# Patient Record
Sex: FEMALE | Race: OTHER | HISPANIC OR LATINO | URBAN - METROPOLITAN AREA
[De-identification: names, ages, dates, MRNs, and addresses within clinical notes are randomized per-mention and may not be internally consistent; named-entity substitution may affect disease eponyms.]

---

## 2018-09-02 ENCOUNTER — INPATIENT (INPATIENT)
Facility: HOSPITAL | Age: 63
LOS: 2 days | Discharge: HOME | DRG: 247 | End: 2018-09-05
Attending: INTERNAL MEDICINE | Admitting: INTERNAL MEDICINE
Payer: MEDICARE

## 2018-09-02 VITALS
TEMPERATURE: 98 F | RESPIRATION RATE: 18 BRPM | DIASTOLIC BLOOD PRESSURE: 93 MMHG | OXYGEN SATURATION: 97 % | SYSTOLIC BLOOD PRESSURE: 125 MMHG | HEART RATE: 55 BPM

## 2018-09-02 DIAGNOSIS — R07.9 CHEST PAIN, UNSPECIFIED: ICD-10-CM

## 2018-09-02 DIAGNOSIS — Z98.891 HISTORY OF UTERINE SCAR FROM PREVIOUS SURGERY: Chronic | ICD-10-CM

## 2018-09-02 DIAGNOSIS — E11.9 TYPE 2 DIABETES MELLITUS WITHOUT COMPLICATIONS: ICD-10-CM

## 2018-09-02 DIAGNOSIS — E66.01 MORBID (SEVERE) OBESITY DUE TO EXCESS CALORIES: ICD-10-CM

## 2018-09-02 DIAGNOSIS — I10 ESSENTIAL (PRIMARY) HYPERTENSION: ICD-10-CM

## 2018-09-02 DIAGNOSIS — G47.33 OBSTRUCTIVE SLEEP APNEA (ADULT) (PEDIATRIC): ICD-10-CM

## 2018-09-02 DIAGNOSIS — K21.9 GASTRO-ESOPHAGEAL REFLUX DISEASE WITHOUT ESOPHAGITIS: ICD-10-CM

## 2018-09-02 LAB
ALBUMIN SERPL ELPH-MCNC: 3.7 G/DL — SIGNIFICANT CHANGE UP (ref 3.3–5)
ALP SERPL-CCNC: 104 U/L — SIGNIFICANT CHANGE UP (ref 40–120)
ALT FLD-CCNC: 28 U/L — SIGNIFICANT CHANGE UP (ref 10–45)
ANION GAP SERPL CALC-SCNC: 13 MMOL/L — SIGNIFICANT CHANGE UP (ref 5–17)
ANION GAP SERPL CALC-SCNC: 17 MMOL/L — SIGNIFICANT CHANGE UP (ref 5–17)
APTT BLD: 30.5 SEC — SIGNIFICANT CHANGE UP (ref 27.5–37.4)
AST SERPL-CCNC: 29 U/L — SIGNIFICANT CHANGE UP (ref 10–40)
BASOPHILS NFR BLD AUTO: 0.3 % — SIGNIFICANT CHANGE UP (ref 0–2)
BILIRUB SERPL-MCNC: 0.3 MG/DL — SIGNIFICANT CHANGE UP (ref 0.2–1.2)
BUN SERPL-MCNC: 17 MG/DL — SIGNIFICANT CHANGE UP (ref 7–23)
BUN SERPL-MCNC: 18 MG/DL — SIGNIFICANT CHANGE UP (ref 7–23)
CALCIUM SERPL-MCNC: 9.4 MG/DL — SIGNIFICANT CHANGE UP (ref 8.4–10.5)
CALCIUM SERPL-MCNC: 9.4 MG/DL — SIGNIFICANT CHANGE UP (ref 8.4–10.5)
CHLORIDE SERPL-SCNC: 101 MMOL/L — SIGNIFICANT CHANGE UP (ref 96–108)
CHLORIDE SERPL-SCNC: 99 MMOL/L — SIGNIFICANT CHANGE UP (ref 96–108)
CHOLEST SERPL-MCNC: 203 MG/DL — HIGH (ref 10–199)
CK MB CFR SERPL CALC: 2.9 NG/ML — SIGNIFICANT CHANGE UP (ref 0–6.7)
CK SERPL-CCNC: 86 U/L — SIGNIFICANT CHANGE UP (ref 25–170)
CO2 SERPL-SCNC: 24 MMOL/L — SIGNIFICANT CHANGE UP (ref 22–31)
CO2 SERPL-SCNC: 25 MMOL/L — SIGNIFICANT CHANGE UP (ref 22–31)
CREAT SERPL-MCNC: 0.84 MG/DL — SIGNIFICANT CHANGE UP (ref 0.5–1.3)
CREAT SERPL-MCNC: 0.94 MG/DL — SIGNIFICANT CHANGE UP (ref 0.5–1.3)
CRP SERPL-MCNC: 1.87 MG/DL — HIGH (ref 0–0.4)
EOSINOPHIL NFR BLD AUTO: 1.6 % — SIGNIFICANT CHANGE UP (ref 0–6)
ERYTHROCYTE [SEDIMENTATION RATE] IN BLOOD: 13 MM/HR — SIGNIFICANT CHANGE UP
GLUCOSE BLDC GLUCOMTR-MCNC: 146 MG/DL — HIGH (ref 70–99)
GLUCOSE BLDC GLUCOMTR-MCNC: 205 MG/DL — HIGH (ref 70–99)
GLUCOSE BLDC GLUCOMTR-MCNC: 238 MG/DL — HIGH (ref 70–99)
GLUCOSE BLDC GLUCOMTR-MCNC: 243 MG/DL — HIGH (ref 70–99)
GLUCOSE BLDC GLUCOMTR-MCNC: 296 MG/DL — HIGH (ref 70–99)
GLUCOSE SERPL-MCNC: 330 MG/DL — HIGH (ref 70–99)
GLUCOSE SERPL-MCNC: 347 MG/DL — HIGH (ref 70–99)
HBA1C BLD-MCNC: 11.1 % — HIGH (ref 4–5.6)
HCT VFR BLD CALC: 40.1 % — SIGNIFICANT CHANGE UP (ref 34.5–45)
HCT VFR BLD CALC: 40.2 % — SIGNIFICANT CHANGE UP (ref 34.5–45)
HDLC SERPL-MCNC: 36 MG/DL — LOW
HGB BLD-MCNC: 13.4 G/DL — SIGNIFICANT CHANGE UP (ref 11.5–15.5)
HGB BLD-MCNC: 13.6 G/DL — SIGNIFICANT CHANGE UP (ref 11.5–15.5)
INR BLD: 1.31 — HIGH (ref 0.88–1.16)
LIDOCAIN IGE QN: 28 U/L — SIGNIFICANT CHANGE UP (ref 7–60)
LIPID PNL WITH DIRECT LDL SERPL: 121 MG/DL — SIGNIFICANT CHANGE UP
LYMPHOCYTES # BLD AUTO: 16.1 % — SIGNIFICANT CHANGE UP (ref 13–44)
MAGNESIUM SERPL-MCNC: 1.6 MG/DL — SIGNIFICANT CHANGE UP (ref 1.6–2.6)
MCHC RBC-ENTMCNC: 30.1 PG — SIGNIFICANT CHANGE UP (ref 27–34)
MCHC RBC-ENTMCNC: 30.5 PG — SIGNIFICANT CHANGE UP (ref 27–34)
MCHC RBC-ENTMCNC: 33.4 G/DL — SIGNIFICANT CHANGE UP (ref 32–36)
MCHC RBC-ENTMCNC: 33.8 G/DL — SIGNIFICANT CHANGE UP (ref 32–36)
MCV RBC AUTO: 88.9 FL — SIGNIFICANT CHANGE UP (ref 80–100)
MCV RBC AUTO: 91.3 FL — SIGNIFICANT CHANGE UP (ref 80–100)
MONOCYTES NFR BLD AUTO: 5.3 % — SIGNIFICANT CHANGE UP (ref 2–14)
NEUTROPHILS NFR BLD AUTO: 76.7 % — SIGNIFICANT CHANGE UP (ref 43–77)
NT-PROBNP SERPL-SCNC: 100 PG/ML — SIGNIFICANT CHANGE UP (ref 0–300)
NT-PROBNP SERPL-SCNC: 135 PG/ML — SIGNIFICANT CHANGE UP (ref 0–300)
PLATELET # BLD AUTO: 195 K/UL — SIGNIFICANT CHANGE UP (ref 150–400)
PLATELET # BLD AUTO: 203 K/UL — SIGNIFICANT CHANGE UP (ref 150–400)
POTASSIUM SERPL-MCNC: 3.4 MMOL/L — LOW (ref 3.5–5.3)
POTASSIUM SERPL-MCNC: SIGNIFICANT CHANGE UP MMOL/L (ref 3.5–5.3)
POTASSIUM SERPL-SCNC: 3.4 MMOL/L — LOW (ref 3.5–5.3)
POTASSIUM SERPL-SCNC: SIGNIFICANT CHANGE UP MMOL/L (ref 3.5–5.3)
PROT SERPL-MCNC: 6.6 G/DL — SIGNIFICANT CHANGE UP (ref 6–8.3)
PROTHROM AB SERPL-ACNC: 14.6 SEC — HIGH (ref 9.8–12.7)
RBC # BLD: 4.39 M/UL — SIGNIFICANT CHANGE UP (ref 3.8–5.2)
RBC # BLD: 4.52 M/UL — SIGNIFICANT CHANGE UP (ref 3.8–5.2)
RBC # FLD: 13.3 % — SIGNIFICANT CHANGE UP (ref 10.3–16.9)
RBC # FLD: 13.3 % — SIGNIFICANT CHANGE UP (ref 10.3–16.9)
SODIUM SERPL-SCNC: 139 MMOL/L — SIGNIFICANT CHANGE UP (ref 135–145)
SODIUM SERPL-SCNC: 140 MMOL/L — SIGNIFICANT CHANGE UP (ref 135–145)
TOTAL CHOLESTEROL/HDL RATIO MEASUREMENT: 5.6 RATIO — SIGNIFICANT CHANGE UP (ref 3.3–7.1)
TRIGL SERPL-MCNC: 229 MG/DL — HIGH (ref 10–149)
TROPONIN T SERPL-MCNC: 0.1 NG/ML — CRITICAL HIGH (ref 0–0.01)
TROPONIN T SERPL-MCNC: 0.18 NG/ML — CRITICAL HIGH (ref 0–0.01)
TROPONIN T SERPL-MCNC: <0.01 NG/ML — SIGNIFICANT CHANGE UP (ref 0–0.01)
TSH SERPL-MCNC: 1.16 UIU/ML — SIGNIFICANT CHANGE UP (ref 0.35–4.94)
WBC # BLD: 6.6 K/UL — SIGNIFICANT CHANGE UP (ref 3.8–10.5)
WBC # BLD: 7.6 K/UL — SIGNIFICANT CHANGE UP (ref 3.8–10.5)
WBC # FLD AUTO: 6.6 K/UL — SIGNIFICANT CHANGE UP (ref 3.8–10.5)
WBC # FLD AUTO: 7.6 K/UL — SIGNIFICANT CHANGE UP (ref 3.8–10.5)

## 2018-09-02 PROCEDURE — 93010 ELECTROCARDIOGRAM REPORT: CPT

## 2018-09-02 PROCEDURE — 71045 X-RAY EXAM CHEST 1 VIEW: CPT | Mod: 26

## 2018-09-02 PROCEDURE — 99223 1ST HOSP IP/OBS HIGH 75: CPT

## 2018-09-02 PROCEDURE — 93010 ELECTROCARDIOGRAM REPORT: CPT | Mod: 76

## 2018-09-02 PROCEDURE — 99285 EMERGENCY DEPT VISIT HI MDM: CPT | Mod: 25

## 2018-09-02 RX ORDER — HEPARIN SODIUM 5000 [USP'U]/ML
INJECTION INTRAVENOUS; SUBCUTANEOUS
Qty: 25000 | Refills: 0 | Status: DISCONTINUED | OUTPATIENT
Start: 2018-09-02 | End: 2018-09-04

## 2018-09-02 RX ORDER — CLOPIDOGREL BISULFATE 75 MG/1
600 TABLET, FILM COATED ORAL ONCE
Qty: 0 | Refills: 0 | Status: COMPLETED | OUTPATIENT
Start: 2018-09-02 | End: 2018-09-02

## 2018-09-02 RX ORDER — MORPHINE SULFATE 50 MG/1
2 CAPSULE, EXTENDED RELEASE ORAL ONCE
Qty: 0 | Refills: 0 | Status: DISCONTINUED | OUTPATIENT
Start: 2018-09-02 | End: 2018-09-02

## 2018-09-02 RX ORDER — HEPARIN SODIUM 5000 [USP'U]/ML
6000 INJECTION INTRAVENOUS; SUBCUTANEOUS EVERY 6 HOURS
Qty: 0 | Refills: 0 | Status: DISCONTINUED | OUTPATIENT
Start: 2018-09-02 | End: 2018-09-04

## 2018-09-02 RX ORDER — LOSARTAN POTASSIUM 100 MG/1
25 TABLET, FILM COATED ORAL DAILY
Qty: 0 | Refills: 0 | Status: DISCONTINUED | OUTPATIENT
Start: 2018-09-02 | End: 2018-09-05

## 2018-09-02 RX ORDER — GLUCAGON INJECTION, SOLUTION 0.5 MG/.1ML
1 INJECTION, SOLUTION SUBCUTANEOUS ONCE
Qty: 0 | Refills: 0 | Status: DISCONTINUED | OUTPATIENT
Start: 2018-09-02 | End: 2018-09-05

## 2018-09-02 RX ORDER — DEXTROSE 50 % IN WATER 50 %
15 SYRINGE (ML) INTRAVENOUS ONCE
Qty: 0 | Refills: 0 | Status: DISCONTINUED | OUTPATIENT
Start: 2018-09-02 | End: 2018-09-05

## 2018-09-02 RX ORDER — PANTOPRAZOLE SODIUM 20 MG/1
40 TABLET, DELAYED RELEASE ORAL
Qty: 0 | Refills: 0 | Status: DISCONTINUED | OUTPATIENT
Start: 2018-09-02 | End: 2018-09-05

## 2018-09-02 RX ORDER — SODIUM CHLORIDE 9 MG/ML
1000 INJECTION, SOLUTION INTRAVENOUS
Qty: 0 | Refills: 0 | Status: DISCONTINUED | OUTPATIENT
Start: 2018-09-02 | End: 2018-09-05

## 2018-09-02 RX ORDER — HEPARIN SODIUM 5000 [USP'U]/ML
5000 INJECTION INTRAVENOUS; SUBCUTANEOUS ONCE
Qty: 0 | Refills: 0 | Status: COMPLETED | OUTPATIENT
Start: 2018-09-02 | End: 2018-09-02

## 2018-09-02 RX ORDER — ACETAMINOPHEN 500 MG
650 TABLET ORAL EVERY 6 HOURS
Qty: 0 | Refills: 0 | Status: DISCONTINUED | OUTPATIENT
Start: 2018-09-02 | End: 2018-09-05

## 2018-09-02 RX ORDER — INSULIN LISPRO 100/ML
VIAL (ML) SUBCUTANEOUS
Qty: 0 | Refills: 0 | Status: DISCONTINUED | OUTPATIENT
Start: 2018-09-02 | End: 2018-09-05

## 2018-09-02 RX ORDER — METOPROLOL TARTRATE 50 MG
12.5 TABLET ORAL
Qty: 0 | Refills: 0 | Status: DISCONTINUED | OUTPATIENT
Start: 2018-09-02 | End: 2018-09-02

## 2018-09-02 RX ORDER — POTASSIUM CHLORIDE 20 MEQ
40 PACKET (EA) ORAL ONCE
Qty: 0 | Refills: 0 | Status: COMPLETED | OUTPATIENT
Start: 2018-09-02 | End: 2018-09-02

## 2018-09-02 RX ORDER — ASPIRIN/CALCIUM CARB/MAGNESIUM 324 MG
162 TABLET ORAL ONCE
Qty: 0 | Refills: 0 | Status: COMPLETED | OUTPATIENT
Start: 2018-09-02 | End: 2018-09-02

## 2018-09-02 RX ORDER — MAGNESIUM SULFATE 500 MG/ML
2 VIAL (ML) INJECTION ONCE
Qty: 0 | Refills: 0 | Status: COMPLETED | OUTPATIENT
Start: 2018-09-02 | End: 2018-09-02

## 2018-09-02 RX ORDER — DEXTROSE 50 % IN WATER 50 %
25 SYRINGE (ML) INTRAVENOUS ONCE
Qty: 0 | Refills: 0 | Status: DISCONTINUED | OUTPATIENT
Start: 2018-09-02 | End: 2018-09-05

## 2018-09-02 RX ORDER — ASPIRIN/CALCIUM CARB/MAGNESIUM 324 MG
81 TABLET ORAL DAILY
Qty: 0 | Refills: 0 | Status: DISCONTINUED | OUTPATIENT
Start: 2018-09-03 | End: 2018-09-05

## 2018-09-02 RX ORDER — INFLUENZA VIRUS VACCINE 15; 15; 15; 15 UG/.5ML; UG/.5ML; UG/.5ML; UG/.5ML
0.5 SUSPENSION INTRAMUSCULAR ONCE
Qty: 0 | Refills: 0 | Status: COMPLETED | OUTPATIENT
Start: 2018-09-02 | End: 2018-09-02

## 2018-09-02 RX ORDER — ASPIRIN/CALCIUM CARB/MAGNESIUM 324 MG
81 TABLET ORAL DAILY
Qty: 0 | Refills: 0 | Status: DISCONTINUED | OUTPATIENT
Start: 2018-09-02 | End: 2018-09-02

## 2018-09-02 RX ORDER — ATORVASTATIN CALCIUM 80 MG/1
40 TABLET, FILM COATED ORAL AT BEDTIME
Qty: 0 | Refills: 0 | Status: DISCONTINUED | OUTPATIENT
Start: 2018-09-02 | End: 2018-09-04

## 2018-09-02 RX ORDER — DEXTROSE 50 % IN WATER 50 %
12.5 SYRINGE (ML) INTRAVENOUS ONCE
Qty: 0 | Refills: 0 | Status: DISCONTINUED | OUTPATIENT
Start: 2018-09-02 | End: 2018-09-05

## 2018-09-02 RX ORDER — ASPIRIN/CALCIUM CARB/MAGNESIUM 324 MG
325 TABLET ORAL ONCE
Qty: 0 | Refills: 0 | Status: DISCONTINUED | OUTPATIENT
Start: 2018-09-02 | End: 2018-09-02

## 2018-09-02 RX ORDER — HEPARIN SODIUM 5000 [USP'U]/ML
7500 INJECTION INTRAVENOUS; SUBCUTANEOUS EVERY 8 HOURS
Qty: 0 | Refills: 0 | Status: DISCONTINUED | OUTPATIENT
Start: 2018-09-02 | End: 2018-09-02

## 2018-09-02 RX ORDER — CLOPIDOGREL BISULFATE 75 MG/1
75 TABLET, FILM COATED ORAL DAILY
Qty: 0 | Refills: 0 | Status: DISCONTINUED | OUTPATIENT
Start: 2018-09-03 | End: 2018-09-05

## 2018-09-02 RX ADMIN — Medication 650 MILLIGRAM(S): at 06:10

## 2018-09-02 RX ADMIN — Medication 4: at 16:37

## 2018-09-02 RX ADMIN — Medication 81 MILLIGRAM(S): at 14:06

## 2018-09-02 RX ADMIN — Medication 650 MILLIGRAM(S): at 05:30

## 2018-09-02 RX ADMIN — Medication 162 MILLIGRAM(S): at 19:27

## 2018-09-02 RX ADMIN — MORPHINE SULFATE 2 MILLIGRAM(S): 50 CAPSULE, EXTENDED RELEASE ORAL at 03:00

## 2018-09-02 RX ADMIN — MORPHINE SULFATE 2 MILLIGRAM(S): 50 CAPSULE, EXTENDED RELEASE ORAL at 03:06

## 2018-09-02 RX ADMIN — HEPARIN SODIUM 5000 UNIT(S): 5000 INJECTION INTRAVENOUS; SUBCUTANEOUS at 18:09

## 2018-09-02 RX ADMIN — Medication 40 MILLIEQUIVALENT(S): at 07:46

## 2018-09-02 RX ADMIN — Medication 4: at 08:05

## 2018-09-02 RX ADMIN — Medication 6: at 11:21

## 2018-09-02 RX ADMIN — MORPHINE SULFATE 2 MILLIGRAM(S): 50 CAPSULE, EXTENDED RELEASE ORAL at 21:51

## 2018-09-02 RX ADMIN — PANTOPRAZOLE SODIUM 40 MILLIGRAM(S): 20 TABLET, DELAYED RELEASE ORAL at 07:47

## 2018-09-02 RX ADMIN — ATORVASTATIN CALCIUM 40 MILLIGRAM(S): 80 TABLET, FILM COATED ORAL at 21:37

## 2018-09-02 RX ADMIN — HEPARIN SODIUM 7500 UNIT(S): 5000 INJECTION INTRAVENOUS; SUBCUTANEOUS at 14:06

## 2018-09-02 RX ADMIN — MORPHINE SULFATE 2 MILLIGRAM(S): 50 CAPSULE, EXTENDED RELEASE ORAL at 03:43

## 2018-09-02 RX ADMIN — HEPARIN SODIUM 7500 UNIT(S): 5000 INJECTION INTRAVENOUS; SUBCUTANEOUS at 07:59

## 2018-09-02 RX ADMIN — MORPHINE SULFATE 2 MILLIGRAM(S): 50 CAPSULE, EXTENDED RELEASE ORAL at 21:36

## 2018-09-02 RX ADMIN — CLOPIDOGREL BISULFATE 600 MILLIGRAM(S): 75 TABLET, FILM COATED ORAL at 19:27

## 2018-09-02 RX ADMIN — MORPHINE SULFATE 2 MILLIGRAM(S): 50 CAPSULE, EXTENDED RELEASE ORAL at 01:01

## 2018-09-02 RX ADMIN — LOSARTAN POTASSIUM 25 MILLIGRAM(S): 100 TABLET, FILM COATED ORAL at 07:54

## 2018-09-02 RX ADMIN — Medication 50 GRAM(S): at 03:08

## 2018-09-02 RX ADMIN — HEPARIN SODIUM 1000 UNIT(S)/HR: 5000 INJECTION INTRAVENOUS; SUBCUTANEOUS at 18:08

## 2018-09-02 NOTE — PROVIDER CONTACT NOTE (CRITICAL VALUE NOTIFICATION) - ACTION/TREATMENT ORDERED:
SESAR Dickens made aware, no treatment at this time. 3rd trop will be drawn. pt denies any cardiac c/o and free of chest pain.

## 2018-09-02 NOTE — PROGRESS NOTE ADULT - PROBLEM SELECTOR PROBLEM 4
Type 2 diabetes mellitus without complication, without long-term current use of insulin NATHAN (obstructive sleep apnea)

## 2018-09-02 NOTE — ED ADULT TRIAGE NOTE - ARRIVAL INFO ADDITIONAL COMMENTS
Pt BIBA c/o chest pain onset 2 hours ago. Pt given 1 nitro SL, ASA 162mg, and Zofran 4 mg IV by EMS. Pt verbalized relief.

## 2018-09-02 NOTE — H&P ADULT - NSHPLABSRESULTS_GEN_ALL_CORE
Troponin T, Serum (09.02.18 @ 00:59)    Troponin T, Serum: <0.01: Reference interval for troponin T is </= 0.01 ng/mL which includes the  99th percentile of a healthy population. Troponin T results are not  interchangeable with troponin I results. ng/mL

## 2018-09-02 NOTE — PROGRESS NOTE ADULT - PROBLEM SELECTOR PLAN 4
Check Hgb A1C CPAP -on CPAP      DVT ppx: SQH  Dispo: pending clinical progression    Case d/w Dr. Preciado

## 2018-09-02 NOTE — ED PROVIDER NOTE - OBJECTIVE STATEMENT
63F hx htn, dm, gerd, c/o midsternal chest pain. pt states she was arguing with her  when developed sharp pain to chest, states radiated to jaw.  felt like she couldn't catch her breath.  +nausea. no vomiting, no dizziness, no LE swelling. no abd pain.  no fevers. no recent travel. no sick contacts. no prior chest pain.  was given aspirin, nitro and zofran by EMS.

## 2018-09-02 NOTE — CONSULT NOTE ADULT - ASSESSMENT
Agree with SOAP,and she has morbid obesity,HTN,Obst.sleep apnea & uncontrolled DM2 with nephropathy.

## 2018-09-02 NOTE — ED ADULT NURSE NOTE - NSIMPLEMENTINTERV_GEN_ALL_ED
Implemented All Universal Safety Interventions:  [place] to call system. Call bell, personal items and telephone within reach. Instruct patient to call for assistance. Room bathroom lighting operational. Non-slip footwear when patient is off stretcher. Physically safe environment: no spills, clutter or unnecessary equipment. Stretcher in lowest position, wheels locked, appropriate side rails in place.

## 2018-09-02 NOTE — ED PROVIDER NOTE - PROGRESS NOTE DETAILS
recurrent pressure, EKG similar. will admit to tele for continuous tele monitoring and possible provacative testing

## 2018-09-02 NOTE — PROGRESS NOTE ADULT - SUBJECTIVE AND OBJECTIVE BOX
Interventional Cardiology PA Adult Progress Note    Subjective Assessment:  	  MEDICATIONS:  losartan 25 milliGRAM(s) Oral daily        acetaminophen   Tablet. 650 milliGRAM(s) Oral every 6 hours PRN    pantoprazole   Suspension 40 milliGRAM(s) Oral before breakfast    dextrose 40% Gel 15 Gram(s) Oral once PRN  dextrose 50% Injectable 12.5 Gram(s) IV Push once  dextrose 50% Injectable 25 Gram(s) IV Push once  dextrose 50% Injectable 25 Gram(s) IV Push once  glucagon  Injectable 1 milliGRAM(s) IntraMuscular once PRN  insulin lispro (HumaLOG) corrective regimen sliding scale   SubCutaneous Before meals and at bedtime    dextrose 5%. 1000 milliLiter(s) IV Continuous <Continuous>  heparin  Injectable 7500 Unit(s) SubCutaneous every 8 hours  influenza   Vaccine 0.5 milliLiter(s) IntraMuscular once      	    [PHYSICAL EXAM:  TELEMETRY:  T(C): 36.9 (09-02-18 @ 05:40), Max: 36.9 (09-02-18 @ 00:23)  HR: 55 (09-02-18 @ 05:40) (55 - 64)  BP: 159/78 (09-02-18 @ 05:40) (125/93 - 160/78)  RR: 16 (09-02-18 @ 06:20) (14 - 18)  SpO2: 98% (09-02-18 @ 06:20) (96% - 98%)  Wt(kg): --  I&O's Summary    Height (cm): 170.18 (09-02 @ 04:30)  Weight (kg): 118.8 (09-02 @ 04:30)  BMI (kg/m2): 41 (09-02 @ 04:30)  BSA (m2): 2.27 (09-02 @ 04:30)  Bower:  Central/PICC/Mid Line:                                         Appearance: Normal	  HEENT:   Normal oral mucosa, PERRL, EOMI	  Neck: Supple, + JVD/ - JVD; Carotid Bruit   Cardiovascular: Normal S1 S2, No JVD, No murmurs,   Respiratory: Lungs clear to auscultation/Decreased Breath Sounds/No Rales, Rhonchi, Wheezing	  Gastrointestinal:  Soft, Non-tender, + BS	  Skin: No rashes, No ecchymoses, No cyanosis  Extremities: Normal range of motion, No clubbing, cyanosis or edema  Vascular: Peripheral pulses palpable 2+ bilaterally  Neurologic: Non-focal  Psychiatry: A & O x 3, Mood & affect appropriate      	    ECG:  	  RADIOLOGY:   DIAGNOSTIC TESTING:  [ ] Echocardiogram:  [ ]  Catheterization:  [ ] Stress Test:    [ ] NASIR  OTHER: 	    LABS:	 	  CARDIAC MARKERS:                                  13.6   7.6   )-----------( 203      ( 02 Sep 2018 01:00 )             40.2     09-02    140  |  99  |  18  ----------------------------<  347<H>  3.4<L>   |  24  |  0.94    Ca    9.4      02 Sep 2018 00:59  Mg     1.6     09-02    TPro  6.6  /  Alb  3.7  /  TBili  0.3  /  DBili  x   /  AST  29  /  ALT  28  /  AlkPhos  104  09-02    proBNP: Serum Pro-Brain Natriuretic Peptide: 100 pg/mL (09-02 @ 00:59)    Lipid Profile:   HgA1c:   TSH:   PT/INR - ( 02 Sep 2018 01:00 )   PT: 14.6 sec;   INR: 1.31          PTT - ( 02 Sep 2018 01:00 )  PTT:30.5 sec    ASSESSMENT/PLAN: 	        DVT ppx:  Dispo: Interventional Cardiology PA Adult Progress Note    CC: "I want to go home"  Subjective Assessment: Pt was examined at bedside this AM, repeatedly asking when she will go home. Denies CP or SOB.  12 point ROS negative except as per subjective  	  MEDICATIONS:  losartan 25 milliGRAM(s) Oral daily  acetaminophen   Tablet. 650 milliGRAM(s) Oral every 6 hours PRN  pantoprazole   Suspension 40 milliGRAM(s) Oral before breakfast  dextrose 40% Gel 15 Gram(s) Oral once PRN  dextrose 50% Injectable 12.5 Gram(s) IV Push once  dextrose 50% Injectable 25 Gram(s) IV Push once  dextrose 50% Injectable 25 Gram(s) IV Push once  glucagon  Injectable 1 milliGRAM(s) IntraMuscular once PRN  insulin lispro (HumaLOG) corrective regimen sliding scale   SubCutaneous Before meals and at bedtime  dextrose 5%. 1000 milliLiter(s) IV Continuous <Continuous>  heparin  Injectable 7500 Unit(s) SubCutaneous every 8 hours  influenza   Vaccine 0.5 milliLiter(s) IntraMuscular once      	    [PHYSICAL EXAM:  TELEMETRY:  T(C): 36.9 (09-02-18 @ 05:40), Max: 36.9 (09-02-18 @ 00:23)  HR: 55 (09-02-18 @ 05:40) (55 - 64)  BP: 159/78 (09-02-18 @ 05:40) (125/93 - 160/78)  RR: 16 (09-02-18 @ 06:20) (14 - 18)  SpO2: 98% (09-02-18 @ 06:20) (96% - 98%)    I&O's Summary    Height (cm): 170.18 (09-02 @ 04:30)  Weight (kg): 118.8 (09-02 @ 04:30)  BMI (kg/m2): 41 (09-02 @ 04:30)  BSA (m2): 2.27 (09-02 @ 04:30)  Bower:  Central/PICC/Mid Line:                                         Appearance: obese female	  HEENT:   Normal oral mucosa, PERRL, EOMI	  Neck: Supple, - JVD; no Carotid Bruit   Cardiovascular: Normal S1 S2, No JVD, No murmurs,   Respiratory: Lungs clear to auscultation, No Rales, Rhonchi, Wheezing	  Gastrointestinal:  Soft, Non-tender, + BS	  Skin: No rashes, No ecchymoses, No cyanosis  Extremities: Normal range of motion, No clubbing, cyanosis or edema  Vascular: Peripheral pulses palpable 2+ bilaterally  Neurologic: Non-focal  Psychiatry: A & O x 3, Mood & affect appropriate      	    	    LABS:	 	  CARDIAC MARKERS ( 02 Sep 2018 10:48 )  x     / 0.10 ng/mL / x     / x     / x      CARDIAC MARKERS ( 02 Sep 2018 00:59 )  x     / <0.01 ng/mL / 86 U/L / x     / 2.9 ng/mL                              13.6   7.6   )-----------( 203      ( 02 Sep 2018 01:00 )             40.2     09-02    140  |  99  |  18  ----------------------------<  347<H>  3.4<L>   |  24  |  0.94    Ca    9.4      02 Sep 2018 00:59  Mg     1.6     09-02    TPro  6.6  /  Alb  3.7  /  TBili  0.3  /  DBili  x   /  AST  29  /  ALT  28  /  AlkPhos  104  09-02    proBNP: Serum Pro-Brain Natriuretic Peptide: 100 pg/mL (09-02 @ 00:59)    PT/INR - ( 02 Sep 2018 01:00 )   PT: 14.6 sec;   INR: 1.31          PTT - ( 02 Sep 2018 01:00 )  PTT:30.5 sec

## 2018-09-02 NOTE — PROGRESS NOTE ADULT - ASSESSMENT
Patient is a 63 year old female with PMHx of sherry on cpap and HTN(not on medications) who presents to [facility] ED with complaints of chest pain. Patient reports having an argument and being very stressed when she started to develop chest pain. Pain is described as a sharp stabbing in the center of her chest with radiation to her left arm.  During episode she also developed SOB. She denies orthopnea, pnd, palps, dizziness, le edema or syncope. Since arrival to [facility] she is pain free. 12 Lead EKG reveals SR with late transition and NSST changes. Troponin is negative x1. She is to be admitted to telemetry to R/O ACS. She currently ambulate with a rolling walker 2/2 left hip pain/OA. She is currently between primary care providers. Patient is a 63 year old female with PMHx of sherry on cpap and HTN(not on medications) who presents to [facility] ED with complaints of chest pain. Patient reports having an argument and being very stressed when she started to develop chest pain. Pain is described as a sharp stabbing in the center of her chest with radiation to her left arm.  During episode she also developed SOB. She denies orthopnea, pnd, palps, dizziness, le edema or syncope. Since arrival to [facility] she is pain free. 12 Lead EKG reveals SR with late transition and NSST changes. Troponin is negative x1. She is to be admitted to telemetry to R/O ACS.

## 2018-09-02 NOTE — PROGRESS NOTE ADULT - PROBLEM SELECTOR PLAN 1
Atpypical pain  Serial trops  Check 2D echo  If no ACS consider outpatient ischemic eval -Currently chest pain free, Trop x1 negative x2 0.10 f/u trop @ 4pm. ECG unremarkable  -ECHO ordered   -started on ASA 81mg QD  -can f/u with Dr. Preciado as an outpatient

## 2018-09-02 NOTE — H&P ADULT - GASTROINTESTINAL DETAILS
nontender/no rebound tenderness/no rigidity/bowel sounds normal/no organomegaly/no guarding/soft/no distention/no masses palpable/no bruit

## 2018-09-02 NOTE — PATIENT PROFILE ADULT. - TEACHING/LEARNING LEARNING PREFERENCES
individual instruction/video/verbal instruction/skill demonstration/written material/group instruction

## 2018-09-02 NOTE — H&P ADULT - HISTORY OF PRESENT ILLNESS
Patient is a 63 year old female with PMHx of sherry on cpap and HTN(not on medications) who presents to [facility] ED with complaints of chest pain. Patient reports having an argument and being very stressed when she started to develop chest pain. Pain is described as a sharp stabbing in the center of her chest with radiation to her left arm.  During episode she also developed SOB. She denies orthopnea, pnd, palps, dizziness, le edema or syncope. Since arrival to [facility] she is pain free. 12 Lead EKG reveals SR with late transition and NSST changes. Troponin is negative x1. She is to be admitted to telemetry to R/O ACS. She currently ambulate with a rolling walker 2/2 left hip pain/OA. She is currently between primary care providers.

## 2018-09-02 NOTE — PROGRESS NOTE ADULT - PROBLEM SELECTOR PROBLEM 3
Gastroesophageal reflux disease without esophagitis Type 2 diabetes mellitus without complication, without long-term current use of insulin

## 2018-09-02 NOTE — ED ADULT NURSE REASSESSMENT NOTE - NS ED NURSE REASSESS COMMENT FT1
nad or change, conversing with family, who remain at bedside, still awaiting t/p arrival, assessment on-going

## 2018-09-02 NOTE — PROGRESS NOTE ADULT - PROBLEM SELECTOR PLAN 3
add protonix Check Hgb A1C -Not on any medications at home  -A1C: 11.1, Endo consulted f/u recs  -FS and MISS

## 2018-09-02 NOTE — H&P ADULT - ASSESSMENT
Patient is a 63 year old female with PMHx of sherry on cpap and HTN(not on medications) who presents to [facility] ED with complaints of chest pain. Patient reports having an argument and being very stressed when she started to develop chest pain. Pain is described as a sharp stabbing in the center of her chest with radiation to her left arm.  During episode she also developed SOB. She denies orthopnea, pnd, palps, dizziness, le edema or syncope. Since arrival to [facility] she is pain free. 12 Lead EKG reveals SR with late transition and NSST changes. Troponin is negative x1. She is to be admitted to telemetry to R/O ACS. She currently ambulate with a rolling walker 2/2 left hip pain/OA. She is currently between primary care providers.

## 2018-09-02 NOTE — CONSULT NOTE ADULT - SUBJECTIVE AND OBJECTIVE BOX
HPI:63 year old  female ,a known case DM2 & HTN & morbid-obesity,presented to [facility] for chest pain after emotional argument. She also has obst.sleep apnea& she has CPAP  Machine at home.Also has H/o renal stone & kidney disease.    Age at Dx:  How dx:  Hx and duration of insulin:  Current Therapy:  Hx of hypoglycemia  Hx of DKA/HHS?    Home FSG:  Fasting  Lunch  Dinner  Bed    Hx of other regimens  Complications:  Outpatient Endo:    PMH & Surgical Hx:CHEST PAIN  No pertinent family history in first degree relatives  Handoff  MEWS Score  GERD (gastroesophageal reflux disease)  HTN (hypertension)  Diabetes  Chest pain, unspecified type  Obesities, morbid  NATHAN (obstructive sleep apnea)  Type 2 diabetes mellitus without complication, without long-term current use of insulin  Gastroesophageal reflux disease without esophagitis  Essential hypertension  Chest pain, unspecified type  H/O  section  CHEST PAIN       FH:  DM:  Thyroid:  Autoimmune:  Other:    SH:  Smoking:    Etoh:   Recreational Drugs:  Social Life:    Current Meds:  acetaminophen   Tablet. 650 milliGRAM(s) Oral every 6 hours PRN  aspirin enteric coated 81 milliGRAM(s) Oral daily  dextrose 40% Gel 15 Gram(s) Oral once PRN  dextrose 5%. 1000 milliLiter(s) IV Continuous <Continuous>  dextrose 50% Injectable 12.5 Gram(s) IV Push once  dextrose 50% Injectable 25 Gram(s) IV Push once  dextrose 50% Injectable 25 Gram(s) IV Push once  glucagon  Injectable 1 milliGRAM(s) IntraMuscular once PRN  heparin  Injectable 7500 Unit(s) SubCutaneous every 8 hours  influenza   Vaccine 0.5 milliLiter(s) IntraMuscular once  insulin lispro (HumaLOG) corrective regimen sliding scale   SubCutaneous Before meals and at bedtime  losartan 25 milliGRAM(s) Oral daily  pantoprazole   Suspension 40 milliGRAM(s) Oral before breakfast      Allergies:  No Known Allergies      ROS:  Denies the following except as indicated.    General: weight loss/weight gain, decreased appetite, fatigue  Eyes: Blurry vision, double vision, visual changes  ENT: Throat pain, changes in voice,   CV: palpitations, SOB, CP, cough  GI: NVD, difficulty swallowing, abdominal pain  : polyuria, dysuria  Endo: abnormal menses, temperature intolerance, decreased libido  MSK: weakness, joint pain  Skin: rash, dryness, diaphoresis  Heme: Easy bruising,bleeding  Neuro: HA, dizziness, lightheadedness, numbness tingling  Psych: Anxiety, Depression          Height (cm): 170.18 ( @ 04:30)  Weight (kg): 118.8 ( @ 04:30)  BMI (kg/m2): 41 ( @ 04:30)    Vital Signs Last 24 Hrs  T(C): 36.2 (02 Sep 2018 14:22), Max: 36.9 (02 Sep 2018 00:23)  T(F): 97.2 (02 Sep 2018 14:22), Max: 98.4 (02 Sep 2018 00:23)  HR: 59 (02 Sep 2018 14:06) (55 - 64)  BP: 153/80 (02 Sep 2018 14:06) (125/93 - 160/78)  BP(mean): 105 (02 Sep 2018 05:40) (105 - 105)  RR: 18 (02 Sep 2018 14:06) (14 - 18)  SpO2: 96% (02 Sep 2018 08:21) (96% - 98%)  Constitutional: wn/wd in NAD.   HEENT: NCAT, MMM, OP clear, EOMI, , no proptosis or lid retraction  Neck: no thyromegaly or palpable thyroid nodules   Respiratory: lungs CTAB.  Cardiovascular: regular rhythm, normal S1 and S2, no audible murmurs, no peripheral edema  GI: soft, NT/ND, no masses/HSM appreciated.  Neurology: no tremors, DTR 2+  Skin: no visible rashes/lesions  Psychiatric: AAO x 3, normal affect/mood.  Ext: radial pulses intact, DP pulses intact, extremities warm, no cyanosis, clubbing or edema.       LABS:                        13.4   6.6   )-----------( 195      ( 02 Sep 2018 10:48 )             40.1         139  |  101  |  17  ----------------------------<  330<H>  See Note   |  25  |  0.84    Ca    9.4      02 Sep 2018 10:48  Mg     1.6         TPro  6.6  /  Alb  3.7  /  TBili  0.3  /  DBili  x   /  AST  29  /  ALT  28  /  AlkPhos  104      PT/INR - ( 02 Sep 2018 01:00 )   PT: 14.6 sec;   INR: 1.31          PTT - ( 02 Sep 2018 01:00 )  PTT:30.5 sec    Hemoglobin A1C, Whole Blood: 11.1 ( @ 10:48)    Thyroid Stimulating Hormone, Serum: 1.162 ( @ 10:48)      RADIOLOGY & ADDITIONAL STUDIES:    A/P:63y Female    1.  DM  Please continue lantus  10     units at night.  Please continue lispro      units before each meal.  Please continue lispro moderate / low dose sliding scale four times daily with meals and at bedtime    Pt's fasting glucose and pre-meal goal is 100-180mg/dl    Will continue to monitor     For discharge, pt can continue HPI:63 year old  female ,a known case DM2 & HTN & morbid-obesity,presented to [facility] for chest pain after emotional argument. She also has obst.sleep apnea& she has CPAP  Machine at home.Also has H/o renal stone & kidney disease and also has hyperlipidemia.    Age at Dx:  How dx:  Hx and duration of insulin:  Current Therapy:  Hx of hypoglycemia  Hx of DKA/HHS?    Home FSG:  Fasting  Lunch  Dinner  Bed    Hx of other regimens  Complications:  Outpatient Endo:    PMH & Surgical Hx:CHEST PAIN  No pertinent family history in first degree relatives  Handoff  MEWS Score  GERD (gastroesophageal reflux disease)  HTN (hypertension)  Diabetes  Chest pain, unspecified type  Obesities, morbid  NATHAN (obstructive sleep apnea)  Type 2 diabetes mellitus without complication, without long-term current use of insulin  Gastroesophageal reflux disease without esophagitis  Essential hypertension  Chest pain, unspecified type  H/O  section  CHEST PAIN       FH:  DM:  Thyroid:  Autoimmune:  Other:    SH:  Smoking:    Etoh:   Recreational Drugs:  Social Life:    Current Meds:  acetaminophen   Tablet. 650 milliGRAM(s) Oral every 6 hours PRN  aspirin enteric coated 81 milliGRAM(s) Oral daily  dextrose 40% Gel 15 Gram(s) Oral once PRN  dextrose 5%. 1000 milliLiter(s) IV Continuous <Continuous>  dextrose 50% Injectable 12.5 Gram(s) IV Push once  dextrose 50% Injectable 25 Gram(s) IV Push once  dextrose 50% Injectable 25 Gram(s) IV Push once  glucagon  Injectable 1 milliGRAM(s) IntraMuscular once PRN  heparin  Injectable 7500 Unit(s) SubCutaneous every 8 hours  influenza   Vaccine 0.5 milliLiter(s) IntraMuscular once  insulin lispro (HumaLOG) corrective regimen sliding scale   SubCutaneous Before meals and at bedtime  losartan 25 milliGRAM(s) Oral daily  pantoprazole   Suspension 40 milliGRAM(s) Oral before breakfast      Allergies:  No Known Allergies      ROS:  Denies the following except as indicated.    General: weight loss/weight gain, decreased appetite, fatigue  Eyes: Blurry vision, double vision, visual changes  ENT: Throat pain, changes in voice,   CV: palpitations, SOB, CP, cough  GI: NVD, difficulty swallowing, abdominal pain  : polyuria, dysuria  Endo: abnormal menses, temperature intolerance, decreased libido  MSK: weakness, joint pain  Skin: rash, dryness, diaphoresis  Heme: Easy bruising,bleeding  Neuro: HA, dizziness, lightheadedness, numbness tingling  Psych: Anxiety, Depression          Height (cm): 170.18 ( @ 04:30)  Weight (kg): 118.8 ( @ 04:30)  BMI (kg/m2): 41 ( @ 04:30)    Vital Signs Last 24 Hrs  T(C): 36.2 (02 Sep 2018 14:22), Max: 36.9 (02 Sep 2018 00:23)  T(F): 97.2 (02 Sep 2018 14:22), Max: 98.4 (02 Sep 2018 00:23)  HR: 59 (02 Sep 2018 14:06) (55 - 64)  BP: 153/80 (02 Sep 2018 14:06) (125/93 - 160/78)  BP(mean): 105 (02 Sep 2018 05:40) (105 - 105)  RR: 18 (02 Sep 2018 14:06) (14 - 18)  SpO2: 96% (02 Sep 2018 08:21) (96% - 98%)  Constitutional: wn/wd in NAD.   HEENT: NCAT, MMM, OP clear, EOMI, , no proptosis or lid retraction  Neck: no thyromegaly or palpable thyroid nodules   Respiratory: lungs CTAB.  Cardiovascular: regular rhythm, normal S1 and S2, no audible murmurs, no peripheral edema  GI: soft, NT/ND, no masses/HSM appreciated.  Neurology: no tremors, DTR 2+  Skin: no visible rashes/lesions  Psychiatric: AAO x 3, normal affect/mood.  Ext: radial pulses intact, DP pulses intact, extremities warm, no cyanosis, clubbing or edema.       LABS:                        13.4   6.6   )-----------( 195      ( 02 Sep 2018 10:48 )             40.1         139  |  101  |  17  ----------------------------<  330<H>  See Note   |  25  |  0.84    Ca    9.4      02 Sep 2018 10:48  Mg     1.6         TPro  6.6  /  Alb  3.7  /  TBili  0.3  /  DBili  x   /  AST  29  /  ALT  28  /  AlkPhos  104      PT/INR - ( 02 Sep 2018 01:00 )   PT: 14.6 sec;   INR: 1.31          PTT - ( 02 Sep 2018 01:00 )  PTT:30.5 sec    Hemoglobin A1C, Whole Blood: 11.1 ( @ 10:48)    Thyroid Stimulating Hormone, Serum: 1.162 ( @ 10:48)      RADIOLOGY & ADDITIONAL STUDIES:    A/P:63y Female    1.  DM  Please continue lantus  10     units at night.  Please continue lispro      units before each meal.  Please continue lispro moderate / low dose sliding scale four times daily with meals and at bedtime    Pt's fasting glucose and pre-meal goal is 100-180mg/dl    Will continue to monitor     For discharge, pt can continue

## 2018-09-02 NOTE — ED ADULT NURSE REASSESSMENT NOTE - NS ED NURSE REASSESS COMMENT FT1
Daughter at bedside, updated on poc, awaiting Dr. Rai to discuss plan and findings, understanding verbalized

## 2018-09-03 DIAGNOSIS — I21.4 NON-ST ELEVATION (NSTEMI) MYOCARDIAL INFARCTION: ICD-10-CM

## 2018-09-03 LAB
ANION GAP SERPL CALC-SCNC: 10 MMOL/L — SIGNIFICANT CHANGE UP (ref 5–17)
APTT BLD: 59.5 SEC — HIGH (ref 27.5–37.4)
APTT BLD: 71 SEC — HIGH (ref 27.5–37.4)
BUN SERPL-MCNC: 16 MG/DL — SIGNIFICANT CHANGE UP (ref 7–23)
CALCIUM SERPL-MCNC: 9.1 MG/DL — SIGNIFICANT CHANGE UP (ref 8.4–10.5)
CHLORIDE SERPL-SCNC: 100 MMOL/L — SIGNIFICANT CHANGE UP (ref 96–108)
CO2 SERPL-SCNC: 26 MMOL/L — SIGNIFICANT CHANGE UP (ref 22–31)
CREAT SERPL-MCNC: 0.91 MG/DL — SIGNIFICANT CHANGE UP (ref 0.5–1.3)
GLUCOSE BLDC GLUCOMTR-MCNC: 158 MG/DL — HIGH (ref 70–99)
GLUCOSE BLDC GLUCOMTR-MCNC: 172 MG/DL — HIGH (ref 70–99)
GLUCOSE BLDC GLUCOMTR-MCNC: 198 MG/DL — HIGH (ref 70–99)
GLUCOSE BLDC GLUCOMTR-MCNC: 229 MG/DL — HIGH (ref 70–99)
GLUCOSE SERPL-MCNC: 176 MG/DL — HIGH (ref 70–99)
HCT VFR BLD CALC: 39.1 % — SIGNIFICANT CHANGE UP (ref 34.5–45)
HGB BLD-MCNC: 12.7 G/DL — SIGNIFICANT CHANGE UP (ref 11.5–15.5)
MAGNESIUM SERPL-MCNC: 1.8 MG/DL — SIGNIFICANT CHANGE UP (ref 1.6–2.6)
MCHC RBC-ENTMCNC: 29.7 PG — SIGNIFICANT CHANGE UP (ref 27–34)
MCHC RBC-ENTMCNC: 32.5 G/DL — SIGNIFICANT CHANGE UP (ref 32–36)
MCV RBC AUTO: 91.6 FL — SIGNIFICANT CHANGE UP (ref 80–100)
PLATELET # BLD AUTO: 177 K/UL — SIGNIFICANT CHANGE UP (ref 150–400)
POTASSIUM SERPL-MCNC: 3.9 MMOL/L — SIGNIFICANT CHANGE UP (ref 3.5–5.3)
POTASSIUM SERPL-SCNC: 3.9 MMOL/L — SIGNIFICANT CHANGE UP (ref 3.5–5.3)
RBC # BLD: 4.27 M/UL — SIGNIFICANT CHANGE UP (ref 3.8–5.2)
RBC # FLD: 13.7 % — SIGNIFICANT CHANGE UP (ref 10.3–16.9)
SODIUM SERPL-SCNC: 136 MMOL/L — SIGNIFICANT CHANGE UP (ref 135–145)
TROPONIN T SERPL-MCNC: 0.22 NG/ML — CRITICAL HIGH (ref 0–0.01)
WBC # BLD: 6.1 K/UL — SIGNIFICANT CHANGE UP (ref 3.8–10.5)
WBC # FLD AUTO: 6.1 K/UL — SIGNIFICANT CHANGE UP (ref 3.8–10.5)

## 2018-09-03 PROCEDURE — 93306 TTE W/DOPPLER COMPLETE: CPT | Mod: 26

## 2018-09-03 PROCEDURE — 99233 SBSQ HOSP IP/OBS HIGH 50: CPT

## 2018-09-03 RX ORDER — INSULIN GLARGINE 100 [IU]/ML
10 INJECTION, SOLUTION SUBCUTANEOUS AT BEDTIME
Qty: 0 | Refills: 0 | Status: DISCONTINUED | OUTPATIENT
Start: 2018-09-03 | End: 2018-09-05

## 2018-09-03 RX ORDER — SODIUM CHLORIDE 9 MG/ML
1000 INJECTION INTRAMUSCULAR; INTRAVENOUS; SUBCUTANEOUS
Qty: 0 | Refills: 0 | Status: DISCONTINUED | OUTPATIENT
Start: 2018-09-04 | End: 2018-09-04

## 2018-09-03 RX ADMIN — Medication 2: at 22:13

## 2018-09-03 RX ADMIN — HEPARIN SODIUM 1000 UNIT(S)/HR: 5000 INJECTION INTRAVENOUS; SUBCUTANEOUS at 07:10

## 2018-09-03 RX ADMIN — ATORVASTATIN CALCIUM 40 MILLIGRAM(S): 80 TABLET, FILM COATED ORAL at 22:13

## 2018-09-03 RX ADMIN — Medication 4: at 11:50

## 2018-09-03 RX ADMIN — LOSARTAN POTASSIUM 25 MILLIGRAM(S): 100 TABLET, FILM COATED ORAL at 07:02

## 2018-09-03 RX ADMIN — INSULIN GLARGINE 10 UNIT(S): 100 INJECTION, SOLUTION SUBCUTANEOUS at 22:13

## 2018-09-03 RX ADMIN — Medication 2: at 07:03

## 2018-09-03 RX ADMIN — HEPARIN SODIUM 1000 UNIT(S)/HR: 5000 INJECTION INTRAVENOUS; SUBCUTANEOUS at 00:44

## 2018-09-03 RX ADMIN — Medication 2: at 16:49

## 2018-09-03 RX ADMIN — Medication 81 MILLIGRAM(S): at 10:31

## 2018-09-03 RX ADMIN — PANTOPRAZOLE SODIUM 40 MILLIGRAM(S): 20 TABLET, DELAYED RELEASE ORAL at 07:02

## 2018-09-03 RX ADMIN — CLOPIDOGREL BISULFATE 75 MILLIGRAM(S): 75 TABLET, FILM COATED ORAL at 10:31

## 2018-09-03 NOTE — PROGRESS NOTE ADULT - PROBLEM SELECTOR PLAN 3
-Not on any medications at home  -A1C: 11.1, Endo consulted   -Per endocrine add Lantus 10 units at bedtime   -FS and MISS -Not on any medications at home  -A1C: 11.1, Endo consulted   -Per endocrine add Lantus 10 units at bedtime   -FS/ISS

## 2018-09-03 NOTE — PROGRESS NOTE ADULT - PROBLEM SELECTOR PLAN 2
-160's  -Continue Losartan 25mg QD  -can add on norvasc 5 mg if BP uptrends -140's Today   -Continue Losartan 25mg QD  -can add on norvasc 5 mg if BP uptrends

## 2018-09-03 NOTE — PROGRESS NOTE ADULT - SUBJECTIVE AND OBJECTIVE BOX
INTERVAL HPI/OVERNIGHT EVENTS:    Patient is a 63y old  Female who presents with a chief complaint of Chest pain (02 Sep 2018 05:40),she is awake ,alert & oriented x3 & doing fine & is scheduled for cronary anngiogram in AM,our endocrine teamwill follow her from tomorrow.      Pt reports the following symptoms:    CONSTITUTIONAL:  Negative fever or chills, feels well, good appetite  EYES:  Negative  blurry vision or double vision  CARDIOVASCULAR:  Negative for chest pain or palpitations  RESPIRATORY:  Negative for cough, wheezing, or SOB   GASTROINTESTINAL:  Negative for nausea, vomiting, diarrhea, constipation, or abdominal pain  GENITOURINARY:  Negative frequency, urgency or dysuria  NEUROLOGIC:  No headache, confusion, dizziness, lightheadedness    MEDICATIONS  (STANDING):  aspirin enteric coated 81 milliGRAM(s) Oral daily  atorvastatin 40 milliGRAM(s) Oral at bedtime  clopidogrel Tablet 75 milliGRAM(s) Oral daily  dextrose 5%. 1000 milliLiter(s) (50 mL/Hr) IV Continuous <Continuous>  dextrose 50% Injectable 12.5 Gram(s) IV Push once  dextrose 50% Injectable 25 Gram(s) IV Push once  dextrose 50% Injectable 25 Gram(s) IV Push once  heparin  Infusion.  Unit(s)/Hr (10 mL/Hr) IV Continuous <Continuous>  influenza   Vaccine 0.5 milliLiter(s) IntraMuscular once  insulin glargine Injectable (LANTUS) 10 Unit(s) SubCutaneous at bedtime  insulin lispro (HumaLOG) corrective regimen sliding scale   SubCutaneous Before meals and at bedtime  losartan 25 milliGRAM(s) Oral daily  pantoprazole   Suspension 40 milliGRAM(s) Oral before breakfast    MEDICATIONS  (PRN):  acetaminophen   Tablet. 650 milliGRAM(s) Oral every 6 hours PRN Mild Pain (1 - 3)  dextrose 40% Gel 15 Gram(s) Oral once PRN Blood Glucose LESS THAN 70 milliGRAM(s)/deciliter  glucagon  Injectable 1 milliGRAM(s) IntraMuscular once PRN Glucose LESS THAN 70 milligrams/deciliter  heparin  Injectable 6000 Unit(s) IV Push every 6 hours PRN For aPTT less than 40      PHYSICAL EXAM  Vital Signs Last 24 Hrs  T(C): 36.4 (03 Sep 2018 14:29), Max: 36.9 (02 Sep 2018 22:10)  T(F): 97.5 (03 Sep 2018 14:29), Max: 98.5 (02 Sep 2018 22:10)  HR: 50 (03 Sep 2018 13:07) (42 - 58)  BP: 147/77 (03 Sep 2018 13:07) (125/74 - 169/78)  BP(mean): --  RR: 18 (03 Sep 2018 13:07) (18 - 18)  SpO2: 96% (03 Sep 2018 08:30) (96% - 98%)    Constitutional: wn/wd in NAD.   HEENT: NCAT, MMM, OP clear, EOMI, , no proptosis or lid retraction  Neck: no thyromegaly or palpable thyroid nodules   Respiratory: lungs CTAB.  Cardiovascular: regular rhythm, normal S1 and S2, no audible murmurs, no peripheral edema  GI: soft, NT/ND, no masses/HSM appreciated.  Neurology: no tremors, DTR 2+  Skin: no visible rashes/lesions  Psychiatric: AAO x 3, normal affect/mood.    LABS:                        12.7   6.1   )-----------( 177      ( 03 Sep 2018 06:33 )             39.1     09-03    136  |  100  |  16  ----------------------------<  176<H>  3.9   |  26  |  0.91    Ca    9.1      03 Sep 2018 06:33  Mg     1.8     09-03    TPro  6.6  /  Alb  3.7  /  TBili  0.3  /  DBili  x   /  AST  29  /  ALT  28  /  AlkPhos  104  09-02    PT/INR - ( 02 Sep 2018 01:00 )   PT: 14.6 sec;   INR: 1.31          PTT - ( 03 Sep 2018 06:35 )  PTT:71.0 sec    Thyroid Stimulating Hormone, Serum: 1.162 uIU/mL (09-02 @ 10:48)      HbA1C: 11.1 % (09-02 @ 10:48)          Insulin Sliding Scale requirements X 24 Hours:      RADIOLOGY & ADDITIONAL TESTS:      A/P: 63y Female with history of DM type II presenting for       1.  DM -     Please continue   10        units & lispro moderate / low dose sliding scale 4 times daily with meals and at bedtime.  Please continue consistent carbohydrate diet.      Goal FSG is 100-180mg/dl  Will continue to monitor   For discharge, pt can continue

## 2018-09-03 NOTE — PROGRESS NOTE ADULT - ASSESSMENT
Agree with SOAP & with plan of therapy & discussed with PA & our endo team gaudencio follow her with you.

## 2018-09-03 NOTE — PROGRESS NOTE ADULT - PROBLEM SELECTOR PLAN 4
-on CPAP      DVT ppx: Heparin gtt   Dispo: NPO after midnight for cath tomorrow w/ Dr. Alexandre Oviedo d/w Dr. Preciado

## 2018-09-03 NOTE — PROGRESS NOTE ADULT - SUBJECTIVE AND OBJECTIVE BOX
Interventional Cardiology PA Adult Progress Note    CC: f/u chest pain R/I NSTEMI   Subjective Assessment: Pt seen and examined at bedside this AM. Pt without any complaints at this time. Denies CP or SOB    12 Point review of systems otherwise negative except for subjective HPI    	  MEDICATIONS:  losartan 25 milliGRAM(s) Oral daily        acetaminophen   Tablet. 650 milliGRAM(s) Oral every 6 hours PRN    pantoprazole   Suspension 40 milliGRAM(s) Oral before breakfast    atorvastatin 40 milliGRAM(s) Oral at bedtime  dextrose 40% Gel 15 Gram(s) Oral once PRN  dextrose 50% Injectable 12.5 Gram(s) IV Push once  dextrose 50% Injectable 25 Gram(s) IV Push once  dextrose 50% Injectable 25 Gram(s) IV Push once  glucagon  Injectable 1 milliGRAM(s) IntraMuscular once PRN  insulin lispro (HumaLOG) corrective regimen sliding scale   SubCutaneous Before meals and at bedtime    aspirin enteric coated 81 milliGRAM(s) Oral daily  clopidogrel Tablet 75 milliGRAM(s) Oral daily  dextrose 5%. 1000 milliLiter(s) IV Continuous <Continuous>  heparin  Infusion.  Unit(s)/Hr IV Continuous <Continuous>  heparin  Injectable 6000 Unit(s) IV Push every 6 hours PRN  influenza   Vaccine 0.5 milliLiter(s) IntraMuscular once      	    [PHYSICAL EXAM:  TELEMETRY:  T(C): 36 (09-03-18 @ 10:39), Max: 36.9 (09-02-18 @ 22:10)  HR: 57 (09-03-18 @ 08:30) (42 - 59)  BP: 125/74 (09-03-18 @ 08:30) (125/74 - 169/78)  RR: 18 (09-03-18 @ 08:30) (18 - 18)  SpO2: 96% (09-03-18 @ 08:30) (96% - 98%)  Wt(kg): --  I&O's Summary    02 Sep 2018 07:01  -  03 Sep 2018 07:00  --------------------------------------------------------  IN: 300 mL / OUT: 0 mL / NET: 300 mL    03 Sep 2018 07:01  -  03 Sep 2018 11:25  --------------------------------------------------------  IN: 120 mL / OUT: 0 mL / NET: 120 mL        Bower:  Central/PICC/Mid Line:                                         Appearance: obese 	  HEENT:   Normal oral mucosa, PERRL, EOMI	  Neck: Supple, - JVD  Cardiovascular: Normal S1 S2, No JVD, No murmurs  Respiratory: Lungs clear to auscultation, No Rales, Rhonchi, Wheezing	  Gastrointestinal:  Soft, Non-tender, + BS	  Skin: No rashes, No ecchymoses, No cyanosis  Extremities: Normal range of motion, No clubbing, cyanosis or edema  Vascular: Peripheral pulses palpable 2+ bilaterally  Neurologic: Non-focal  Psychiatry: A & O x 3, Mood & affect appropriate      	    ECG:  	  RADIOLOGY:   DIAGNOSTIC TESTING:  [ ] Echocardiogram:  [ ]  Catheterization:  [ ] Stress Test:    [ ] NASIR  OTHER: 	    LABS:	 	  CARDIAC MARKERS:                                  12.7   6.1   )-----------( 177      ( 03 Sep 2018 06:33 )             39.1     09-03    136  |  100  |  16  ----------------------------<  176<H>  3.9   |  26  |  0.91    Ca    9.1      03 Sep 2018 06:33  Mg     1.8     09-03    TPro  6.6  /  Alb  3.7  /  TBili  0.3  /  DBili  x   /  AST  29  /  ALT  28  /  AlkPhos  104  09-02    proBNP:   Lipid Profile:   HgA1c:   TSH:   PT/INR - ( 02 Sep 2018 01:00 )   PT: 14.6 sec;   INR: 1.31          PTT - ( 03 Sep 2018 06:35 )  PTT:71.0 sec    ASSESSMENT/PLAN: 	        DVT ppx:  Dispo:

## 2018-09-03 NOTE — PROGRESS NOTE ADULT - PROBLEM SELECTOR PLAN 1
-Currently chest pain free, Trop x1 negative, trop up trended--> 0.10-->0.18   - ECG unremarkable  - ECHO ordered   - loaded with aspirin 325 x1 and plavix 600 mg x 1 yesterday, continue with aspirin 81 mg and plavix 75 mg.  -heparin gtt started yesterday   -Plan for cath on Tuesday with Dr. Schroeder  -precath consented, consent in chart  -needs to be added to schedule   -NPO after midnight for cath tomorrow  -holding BB at this time 2/2 bradycardia R/I NSTEMI   -Currently chest pain free, Trop x1 negative, trop up trended--> 0.10-->0.18   - ECG non ischemic   - ECHO ordered, f/u results    - loaded with aspirin 325 x1 and plavix 600 mg x 1 yesterday, continue with aspirin 81 mg and plavix 75 mg.  -heparin gtt started yesterday 9/2/18  -Plan for cath on Tuesday with Dr. Schroeder  -precath consented, consent in chart  -needs to be added to schedule   -NPO after midnight for cath tomorrow  -holding BB at this time 2/2 bradycardia R/I NSTEMI   -Currently chest pain free, Trop x1 negative, trop up trended--> 0.10-->0.18-->0.22   - ECG non ischemic   - ECHO ordered, f/u results    - loaded with aspirin 325 x1 and plavix 600 mg x 1 yesterday, continue with aspirin 81 mg and plavix 75 mg.  -heparin gtt started yesterday 9/2/18  -Plan for cath on Tuesday with Dr. Schroeder  -precath consented, consent in chart  -needs to be added to schedule   -NPO after midnight for cath tomorrow  -holding BB at this time 2/2 bradycardia R/I NSTEMI   -Currently chest pain free, Trop x1 negative, trop up trended--> 0.10-->0.18-->0.22   - ECG non ischemic   - ECHO 9/3/18: LV wall motion normal, EF 65%, no pericardial effusion   - loaded with aspirin 325 x1 and plavix 600 mg x 1 yesterday, continue with aspirin 81 mg and plavix 75 mg.  -heparin gtt started yesterday 9/2/18  -Plan for cath on Tuesday with Dr. Schroeder  -precath consented, consent in chart  -needs to be added to schedule   -NPO after midnight for cath tomorrow  -holding BB at this time 2/2 bradycardia

## 2018-09-03 NOTE — PROGRESS NOTE ADULT - ASSESSMENT
Patient is a 63 year old female with PMHx of sherry on cpap and HTN(not on medications) who presents to [facility] ED with complaints of chest pain. Patient reports having an argument and being very stressed when she started to develop chest pain. Pain is described as a sharp stabbing in the center of her chest with radiation to her left arm.  During episode she also developed SOB. She denies orthopnea, pnd, palps, dizziness, le edema or syncope. Since arrival to [facility] she is pain free. 12 Lead EKG reveals SR with late transition and NSST changes. Troponin is negative x1. She is to be admitted to telemetry to R/O ACS. Patient is a 63 year old female current smoker with PMHx of sherry on cpap and HTN(not on medications) who presents to [facility] ED with complaints of chest pain. Patient reports having an argument and being very stressed when she started to develop chest pain. Pain is described as a sharp stabbing in the center of her chest with radiation to her left arm.  During episode she also developed SOB. She denies orthopnea, pnd, palps, dizziness, le edema or syncope. Since arrival to [facility] she is pain free. 12 Lead EKG reveals SR with late transition and NSST changes. Troponin trended up with peak 0.18 and R/I NSTEMI. Aspirin 325mg x1 and plavix 600 mg x1  loaded, heparin gtt started 9/2/18. Pt is for cardiac catheterization Tuesday 9/4/18 w/ Dr. Schroeder. Patient is a 63 year old female current smoker with PMHx of sherry on cpap and HTN(not on medications) who presents to [facility] ED with complaints of chest pain. Patient reports having an argument and being very stressed when she started to develop chest pain. Pain is described as a sharp stabbing in the center of her chest with radiation to her left arm.  During episode she also developed SOB. She denies orthopnea, pnd, palps, dizziness, le edema or syncope. Since arrival to [facility] she is pain free. 12 Lead EKG reveals SR with late transition and NSST changes. Troponin trended up with peak 0.22 and R/I NSTEMI. Aspirin 325mg x1 and plavix 600 mg x1  loaded, heparin gtt started 9/2/18. Pt is for cardiac catheterization Tuesday 9/4/18 w/ Dr. Schroeder. Patient is a 63 year old female current smoker with PMHx of sherry on cpap and HTN(not on medications) who presents to [facility] ED with complaints of chest pain. Patient reports having an argument and being very stressed when she started to develop chest pain. Pain is described as a sharp stabbing in the center of her chest with radiation to her left arm.  During episode she also developed SOB. She denies orthopnea, pnd, palps, dizziness, le edema or syncope. Since arrival to [facility] she is pain free. 12 Lead EKG reveals SR with late transition and NSST changes. Troponin trended up with peak 0.22 and R/I NSTEMI. Aspirin 325mg x1 and plavix 600 mg x1  loaded, heparin gtt started 9/2/18. Pt is for cardiac catheterization Tuesday 9/4/18 w/ Dr. Schroeder.       Risks & benefits of procedure and alternative therapy have been explained to the patient including but not limited to: allergic reaction, bleeding w/possible need for blood transfusion, infection, renal and vascular compromise, limb damage, arrhythmia, stroke, vessel dissection/perforation, Myocardial infarction, emergent CABG. Informed consent obtained and in chart. Patient is a 63 year old female current smoker with PMHx of sherry on cpap and HTN(not on medications) who presents to [facility] ED with complaints of chest pain. Patient reports having an argument and being very stressed when she started to develop chest pain. Pain is described as a sharp stabbing in the center of her chest with radiation to her left arm.  During episode she also developed SOB. She denies orthopnea, pnd, palps, dizziness, le edema or syncope. CXR without infiltrates. Since arrival to [facility] she is pain free. 12 Lead EKG reveals SR with late transition and NSST changes. Troponin trended up with peak 0.22 and R/I NSTEMI. Aspirin 325mg x1 and plavix 600 mg x1  loaded, heparin gtt started 9/2/18. Pt is NPO after midnight for cardiac catheterization Tuesday 9/4/18 w/ Dr. Schroeder.       Risks & benefits of procedure and alternative therapy have been explained to the patient including but not limited to: allergic reaction, bleeding w/possible need for blood transfusion, infection, renal and vascular compromise, limb damage, arrhythmia, stroke, vessel dissection/perforation, Myocardial infarction, emergent CABG. Informed consent obtained and in chart.

## 2018-09-04 ENCOUNTER — TRANSCRIPTION ENCOUNTER (OUTPATIENT)
Age: 63
End: 2018-09-04

## 2018-09-04 LAB
ANION GAP SERPL CALC-SCNC: 10 MMOL/L — SIGNIFICANT CHANGE UP (ref 5–17)
APTT BLD: 89.2 SEC — HIGH (ref 27.5–37.4)
BUN SERPL-MCNC: 13 MG/DL — SIGNIFICANT CHANGE UP (ref 7–23)
CALCIUM SERPL-MCNC: 9.4 MG/DL — SIGNIFICANT CHANGE UP (ref 8.4–10.5)
CHLORIDE SERPL-SCNC: 102 MMOL/L — SIGNIFICANT CHANGE UP (ref 96–108)
CO2 SERPL-SCNC: 29 MMOL/L — SIGNIFICANT CHANGE UP (ref 22–31)
CREAT SERPL-MCNC: 1.01 MG/DL — SIGNIFICANT CHANGE UP (ref 0.5–1.3)
GLUCOSE BLDC GLUCOMTR-MCNC: 159 MG/DL — HIGH (ref 70–99)
GLUCOSE SERPL-MCNC: 159 MG/DL — HIGH (ref 70–99)
HCT VFR BLD CALC: 40.3 % — SIGNIFICANT CHANGE UP (ref 34.5–45)
HGB BLD-MCNC: 13.2 G/DL — SIGNIFICANT CHANGE UP (ref 11.5–15.5)
INR BLD: 1.37 — HIGH (ref 0.88–1.16)
MAGNESIUM SERPL-MCNC: 1.8 MG/DL — SIGNIFICANT CHANGE UP (ref 1.6–2.6)
MCHC RBC-ENTMCNC: 29.5 PG — SIGNIFICANT CHANGE UP (ref 27–34)
MCHC RBC-ENTMCNC: 32.8 G/DL — SIGNIFICANT CHANGE UP (ref 32–36)
MCV RBC AUTO: 90.2 FL — SIGNIFICANT CHANGE UP (ref 80–100)
PLATELET # BLD AUTO: 185 K/UL — SIGNIFICANT CHANGE UP (ref 150–400)
POTASSIUM SERPL-MCNC: 3.7 MMOL/L — SIGNIFICANT CHANGE UP (ref 3.5–5.3)
POTASSIUM SERPL-SCNC: 3.7 MMOL/L — SIGNIFICANT CHANGE UP (ref 3.5–5.3)
PROTHROM AB SERPL-ACNC: 15.3 SEC — HIGH (ref 9.8–12.7)
RBC # BLD: 4.47 M/UL — SIGNIFICANT CHANGE UP (ref 3.8–5.2)
RBC # FLD: 13.3 % — SIGNIFICANT CHANGE UP (ref 10.3–16.9)
SODIUM SERPL-SCNC: 141 MMOL/L — SIGNIFICANT CHANGE UP (ref 135–145)
WBC # BLD: 6.5 K/UL — SIGNIFICANT CHANGE UP (ref 3.8–10.5)
WBC # FLD AUTO: 6.5 K/UL — SIGNIFICANT CHANGE UP (ref 3.8–10.5)

## 2018-09-04 PROCEDURE — 93458 L HRT ARTERY/VENTRICLE ANGIO: CPT | Mod: 26,XU

## 2018-09-04 PROCEDURE — 92928 PRQ TCAT PLMT NTRAC ST 1 LES: CPT | Mod: RC

## 2018-09-04 PROCEDURE — 99232 SBSQ HOSP IP/OBS MODERATE 35: CPT | Mod: GC

## 2018-09-04 PROCEDURE — 99232 SBSQ HOSP IP/OBS MODERATE 35: CPT

## 2018-09-04 RX ORDER — INSULIN LISPRO 100/ML
4 VIAL (ML) SUBCUTANEOUS
Qty: 0 | Refills: 0 | Status: DISCONTINUED | OUTPATIENT
Start: 2018-09-04 | End: 2018-09-05

## 2018-09-04 RX ORDER — ATORVASTATIN CALCIUM 80 MG/1
80 TABLET, FILM COATED ORAL AT BEDTIME
Qty: 0 | Refills: 0 | Status: DISCONTINUED | OUTPATIENT
Start: 2018-09-04 | End: 2018-09-05

## 2018-09-04 RX ORDER — AMLODIPINE BESYLATE 2.5 MG/1
5 TABLET ORAL DAILY
Qty: 0 | Refills: 0 | Status: DISCONTINUED | OUTPATIENT
Start: 2018-09-04 | End: 2018-09-05

## 2018-09-04 RX ORDER — MAGNESIUM OXIDE 400 MG ORAL TABLET 241.3 MG
400 TABLET ORAL ONCE
Qty: 0 | Refills: 0 | Status: COMPLETED | OUTPATIENT
Start: 2018-09-04 | End: 2018-09-04

## 2018-09-04 RX ORDER — ACETAMINOPHEN 500 MG
650 TABLET ORAL ONCE
Qty: 0 | Refills: 0 | Status: COMPLETED | OUTPATIENT
Start: 2018-09-04 | End: 2018-09-04

## 2018-09-04 RX ORDER — POTASSIUM CHLORIDE 20 MEQ
40 PACKET (EA) ORAL ONCE
Qty: 0 | Refills: 0 | Status: COMPLETED | OUTPATIENT
Start: 2018-09-04 | End: 2018-09-04

## 2018-09-04 RX ORDER — AMLODIPINE BESYLATE 2.5 MG/1
5 TABLET ORAL ONCE
Qty: 0 | Refills: 0 | Status: COMPLETED | OUTPATIENT
Start: 2018-09-04 | End: 2018-09-04

## 2018-09-04 RX ORDER — SODIUM CHLORIDE 9 MG/ML
500 INJECTION INTRAMUSCULAR; INTRAVENOUS; SUBCUTANEOUS
Qty: 0 | Refills: 0 | Status: DISCONTINUED | OUTPATIENT
Start: 2018-09-04 | End: 2018-09-05

## 2018-09-04 RX ORDER — HYDRALAZINE HCL 50 MG
10 TABLET ORAL ONCE
Qty: 0 | Refills: 0 | Status: COMPLETED | OUTPATIENT
Start: 2018-09-04 | End: 2018-09-04

## 2018-09-04 RX ADMIN — MAGNESIUM OXIDE 400 MG ORAL TABLET 400 MILLIGRAM(S): 241.3 TABLET ORAL at 10:25

## 2018-09-04 RX ADMIN — Medication 10 MILLIGRAM(S): at 14:45

## 2018-09-04 RX ADMIN — AMLODIPINE BESYLATE 5 MILLIGRAM(S): 2.5 TABLET ORAL at 17:47

## 2018-09-04 RX ADMIN — CLOPIDOGREL BISULFATE 75 MILLIGRAM(S): 75 TABLET, FILM COATED ORAL at 06:07

## 2018-09-04 RX ADMIN — Medication 2: at 21:34

## 2018-09-04 RX ADMIN — Medication 2: at 06:58

## 2018-09-04 RX ADMIN — Medication 81 MILLIGRAM(S): at 06:07

## 2018-09-04 RX ADMIN — ATORVASTATIN CALCIUM 80 MILLIGRAM(S): 80 TABLET, FILM COATED ORAL at 21:34

## 2018-09-04 RX ADMIN — Medication 650 MILLIGRAM(S): at 21:34

## 2018-09-04 RX ADMIN — Medication 2: at 17:05

## 2018-09-04 RX ADMIN — Medication 650 MILLIGRAM(S): at 07:31

## 2018-09-04 RX ADMIN — INSULIN GLARGINE 10 UNIT(S): 100 INJECTION, SOLUTION SUBCUTANEOUS at 21:56

## 2018-09-04 RX ADMIN — AMLODIPINE BESYLATE 5 MILLIGRAM(S): 2.5 TABLET ORAL at 21:34

## 2018-09-04 RX ADMIN — Medication 40 MILLIEQUIVALENT(S): at 10:25

## 2018-09-04 RX ADMIN — Medication 4 UNIT(S): at 17:05

## 2018-09-04 RX ADMIN — Medication 650 MILLIGRAM(S): at 22:34

## 2018-09-04 RX ADMIN — HEPARIN SODIUM 800 UNIT(S)/HR: 5000 INJECTION INTRAVENOUS; SUBCUTANEOUS at 07:29

## 2018-09-04 RX ADMIN — HEPARIN SODIUM 0 UNIT(S)/HR: 5000 INJECTION INTRAVENOUS; SUBCUTANEOUS at 06:57

## 2018-09-04 RX ADMIN — LOSARTAN POTASSIUM 25 MILLIGRAM(S): 100 TABLET, FILM COATED ORAL at 06:07

## 2018-09-04 RX ADMIN — Medication 650 MILLIGRAM(S): at 06:08

## 2018-09-04 NOTE — PROGRESS NOTE ADULT - ASSESSMENT
63yoF with PMH HTN, HLD, DM2, NATHAN (CPAP), Obesity not on any medications has not seen a physician in 5 years, presenting with chest pain after an argument, found to have normal EKG but increasing troponins, now s/p catheterization with DERIK to RCA this Afternoon.     1. DM2  <pending rounds with attending for finalized plan>  A1C 11.1.   -Continue with Lantus 10U & moderate ISS  -monitor FSG    Pt does not have endocrinologist, please make appt with office. 63yoF with PMH HTN, HLD, DM2, NATHAN (CPAP), Obesity not on any medications has not seen a physician in 5 years, presenting with chest pain after an argument, found to have normal EKG but increasing troponins, now s/p catheterization with DERIK to RCA this Afternoon. Endocrine consulted for uncontrolled DM2 not taking her Metformin for 5 years now.     1. DM2  A1C 11.1. Pt has had medication noncompliance for the past 5 years as well as dietary noncompliance. While on the hospital dietary regimen, pt has had premeals requiring between 2-4 coverage, therefore diet seems to be main contribution to her uncontrolled diabetes.   -Continue with Lantus 10U & moderate ISS, and add 4UTID Humalog.   -monitor FSG    2. Hirsutism  - Pt with hirsutism as well as hoarse voice, consider work-up to rule out any high androgen secretion. Consider outpatient workup with total/free testosterone, Estradiol, and FSH/LH .     Pt does not have endocrinologist, please make appt with office.

## 2018-09-04 NOTE — PROGRESS NOTE ADULT - SUBJECTIVE AND OBJECTIVE BOX
Interventional Cardiology PA Adult Progress Note    CC: NSTEMI   Subjective Assessment: Pt seen and examined at bedside this AM. Pt without complaints today, denies CP or SOB.     12 Point review of systems otherwise negative except for subjective HPI.  	  MEDICATIONS:  losartan 25 milliGRAM(s) Oral daily        acetaminophen   Tablet. 650 milliGRAM(s) Oral every 6 hours PRN    pantoprazole   Suspension 40 milliGRAM(s) Oral before breakfast    atorvastatin 40 milliGRAM(s) Oral at bedtime  dextrose 40% Gel 15 Gram(s) Oral once PRN  dextrose 50% Injectable 12.5 Gram(s) IV Push once  dextrose 50% Injectable 25 Gram(s) IV Push once  dextrose 50% Injectable 25 Gram(s) IV Push once  glucagon  Injectable 1 milliGRAM(s) IntraMuscular once PRN  insulin glargine Injectable (LANTUS) 10 Unit(s) SubCutaneous at bedtime  insulin lispro (HumaLOG) corrective regimen sliding scale   SubCutaneous Before meals and at bedtime    aspirin enteric coated 81 milliGRAM(s) Oral daily  clopidogrel Tablet 75 milliGRAM(s) Oral daily  dextrose 5%. 1000 milliLiter(s) IV Continuous <Continuous>  heparin  Infusion.  Unit(s)/Hr IV Continuous <Continuous>  heparin  Injectable 6000 Unit(s) IV Push every 6 hours PRN  influenza   Vaccine 0.5 milliLiter(s) IntraMuscular once  sodium chloride 0.9%. 1000 milliLiter(s) IV Continuous <Continuous>      	    [PHYSICAL EXAM:  TELEMETRY:  T(C): 36.5 (09-04-18 @ 10:04), Max: 37.1 (09-03-18 @ 19:26)  HR: 49 (09-04-18 @ 09:00) (45 - 59)  BP: 132/87 (09-04-18 @ 08:20) (132/87 - 174/79)  RR: 18 (09-04-18 @ 08:20) (16 - 18)  SpO2: 98% (09-04-18 @ 08:20) (96% - 99%)  Wt(kg): --  I&O's Summary    03 Sep 2018 07:01  -  04 Sep 2018 07:00  --------------------------------------------------------  IN: 420 mL / OUT: 0 mL / NET: 420 mL    04 Sep 2018 07:01  -  04 Sep 2018 12:41  --------------------------------------------------------  IN: 0 mL / OUT: 0 mL / NET: 0 mL        Bower:  Central/PICC/Mid Line:                                         Appearance: Normal	  HEENT:   Normal oral mucosa, PERRL, EOMI	  Neck: Supple, - JVD  Cardiovascular: Normal S1 S2, No JVD, No murmurs  Respiratory: Lungs clear to auscultation, No Rales, Rhonchi, Wheezing	  Gastrointestinal:  Soft, Non-tender, + BS	  Skin: No rashes, No ecchymoses, No cyanosis  Extremities: Normal range of motion, No clubbing, cyanosis or edema  Vascular: Peripheral pulses palpable 2+ bilaterally  Neurologic: Non-focal  Psychiatry: A & O x 3, Mood & affect appropriate  ASA: II  Mallamapati: III    	    ECG:  	  RADIOLOGY:   DIAGNOSTIC TESTING:  [ ] Echocardiogram:  [ ]  Catheterization:  [ ] Stress Test:    [ ] NASIR  OTHER: 	    LABS:	 	  CARDIAC MARKERS:                                  13.2   6.5   )-----------( 185      ( 04 Sep 2018 06:24 )             40.3     09-04    141  |  102  |  13  ----------------------------<  159<H>  3.7   |  29  |  1.01    Ca    9.4      04 Sep 2018 06:24  Mg     1.8     09-04      proBNP:   Lipid Profile:   HgA1c:   TSH:   PT/INR - ( 04 Sep 2018 06:24 )   PT: 15.3 sec;   INR: 1.37          PTT - ( 04 Sep 2018 06:24 )  PTT:89.2 sec    ASSESSMENT/PLAN: 	        DVT ppx:  Dispo:

## 2018-09-04 NOTE — PROGRESS NOTE ADULT - PROBLEM SELECTOR PLAN 3
add protonix -Not on any medications at home  -A1C: 11.1, Endo consulted   -Per endocrine add Lantus 10 units at bedtime   -FS/ISS

## 2018-09-04 NOTE — PROGRESS NOTE ADULT - ASSESSMENT
63 year old female current smoker with PMHx of sherry on cpap and HTN(not on medications) who presents to [facility] ED with complaints of chest pain. Patient reports having an argument and being very stressed when she started to develop chest pain. Pain is described as a sharp stabbing in the center of her chest with radiation to her left arm.  During episode she also developed SOB. She denies orthopnea, pnd, palps, dizziness, le edema or syncope. CXR without infiltrates. Since arrival to [facility] she is pain free. 12 Lead EKG reveals SR with late transition and NSST changes. Troponin trended up with peak 0.22 and R/I NSTEMI. Aspirin 325mg x1 and plavix 600 mg x1  loaded, heparin gtt started 9/2/18. Pt is NPO after midnight for cardiac catheterization Tuesday 9/4/18 w/ Dr. Schroeder.

## 2018-09-04 NOTE — DISCHARGE NOTE ADULT - MEDICATION SUMMARY - MEDICATIONS TO TAKE
I will START or STAY ON the medications listed below when I get home from the hospital:    ALCOHOL SWABS  -- Apply on skin to affected area 3 times a day (before meals)   -- Indication: For Diabetes    TEST STRIPS  -- Apply on skin to affected area 3 times a day (before meals)   -- Indication: For Diabetes    Lancets  -- Apply on skin to affected area 3 times a day (before meals)   -- Indication: For Diabetes    Glucometer  -- Apply on skin to affected area 3 times a day (before meals)   -- Indication: For Diabetes    aspirin 81 mg oral delayed release tablet  -- 1 tab(s) by mouth once a day  -- Indication: For Coronary artery disease, keeps stent open    losartan 25 mg oral tablet  -- 1 tab(s) by mouth once a day  -- Indication: For blood pressure    Lantus Solostar Pen 100 units/mL subcutaneous solution  -- 12 unit(s) subcutaneous once a day (at bedtime)   -- Do not drink alcoholic beverages when taking this medication.  It is very important that you take or use this exactly as directed.  Do not skip doses or discontinue unless directed by your doctor.  Keep in refrigerator.  Do not freeze.    -- Indication: For Diabetes    metFORMIN 500 mg oral tablet  -- 1 tab(s) by mouth 2 times a day   -- Check with your doctor before becoming pregnant.  Do not drink alcoholic beverages when taking this medication.  It is very important that you take or use this exactly as directed.  Do not skip doses or discontinue unless directed by your doctor.  Obtain medical advice before taking any non-prescription drugs as some may affect the action of this medication.  Take with food or milk.    -- Indication: For PLEASE START ON FRIDAY 9/7/2018, Diabetes     HumaLOG KwikPen 200 units/mL (Concentrated) subcutaneous solution  -- 6 unit(s) subcutaneous 3 times a day (before meals)   -- Indication: For Diabetes    atorvastatin 80 mg oral tablet  -- 1 tab(s) by mouth once a day (at bedtime)  -- Indication: For CHolesterol     clopidogrel 75 mg oral tablet  -- 1 tab(s) by mouth once a day  -- Indication: For Coronary artery disease, keeps stent open    amLODIPine 5 mg oral tablet  -- 1 tab(s) by mouth once a day  -- Indication: For blood pressure

## 2018-09-04 NOTE — PHYSICAL THERAPY INITIAL EVALUATION ADULT - ADDITIONAL COMMENTS
Pt is independent ambulator, using SC inside the home and rollator outside for longer distances (DME used due to hip pain.) She reports going through a separation from her , so it currently splitting time between a friend's house and her own home. Reports both locations have elevator access. Per patient reports,  is currently patient at [facility] as well.

## 2018-09-04 NOTE — DISCHARGE NOTE ADULT - CARE PROVIDER_API CALL
Katja Patterson (MD), Cardiology  [address] [place] [address]  Phone: [phone]  Fax: [phone] Katja Patterson), Cardiology  [address]  [address]  [place] [address]  Phone: [phone]  Fax: [phone]    Tiffanie Parsons  [address]  [address], [address]  [place], [place], [address]  Phone: [phone]  Fax: [phone]

## 2018-09-04 NOTE — DISCHARGE NOTE ADULT - INSTRUCTIONS
You do not need to keep this area covered and you may shower  Please avoid any heavy lifting  (no more than 3 to 5 lbs) or strenuous activity for five days  If you develop any swelling, bleeding, hardening of the skin (hematoma formation), acute pain, numbness/tingling  in you leg please contact your doctor immediately or call our 24/7 line: [phone]

## 2018-09-04 NOTE — DISCHARGE NOTE ADULT - CARE PLAN
Principal Discharge DX:	NSTEMI (non-ST elevated myocardial infarction)  Secondary Diagnosis:	HTN (hypertension) Principal Discharge DX:	NSTEMI (non-ST elevated myocardial infarction)  Goal:	Please continue aspirin 81 mg oral daily and plavix 75 mg oral daily  Assessment and plan of treatment:	You were admitted for chest pain and based on your symptoms and lab worked were found to be having a heart attack. You underwent a cardiac catheterization and received a stent to the right coronary artery, the suspected blockage causing the heart attack. The procedure was done through the right groin.  You do not need to keep this area covered and you may shower  Please avoid any heavy lifting  (no more than 3 to 5 lbs) or strenuous activity for five days  If you develop any swelling, bleeding, hardening of the skin (hematoma formation), acute pain, numbness/tingling  in you leg please contact your doctor immediately or call our 24/7 line: [phone]  NEVER MISS A DOSE OF ASPIRIN OR PLAVIX; IF YOU DO, YOU ARE AT RISK OF YOUR STENT CLOSING AND HAVING A HEART ATTACK. DO NOT STOP THESE TWO MEDICATIONS UNLESS INSTRUCTED TO DO SO BY YOUR CARDIOLOGIST.    Please follow up with your cardiologist Dr. Preciado on  Secondary Diagnosis:	HTN (hypertension)  Goal:	Please continue amlodipine 5 mg oral daily, losartan 25 mg oral daily  Secondary Diagnosis:	Diabetes  Assessment and plan of treatment:	Please follow up with Dr. Parsons in 1-2 weeks, please call the office to schedule an appointment.  Secondary Diagnosis:	Hyperlipidemia  Goal:	Please continue atorvastatin (Lipitor) 80 mg oral daily  Assessment and plan of treatment:	Your cholesterol panel is abnormal. Your LDL is 121 mg/dL and your goal LDL is less than 100 mg/dL. LDL is also known as "bad cholesterol" because it takes cholesterol to your arteries, where it may collect in artery walls. Too much cholesterol in your arteries may lead to a buildup of plaque known as atherosclerosis and can cause heart disease.   -Your HDL is 36 mg/dL and your goal HDL is greater than 50 mg/dL. The higher the HDL the better; HDL is known as “good cholesterol” because it transports cholesterol to your liver to be expelled from your body. Principal Discharge DX:	NSTEMI (non-ST elevated myocardial infarction)  Goal:	Please continue aspirin 81 mg oral daily and plavix 75 mg oral daily  Assessment and plan of treatment:	You were admitted for chest pain and based on your symptoms and lab worked were found to be having a heart attack. You underwent a cardiac catheterization and received a stent to the right coronary artery, the suspected blockage causing the heart attack. The procedure was done through the right groin.  You do not need to keep this area covered and you may shower  Please avoid any heavy lifting  (no more than 3 to 5 lbs) or strenuous activity for five days  If you develop any swelling, bleeding, hardening of the skin (hematoma formation), acute pain, numbness/tingling  in you leg please contact your doctor immediately or call our 24/7 line: [phone]  NEVER MISS A DOSE OF ASPIRIN OR PLAVIX; IF YOU DO, YOU ARE AT RISK OF YOUR STENT CLOSING AND HAVING A HEART ATTACK. DO NOT STOP THESE TWO MEDICATIONS UNLESS INSTRUCTED TO DO SO BY YOUR CARDIOLOGIST.    Please follow up with your cardiologist Dr. Preciado on Thursday 9/13/2018 @10:45 AM. Please bring your insurance cards, photo ID, and arrive 15 minutes early.  Secondary Diagnosis:	HTN (hypertension)  Goal:	Please continue amlodipine 5 mg oral daily, losartan 25 mg oral daily  Secondary Diagnosis:	Diabetes  Goal:	Please take Lantus 12 Units subcutaneously at bedtime and lispro 6 units subcutaneous with meals. Please START metformin 500 mg oral twice daily on Friday 9/7/18  Assessment and plan of treatment:	Your Hemoglobin A1c is 11.1% . Your goal Hemoglobin A1c is less than 7.0%. This number measures your average blood sugar level over the last three months.  Ways to lower this number include: diet, exercise, monitoring your fingersticks, adhering to your medication regimen and regular follow up during you Endocrinologist/Primary Care Doctor.     Please follow up with the Endocrinologist Dr. Parsons in 1-2 weeks, please call the office to schedule an appointment.  Secondary Diagnosis:	Hyperlipidemia  Goal:	Please continue atorvastatin (Lipitor) 80 mg oral daily  Assessment and plan of treatment:	Your cholesterol panel is abnormal. Your LDL is 121 mg/dL and your goal LDL is less than 100 mg/dL. LDL is also known as "bad cholesterol" because it takes cholesterol to your arteries, where it may collect in artery walls. Too much cholesterol in your arteries may lead to a buildup of plaque known as atherosclerosis and can cause heart disease.   -Your HDL is 36 mg/dL and your goal HDL is greater than 50 mg/dL. The higher the HDL the better; HDL is known as “good cholesterol” because it transports cholesterol to your liver to be expelled from your body.

## 2018-09-04 NOTE — PROGRESS NOTE ADULT - SUBJECTIVE AND OBJECTIVE BOX
Procedure: LHC, DERIK of mRCA, Angioseal  Indication: NSTEMI, CAD  Complication: none    Result:  1) Two vessel CAD (90% mRCA, 50% pLAD)  2) Normal LV function with inferior hypokinesis EF 55%, LVEDP 14  3) Successful DERIK of mRCA with 3.5x15mm Resolute stent        Plan: Plavix + ASA x 12 months.

## 2018-09-04 NOTE — PHYSICAL THERAPY INITIAL EVALUATION ADULT - DISCHARGE PLANNER MADE AWARE
[facility]  Progress Note    Farzad Rota Patient Status:  Inpatient    2018 MRN [id number]   [facility] 1SE-B Attending Sary Bo MD   [facility] Day # 4 PCP Luigi López MD       Follow up:  Viral meningitis    Subjective:  Pe lethargy (improving) and diarrhea (resolved) found to have leukocytosis with left shift, elevated inflammatory markers, high CSF protein with pleocytosis all believed to be secondary to viral meningitis. CSF and Blood cx negative. HSV PCR negative.  Patient yes

## 2018-09-04 NOTE — DISCHARGE NOTE ADULT - HOSPITAL COURSE
63 year old female current smoker with PMHx of sherry on cpap and HTN(not on medications) who presents to [facility] ED with complaints of chest pain. Patient reports having an argument and being very stressed when she started to develop chest pain. Pain is described as a sharp stabbing in the center of her chest with radiation to her left arm.  During episode she also developed SOB. She denies orthopnea, pnd, palps, dizziness, le edema or syncope. CXR without infiltrates. Since arrival to [facility] she is pain free. 12 Lead EKG reveals SR with late transition and NSST changes. Troponin trended up with peak 0.22 and R/I NSTEMI. Aspirin 325mg x1 and plavix 600 mg x1  loaded, heparin gtt started 9/2/18. Pt was NPO after midnight for cardiac catheterization Tuesday 9/4/18 w/ Dr. Schroeder.  Pt is now s/p cardiac catheterization 9/4/18 with PTCA/DERIK mRCA 99% lesion, mLAD 50%, pLCx 30-50%, EF 50% EDP 14, right groin angioseal. Pt admitted overnight for observation.  VSS  Labs checked  Groin site  Pt to be discharged on aspirin 81 mg oral daily, plavix 75 mg oral daily, losartan 25 mg oral daily, Beta blocker??    Pt does not have a cardiologist and will follow up with Dr. Preciado on_____ 63 year old female current smoker with PMHx of sherry on cpap and HTN(not on medications) who presents to [facility] ED with complaints of chest pain. Patient reports having an argument and being very stressed when she started to develop chest pain. Pain is described as a sharp stabbing in the center of her chest with radiation to her left arm.  During episode she also developed SOB. She denies orthopnea, pnd, palps, dizziness, le edema or syncope. CXR without infiltrates. Since arrival to [facility] she is pain free. 12 Lead EKG reveals SR with late transition and NSST changes. Troponin trended up with peak 0.22 and R/I NSTEMI. Aspirin 325mg x1 and plavix 600 mg x1  loaded, heparin gtt started 9/2/18. Pt was NPO after midnight for cardiac catheterization Tuesday 9/4/18 w/ Dr. Schroeder.  Pt is now s/p cardiac catheterization 9/4/18 with PTCA/DERIK mRCA 99% lesion, mLAD 50%, pLCx 30-50%, EF 50% EDP 14, right groin angioseal. Pt admitted overnight for observation. VSS  Labs checked, no events on telemetry, Groin site stable without hematoma, bleeding, distal pulses intact.  Pt to be discharged on aspirin 81 mg oral daily, plavix 75 mg oral daily, losartan 25 mg oral daily, amlodipine 5 mg oral daily, Beta blocker held 2/2 bradycardia. Endocrine recs ______    Pt does not have a cardiologist and will follow up with Dr. Preciado on_____ and endocrine in 1-2 weeks. 63 year old female current smoker with PMHx of sherry on cpap and HTN(not on medications) who presents to [facility] ED with complaints of chest pain. Patient reports having an argument and being very stressed when she started to develop chest pain. Pain is described as a sharp stabbing in the center of her chest with radiation to her left arm.  During episode she also developed SOB. She denies orthopnea, pnd, palps, dizziness, le edema or syncope. CXR without infiltrates. Since arrival to [facility] she is pain free. 12 Lead EKG reveals SR with late transition and NSST changes. Troponin trended up with peak 0.22 and R/I NSTEMI. Aspirin 325mg x1 and plavix 600 mg x1  loaded, heparin gtt started 9/2/18. Pt was NPO after midnight for cardiac catheterization Tuesday 9/4/18 w/ Dr. Schroeder.  Pt is now s/p cardiac catheterization 9/4/18 with PTCA/DERIK mRCA 99% lesion, mLAD 50%, pLCx 30-50%, EF 50% EDP 14, right groin angioseal. Pt admitted overnight for observation. VSS  Labs checked, no events on telemetry, Groin site stable without hematoma, bleeding, distal pulses intact.  Pt to be discharged on aspirin 81 mg oral daily, plavix 75 mg oral daily, losartan 25 mg oral daily, amlodipine 5 mg oral daily, Beta blocker held 2/2 bradycardia. Endocrine recs discharge with metformin 500 mg BID to be started on Friday 9/7/18, Lantus 12 units at bedtime, and lispro 6 units TID before meals. Pt received diabetes education from the nurse prior to discharge. Pt provided paper scripts to take to the pharmacy for glucometer, test strips, lancets, and alcohol pads.  Pt does not have a cardiologist and will follow up with Dr. Preciado on Thursday 9/13/18 @ 10:45 AM and endocrine in 1-2 weeks.

## 2018-09-04 NOTE — DISCHARGE NOTE ADULT - PATIENT PORTAL LINK FT
You can access the [facility] Patient Portal, offered by [facility], by registering with the following website: [email or web address]

## 2018-09-04 NOTE — DISCHARGE NOTE ADULT - PLAN OF CARE
Please continue aspirin 81 mg oral daily and plavix 75 mg oral daily You were admitted for chest pain and based on your symptoms and lab worked were found to be having a heart attack. You underwent a cardiac catheterization and received a stent to the right coronary artery, the suspected blockage causing the heart attack. The procedure was done through the right groin.  You do not need to keep this area covered and you may shower  Please avoid any heavy lifting  (no more than 3 to 5 lbs) or strenuous activity for five days  If you develop any swelling, bleeding, hardening of the skin (hematoma formation), acute pain, numbness/tingling  in you leg please contact your doctor immediately or call our 24/7 line: [phone]  NEVER MISS A DOSE OF ASPIRIN OR PLAVIX; IF YOU DO, YOU ARE AT RISK OF YOUR STENT CLOSING AND HAVING A HEART ATTACK. DO NOT STOP THESE TWO MEDICATIONS UNLESS INSTRUCTED TO DO SO BY YOUR CARDIOLOGIST.    Please follow up with your cardiologist Dr. Preciado on Please continue amlodipine 5 mg oral daily, losartan 25 mg oral daily Please follow up with Dr. Parsons in 1-2 weeks, please call the office to schedule an appointment. Please continue atorvastatin (Lipitor) 80 mg oral daily Your cholesterol panel is abnormal. Your LDL is 121 mg/dL and your goal LDL is less than 100 mg/dL. LDL is also known as "bad cholesterol" because it takes cholesterol to your arteries, where it may collect in artery walls. Too much cholesterol in your arteries may lead to a buildup of plaque known as atherosclerosis and can cause heart disease.   -Your HDL is 36 mg/dL and your goal HDL is greater than 50 mg/dL. The higher the HDL the better; HDL is known as “good cholesterol” because it transports cholesterol to your liver to be expelled from your body. You were admitted for chest pain and based on your symptoms and lab worked were found to be having a heart attack. You underwent a cardiac catheterization and received a stent to the right coronary artery, the suspected blockage causing the heart attack. The procedure was done through the right groin.  You do not need to keep this area covered and you may shower  Please avoid any heavy lifting  (no more than 3 to 5 lbs) or strenuous activity for five days  If you develop any swelling, bleeding, hardening of the skin (hematoma formation), acute pain, numbness/tingling  in you leg please contact your doctor immediately or call our 24/7 line: [phone]  NEVER MISS A DOSE OF ASPIRIN OR PLAVIX; IF YOU DO, YOU ARE AT RISK OF YOUR STENT CLOSING AND HAVING A HEART ATTACK. DO NOT STOP THESE TWO MEDICATIONS UNLESS INSTRUCTED TO DO SO BY YOUR CARDIOLOGIST.    Please follow up with your cardiologist Dr. Preciado on Thursday 9/13/2018 @10:45 AM. Please bring your insurance cards, photo ID, and arrive 15 minutes early. Please take Lantus 12 Units subcutaneously at bedtime and lispro 6 units subcutaneous with meals. Please START metformin 500 mg oral twice daily on Friday 9/7/18 Your Hemoglobin A1c is 11.1% . Your goal Hemoglobin A1c is less than 7.0%. This number measures your average blood sugar level over the last three months.  Ways to lower this number include: diet, exercise, monitoring your fingersticks, adhering to your medication regimen and regular follow up during you Endocrinologist/Primary Care Doctor.     Please follow up with the Endocrinologist Dr. Parsons in 1-2 weeks, please call the office to schedule an appointment.

## 2018-09-04 NOTE — PROGRESS NOTE ADULT - SUBJECTIVE AND OBJECTIVE BOX
INTERVAL HPI/OVERNIGHT EVENTS: NAEO Pt NPO for Cath    63yoF with PMH HTN, HLD, DM2, NATHAN (CPAP), Obesity not on any medications has not seen a physician in 5 years, presenting with chest pain after an argument, found to have normal EKG but increasing troponins, now s/p catheterization with DERIK to RCA this Afternoon.    Pt was diagnosed around 10 years ago upon which she was started on Metformin 500BID. Pt moved and stopped seeing her PCP 5 years ago and has not been on any medications. Pt has never been on other OAD or Insulin. Pt denies any previous history of DKA, HHS, or hypoglycemic hospitalizations. She does not check her FSG. She mentions her diet usually consists of oatmeal, cheerios, eggs and toast along with some vegetables at times, but mentions she rarely has an appetite because she has chronic history of nausea. Her last eye exam was around 2005 after she had R-eye cataract surgery. Pt does not have a podiatrist but mentions she checks her feet daily. Pt does not know her family history.     Pt also mentions she used to see an Endocrinologist for a left-sided neck mass which was not malignant but was causing pain and discomfort. She has not followed up since around 5 years.     This afternoon, pt had just returned from cath, mentioning she had appropriate pain at the sites but denied any chest pain or SOB. Pt was NPO through the night and had not had lunch yet.       MEDICATIONS  (STANDING):  aspirin enteric coated 81 milliGRAM(s) Oral daily  atorvastatin 40 milliGRAM(s) Oral at bedtime  clopidogrel Tablet 75 milliGRAM(s) Oral daily  dextrose 5%. 1000 milliLiter(s) (50 mL/Hr) IV Continuous <Continuous>  dextrose 50% Injectable 12.5 Gram(s) IV Push once  dextrose 50% Injectable 25 Gram(s) IV Push once  dextrose 50% Injectable 25 Gram(s) IV Push once  hydrALAZINE Injectable 10 milliGRAM(s) IV Push once  influenza   Vaccine 0.5 milliLiter(s) IntraMuscular once  insulin glargine Injectable (LANTUS) 10 Unit(s) SubCutaneous at bedtime  insulin lispro (HumaLOG) corrective regimen sliding scale   SubCutaneous Before meals and at bedtime  losartan 25 milliGRAM(s) Oral daily  pantoprazole   Suspension 40 milliGRAM(s) Oral before breakfast  sodium chloride 0.9%. 500 milliLiter(s) (75 mL/Hr) IV Continuous <Continuous>    MEDICATIONS  (PRN):  acetaminophen   Tablet. 650 milliGRAM(s) Oral every 6 hours PRN Mild Pain (1 - 3)  dextrose 40% Gel 15 Gram(s) Oral once PRN Blood Glucose LESS THAN 70 milliGRAM(s)/deciliter  glucagon  Injectable 1 milliGRAM(s) IntraMuscular once PRN Glucose LESS THAN 70 milligrams/deciliter      PHYSICAL EXAM  Vital Signs Last 24 Hrs  T(C): 36.5 (04 Sep 2018 10:04), Max: 37.1 (03 Sep 2018 19:26)  T(F): 97.7 (04 Sep 2018 10:04), Max: 98.8 (03 Sep 2018 23:49)  HR: 49 (04 Sep 2018 09:00) (45 - 59)  BP: 132/87 (04 Sep 2018 08:20) (132/87 - 174/79)  BP(mean): --  RR: 18 (04 Sep 2018 08:20) (16 - 18)  SpO2: 98% (04 Sep 2018 08:20) (96% - 99%)    Constitutional: wn/wd in NAD.   HEENT: NCAT, MMM, OP clear, EOMI, no proptosis or lid retraction  Neck: no thyromegaly or palpable thyroid nodules   Respiratory: lungs CTAB.  Cardiovascular: regular rhythm, normal S1 and S2, no audible murmurs, no peripheral edema  GI: soft, NT/ND, no masses/HSM appreciated.  Neurology: no tremors, DTR 2+  Skin: no visible rashes/lesions  Psychiatric: AAO x 3, normal affect/mood.    LABS:                        13.2   6.5   )-----------( 185      ( 04 Sep 2018 06:24 )             40.3     09-04    141  |  102  |  13  ----------------------------<  159<H>  3.7   |  29  |  1.01    Ca    9.4      04 Sep 2018 06:24  Mg     1.8     09-04      PT/INR - ( 04 Sep 2018 06:24 )   PT: 15.3 sec;   INR: 1.37          PTT - ( 04 Sep 2018 06:24 )  PTT:89.2 sec    Thyroid Stimulating Hormone, Serum: 1.162 uIU/mL (09-02 @ 10:48)      HbA1C: 11.1 % (09-02 @ 10:48)    CAPILLARY BLOOD GLUCOSE      POCT Blood Glucose.: 145 mg/dL (04 Sep 2018 13:07)  POCT Blood Glucose.: 125 mg/dL (04 Sep 2018 10:54)  POCT Blood Glucose.: 159 mg/dL (04 Sep 2018 06:14)  POCT Blood Glucose.: 198 mg/dL (03 Sep 2018 21:20)  POCT Blood Glucose.: 158 mg/dL (03 Sep 2018 16:38)      Insulin Sliding Scale requirements X 24 Hours:    A/P: 63y Female with history of DM type II presenting for     1.  DM -     Please continue           units lantus at bedtime  / in the morning and        units lispro with meals and lispro moderate / low dose sliding scale 4 times daily with meals and at bedtime.  Please continue consistent carbohydrate diet.      Goal FSG is   Will continue to monitor   For discharge, pt can continue    Pt can follow up with me at discharge at [address] [place], [place] [address] St by calling  to make appointment. INTERVAL HPI/OVERNIGHT EVENTS: NAEO Pt NPO for Cath    63yoF with PMH HTN, HLD, DM2, NATHAN (CPAP), Obesity not on any medications has not seen a physician in 5 years, presenting with chest pain after an argument, found to have normal EKG but increasing troponins, now s/p catheterization with DERIK to RCA this Afternoon.    Pt was diagnosed around 10 years ago upon which she was started on Metformin 500BID. Pt moved and stopped seeing her PCP 5 years ago and has not been on any medications. Pt has never been on other OAD or Insulin. Pt denies any previous history of DKA, HHS, or hypoglycemic hospitalizations. She does not check her FSG. She mentions her diet usually consists of oatmeal, cheerios, eggs and toast along with some vegetables at times, but mentions she rarely has an appetite because she has chronic history of nausea. Her last eye exam was around 2005 after she had R-eye cataract surgery. Pt does not have a podiatrist but mentions she checks her feet daily. She has experienced numbness of her toes for years along with pain in the plantar region of foot b/l. Pt does not know her family history.     Pt also mentions she used to see an Endocrinologist for a left-sided neck mass which was not malignant but was causing pain and discomfort. She has not followed up since around 5 years.     This afternoon, pt had just returned from cath, mentioning she had appropriate pain at the sites but denied any chest pain or SOB. Pt was NPO through the night and had not had lunch yet.       MEDICATIONS  (STANDING):  aspirin enteric coated 81 milliGRAM(s) Oral daily  atorvastatin 40 milliGRAM(s) Oral at bedtime  clopidogrel Tablet 75 milliGRAM(s) Oral daily  dextrose 5%. 1000 milliLiter(s) (50 mL/Hr) IV Continuous <Continuous>  dextrose 50% Injectable 12.5 Gram(s) IV Push once  dextrose 50% Injectable 25 Gram(s) IV Push once  dextrose 50% Injectable 25 Gram(s) IV Push once  hydrALAZINE Injectable 10 milliGRAM(s) IV Push once  influenza   Vaccine 0.5 milliLiter(s) IntraMuscular once  insulin glargine Injectable (LANTUS) 10 Unit(s) SubCutaneous at bedtime  insulin lispro (HumaLOG) corrective regimen sliding scale   SubCutaneous Before meals and at bedtime  losartan 25 milliGRAM(s) Oral daily  pantoprazole   Suspension 40 milliGRAM(s) Oral before breakfast  sodium chloride 0.9%. 500 milliLiter(s) (75 mL/Hr) IV Continuous <Continuous>    MEDICATIONS  (PRN):  acetaminophen   Tablet. 650 milliGRAM(s) Oral every 6 hours PRN Mild Pain (1 - 3)  dextrose 40% Gel 15 Gram(s) Oral once PRN Blood Glucose LESS THAN 70 milliGRAM(s)/deciliter  glucagon  Injectable 1 milliGRAM(s) IntraMuscular once PRN Glucose LESS THAN 70 milligrams/deciliter      PHYSICAL EXAM  Vital Signs Last 24 Hrs  T(C): 36.5 (04 Sep 2018 10:04), Max: 37.1 (03 Sep 2018 19:26)  T(F): 97.7 (04 Sep 2018 10:04), Max: 98.8 (03 Sep 2018 23:49)  HR: 49 (04 Sep 2018 09:00) (45 - 59)  BP: 132/87 (04 Sep 2018 08:20) (132/87 - 174/79)  BP(mean): --  RR: 18 (04 Sep 2018 08:20) (16 - 18)  SpO2: 98% (04 Sep 2018 08:20) (96% - 99%)    Constitutional: Obese  female in NAD   HEENT: NCAT, hirustism present   Neck: small left sided mass palpated, soft, mobile   Respiratory: lungs CTA b/l   Cardiovascular: regular rhythm, normal S1 and S2, no audible murmurs  GI: soft, NT/ND BS+   Psychiatric: AAO x 3  Ext: no LE swelling b/l     LABS:                        13.2   6.5   )-----------( 185      ( 04 Sep 2018 06:24 )             40.3     09-04    141  |  102  |  13  ----------------------------<  159<H>  3.7   |  29  |  1.01    Ca    9.4      04 Sep 2018 06:24  Mg     1.8     09-04      PT/INR - ( 04 Sep 2018 06:24 )   PT: 15.3 sec;   INR: 1.37          PTT - ( 04 Sep 2018 06:24 )  PTT:89.2 sec    Thyroid Stimulating Hormone, Serum: 1.162 uIU/mL (09-02 @ 10:48)      HbA1C: 11.1 % (09-02 @ 10:48)    CAPILLARY BLOOD GLUCOSE      POCT Blood Glucose.: 145 mg/dL (04 Sep 2018 13:07)  POCT Blood Glucose.: 125 mg/dL (04 Sep 2018 10:54)  POCT Blood Glucose.: 159 mg/dL (04 Sep 2018 06:14)  POCT Blood Glucose.: 198 mg/dL (03 Sep 2018 21:20)  POCT Blood Glucose.: 158 mg/dL (03 Sep 2018 16:38)      Insulin Sliding Scale requirements X 24 Hours:

## 2018-09-04 NOTE — PROGRESS NOTE ADULT - PROBLEM SELECTOR PLAN 1
Atpypical pain  Serial trops  Check 2D echo  If no ACS consider outpatient ischemic eval R/I NSTEMI   -Currently chest pain free, Trop x1 negative, trop up trended--> 0.10-->0.18-->0.22   - ECG non ischemic   - ECHO 9/3/18: LV wall motion normal, EF 65%, no pericardial effusion   - loaded with aspirin 325 x1 and plavix 600 mg x 1, continue with aspirin 81 mg and plavix 75 mg.  -heparin gtt started yesterday 9/2/18  -precath consented, consent in chart  -NPO after midnight for cath today  -holding BB at this time 2/2 bradycardia  -s/p cardiac cath 9/4/18 with PTCA/DERIK X1 RCA, R/I NSTEMI   -Currently chest pain free, Trop x1 negative, trop up trended--> 0.10-->0.18-->0.22   - ECG non ischemic   - ECHO 9/3/18: LV wall motion normal, EF 65%, no pericardial effusion   - loaded with aspirin 325 x1 and plavix 600 mg x 1, continue with aspirin 81 mg and plavix 75 mg.  -heparin gtt started yesterday 9/2/18  -precath consented, consent in chart  -NPO after midnight for cath today  -holding BB at this time 2/2 bradycardia  -s/p cardiac cath 9/4/18 with PTCA/DERIK x1 mRCA, R perclose, EF 50%

## 2018-09-04 NOTE — DISCHARGE NOTE ADULT - PROVIDER TOKENS
TOKLINH:'[id number]' TOKEN:'[id number]',FREE:[LAST:[Jaqueline],FIRST:[Tiffanie],PHONE:[phone]:[phone]],ADDRESS:[[address] [place] [address]  [address] [facility], [address]  [place], [place], [address]]]

## 2018-09-04 NOTE — PROGRESS NOTE ADULT - PROBLEM SELECTOR PLAN 2
Monitor SBP while admitted  Add diovan 80 -140's Today   -Continue Losartan 25mg QD  -can add on norvasc 5 mg if BP uptrends

## 2018-09-04 NOTE — PHYSICAL THERAPY INITIAL EVALUATION ADULT - PERTINENT HX OF CURRENT PROBLEM, REHAB EVAL
Patient is a 63 year old female with PMHx of sherry on cpap and HTN(not on medications) who presents to [facility] ED with complaints of chest pain.

## 2018-09-05 VITALS — TEMPERATURE: 98 F

## 2018-09-05 PROBLEM — I10 ESSENTIAL (PRIMARY) HYPERTENSION: Chronic | Status: ACTIVE | Noted: 2018-09-02

## 2018-09-05 PROBLEM — Z00.00 ENCOUNTER FOR PREVENTIVE HEALTH EXAMINATION: Status: ACTIVE | Noted: 2018-09-05

## 2018-09-05 PROBLEM — E11.9 TYPE 2 DIABETES MELLITUS WITHOUT COMPLICATIONS: Chronic | Status: ACTIVE | Noted: 2018-09-02

## 2018-09-05 PROBLEM — K21.9 GASTRO-ESOPHAGEAL REFLUX DISEASE WITHOUT ESOPHAGITIS: Chronic | Status: ACTIVE | Noted: 2018-09-02

## 2018-09-05 LAB
ANION GAP SERPL CALC-SCNC: 12 MMOL/L — SIGNIFICANT CHANGE UP (ref 5–17)
BUN SERPL-MCNC: 14 MG/DL — SIGNIFICANT CHANGE UP (ref 7–23)
CALCIUM SERPL-MCNC: 9.4 MG/DL — SIGNIFICANT CHANGE UP (ref 8.4–10.5)
CHLORIDE SERPL-SCNC: 100 MMOL/L — SIGNIFICANT CHANGE UP (ref 96–108)
CO2 SERPL-SCNC: 25 MMOL/L — SIGNIFICANT CHANGE UP (ref 22–31)
CREAT SERPL-MCNC: 0.98 MG/DL — SIGNIFICANT CHANGE UP (ref 0.5–1.3)
GLUCOSE SERPL-MCNC: 157 MG/DL — HIGH (ref 70–99)
HCT VFR BLD CALC: 42.1 % — SIGNIFICANT CHANGE UP (ref 34.5–45)
HGB BLD-MCNC: 13.9 G/DL — SIGNIFICANT CHANGE UP (ref 11.5–15.5)
MAGNESIUM SERPL-MCNC: 2 MG/DL — SIGNIFICANT CHANGE UP (ref 1.6–2.6)
MCHC RBC-ENTMCNC: 30 PG — SIGNIFICANT CHANGE UP (ref 27–34)
MCHC RBC-ENTMCNC: 33 G/DL — SIGNIFICANT CHANGE UP (ref 32–36)
MCV RBC AUTO: 90.9 FL — SIGNIFICANT CHANGE UP (ref 80–100)
PLATELET # BLD AUTO: 196 K/UL — SIGNIFICANT CHANGE UP (ref 150–400)
POTASSIUM SERPL-MCNC: 4.2 MMOL/L — SIGNIFICANT CHANGE UP (ref 3.5–5.3)
POTASSIUM SERPL-SCNC: 4.2 MMOL/L — SIGNIFICANT CHANGE UP (ref 3.5–5.3)
RBC # BLD: 4.63 M/UL — SIGNIFICANT CHANGE UP (ref 3.8–5.2)
RBC # FLD: 13.5 % — SIGNIFICANT CHANGE UP (ref 10.3–16.9)
SODIUM SERPL-SCNC: 137 MMOL/L — SIGNIFICANT CHANGE UP (ref 135–145)
WBC # BLD: 6.6 K/UL — SIGNIFICANT CHANGE UP (ref 3.8–10.5)
WBC # FLD AUTO: 6.6 K/UL — SIGNIFICANT CHANGE UP (ref 3.8–10.5)

## 2018-09-05 PROCEDURE — 99232 SBSQ HOSP IP/OBS MODERATE 35: CPT | Mod: GC

## 2018-09-05 PROCEDURE — 99231 SBSQ HOSP IP/OBS SF/LOW 25: CPT | Mod: 25

## 2018-09-05 PROCEDURE — 93010 ELECTROCARDIOGRAM REPORT: CPT

## 2018-09-05 PROCEDURE — 99239 HOSP IP/OBS DSCHRG MGMT >30: CPT

## 2018-09-05 RX ORDER — ENOXAPARIN SODIUM 100 MG/ML
12 INJECTION SUBCUTANEOUS
Qty: 4 | Refills: 3
Start: 2018-09-05 | End: 2019-01-02

## 2018-09-05 RX ORDER — ASPIRIN/CALCIUM CARB/MAGNESIUM 324 MG
1 TABLET ORAL
Qty: 30 | Refills: 11
Start: 2018-09-05 | End: 2019-08-30

## 2018-09-05 RX ORDER — LOSARTAN POTASSIUM 100 MG/1
1 TABLET, FILM COATED ORAL
Qty: 30 | Refills: 3
Start: 2018-09-05 | End: 2019-01-02

## 2018-09-05 RX ORDER — METFORMIN HYDROCHLORIDE 850 MG/1
1 TABLET ORAL
Qty: 60 | Refills: 3
Start: 2018-09-05 | End: 2019-01-02

## 2018-09-05 RX ORDER — CLOPIDOGREL BISULFATE 75 MG/1
1 TABLET, FILM COATED ORAL
Qty: 30 | Refills: 11
Start: 2018-09-05 | End: 2019-08-30

## 2018-09-05 RX ORDER — INSULIN LISPRO 100/ML
6 VIAL (ML) SUBCUTANEOUS
Qty: 3 | Refills: 3
Start: 2018-09-05 | End: 2019-01-02

## 2018-09-05 RX ORDER — ATORVASTATIN CALCIUM 80 MG/1
1 TABLET, FILM COATED ORAL
Qty: 30 | Refills: 3
Start: 2018-09-05 | End: 2019-01-02

## 2018-09-05 RX ORDER — AMLODIPINE BESYLATE 2.5 MG/1
1 TABLET ORAL
Qty: 30 | Refills: 3
Start: 2018-09-05 | End: 2019-01-02

## 2018-09-05 RX ORDER — ISOPROPYL ALCOHOL, BENZOCAINE .7; .06 ML/ML; ML/ML
3 SWAB TOPICAL
Qty: 1 | Refills: 3
Start: 2018-09-05 | End: 2019-01-02

## 2018-09-05 RX ADMIN — AMLODIPINE BESYLATE 5 MILLIGRAM(S): 2.5 TABLET ORAL at 06:18

## 2018-09-05 RX ADMIN — Medication 2: at 07:44

## 2018-09-05 RX ADMIN — Medication 4: at 11:33

## 2018-09-05 RX ADMIN — Medication 4 UNIT(S): at 07:44

## 2018-09-05 RX ADMIN — Medication 650 MILLIGRAM(S): at 04:24

## 2018-09-05 RX ADMIN — Medication 4 UNIT(S): at 11:34

## 2018-09-05 RX ADMIN — PANTOPRAZOLE SODIUM 40 MILLIGRAM(S): 20 TABLET, DELAYED RELEASE ORAL at 06:18

## 2018-09-05 RX ADMIN — Medication 650 MILLIGRAM(S): at 03:24

## 2018-09-05 RX ADMIN — LOSARTAN POTASSIUM 25 MILLIGRAM(S): 100 TABLET, FILM COATED ORAL at 06:18

## 2018-09-05 RX ADMIN — CLOPIDOGREL BISULFATE 75 MILLIGRAM(S): 75 TABLET, FILM COATED ORAL at 11:34

## 2018-09-05 RX ADMIN — Medication 81 MILLIGRAM(S): at 11:34

## 2018-09-05 NOTE — PROGRESS NOTE ADULT - SUBJECTIVE AND OBJECTIVE BOX
INTERVENTIONAL CARDIOLOGY FOLLOW UP NOTE    -Patient seen and examined this am  -No events overnight  -No complaints this am    VASCULAR ACCESS EXAM:     FEMORAL:   2+ right femoral pulse  2+ DP/PT pulses.  -Access site clean, non-tender, without ecchymosis or hematoma.    A/P  64 yo M with PMH HTN, DM, NATHAN on CPAP now s/p PCI of mid RCA with DERIK via femoral approach with no evidence of vascular complications post procedure.     -continue with current medications including dual antiplatelet therapy   -discharge instructions as per protocol   -outpatient follow up with primary cardiologist

## 2018-09-05 NOTE — PROGRESS NOTE ADULT - NSHPATTENDINGPLANDISCUSS_GEN_ALL_CORE
Dr. Parsons and patient
patient ,PA & [facility] staff.
Dr. Mao and [facility] staff
the patient and [facility] care team

## 2018-09-05 NOTE — PROGRESS NOTE ADULT - ATTENDING COMMENTS
Pt seen this afternoon on rounds prior to her discharge.  She has decided to go home on insulin, and will discharge on 12 Lantus and 6 Humalog pre-meal.  Would like to restart metformin, but will wait until she is seen at outpatient follow-up next week (since it needs to be held for another 2 days post cath).  (Plan would ideally be for a regimen with metformin and a GLP-1). Discussed diet with her in detail, jay with regard to carb consistency and limitation of fruit intake.  Will pursue workup for hyperandrogenism as an outpatient.
Agree with SOAP & she is clinically stable to undergo cardiac cath in AM.our endo team will follow her with you .
Pt seen on rounds this afternoon and events of the weekend reviewed.  There is a rather marked discordance between the rather moderate hyperglycemia in the hospital on minimal doses of insulin (with pt reporting that she has been eating) and her A1c level at home on no medication and what appears to be only mild dietary non-compliance.  Can continue Lantus at 10 units, add 4 units pre-meal, but pt will likely not want to take insulin at home (and may not need it).  The most striking things on exam are her marked facial hirsutism and masculinized appearance and voice.  Has never had a hormonal workup for this but will do as outpatient--she says that she will return to see us.  Needs testosterone and pituitary hormone levels, plus abd/pelvic ultrasound
Reviewed NP/PA/Resident documentation. Reviewed vitals, labs, medications, cardiac studies and imaging. Agree with the above with the following additions/amendments:  63F smoker with NATHAN on CPAP, Essential HTN (not on meds), who presented to [facility] c/o SSCP. EKG NSR with non specific ST TW changes. Troponin peak 0.22, pt ruled in for NSTEMI. Loaded with Aspirin 325mg x1 and plavix 600 mg x1, on heparin gtt since 9/2/18. TTE 9/3 EF 65% with normal WM. Pt underwent LHC 9/4 with Dr. Schroeder notable for 99%occlusion of mRCA treated with DERIK. LVEF 55% by Ventriculogram.      Plavix 75mg daily for at least one year  ASA 81mg daily indefinitely  Atorva 80 qHS  Losartan 25mg po initiated daily  Initiate Amlodipine 5mg po daily for BP control  Up titrate to Amlodipine 10mg po daily on 9/5AM  Holding BB d/t SB 50s  Anticipate discharge to home 9/5  Encourage CPAP use, pt non compliant with use while in house    [facility]  [facility]iology

## 2018-09-05 NOTE — PROGRESS NOTE ADULT - PROVIDER SPECIALTY LIST ADULT
Cardiology
Cardiology
Endocrinology
Endocrinology
Intervent Cardiology
Endocrinology

## 2018-09-05 NOTE — PROGRESS NOTE ADULT - SUBJECTIVE AND OBJECTIVE BOX
INTERVAL HPI/OVERNIGHT EVENTS:        Pt reports the following symptoms:    CONSTITUTIONAL:  Negative fever or chills, feels well, good appetite  EYES:  Negative  blurry vision or double vision  CARDIOVASCULAR:  Negative for chest pain or palpitations  RESPIRATORY:  Negative for cough, wheezing, or SOB   GASTROINTESTINAL:  Negative for nausea, vomiting, diarrhea, constipation, or abdominal pain  GENITOURINARY:  Negative frequency, urgency or dysuria  NEUROLOGIC:  No headache, confusion, dizziness, lightheadedness    MEDICATIONS  (STANDING):  amLODIPine   Tablet 5 milliGRAM(s) Oral daily  aspirin enteric coated 81 milliGRAM(s) Oral daily  atorvastatin 80 milliGRAM(s) Oral at bedtime  clopidogrel Tablet 75 milliGRAM(s) Oral daily  dextrose 5%. 1000 milliLiter(s) (50 mL/Hr) IV Continuous <Continuous>  dextrose 50% Injectable 12.5 Gram(s) IV Push once  dextrose 50% Injectable 25 Gram(s) IV Push once  dextrose 50% Injectable 25 Gram(s) IV Push once  insulin glargine Injectable (LANTUS) 10 Unit(s) SubCutaneous at bedtime  insulin lispro (HumaLOG) corrective regimen sliding scale   SubCutaneous Before meals and at bedtime  insulin lispro Injectable (HumaLOG) 4 Unit(s) SubCutaneous three times a day before meals  losartan 25 milliGRAM(s) Oral daily  pantoprazole   Suspension 40 milliGRAM(s) Oral before breakfast  sodium chloride 0.9%. 500 milliLiter(s) (75 mL/Hr) IV Continuous <Continuous>    MEDICATIONS  (PRN):  acetaminophen   Tablet. 650 milliGRAM(s) Oral every 6 hours PRN Mild Pain (1 - 3)  dextrose 40% Gel 15 Gram(s) Oral once PRN Blood Glucose LESS THAN 70 milliGRAM(s)/deciliter  glucagon  Injectable 1 milliGRAM(s) IntraMuscular once PRN Glucose LESS THAN 70 milligrams/deciliter      PHYSICAL EXAM  Vital Signs Last 24 Hrs  T(C): 36.7 (05 Sep 2018 14:51), Max: 37.4 (04 Sep 2018 22:05)  T(F): 98 (05 Sep 2018 14:51), Max: 99.4 (04 Sep 2018 22:05)  HR: 54 (05 Sep 2018 11:37) (54 - 68)  BP: 127/60 (05 Sep 2018 11:37) (115/69 - 177/74)  BP(mean): --  RR: 16 (05 Sep 2018 11:37) (16 - 18)  SpO2: 97% (05 Sep 2018 11:37) (96% - 100%)    Constitutional: wn/wd in NAD.   HEENT: NCAT, MMM, OP clear, EOMI, no proptosis or lid retraction  Neck: no thyromegaly or palpable thyroid nodules   Respiratory: lungs CTAB.  Cardiovascular: regular rhythm, normal S1 and S2, no audible murmurs, no peripheral edema  GI: soft, NT/ND, no masses/HSM appreciated.  Neurology: no tremors, DTR 2+  Skin: no visible rashes/lesions  Psychiatric: AAO x 3, normal affect/mood.    LABS:                        13.9   6.6   )-----------( 196      ( 05 Sep 2018 06:38 )             42.1     09-05    137  |  100  |  14  ----------------------------<  157<H>  4.2   |  25  |  0.98    Ca    9.4      05 Sep 2018 06:38  Mg     2.0     09-05      PT/INR - ( 04 Sep 2018 06:24 )   PT: 15.3 sec;   INR: 1.37          PTT - ( 04 Sep 2018 06:24 )  PTT:89.2 sec    Thyroid Stimulating Hormone, Serum: 1.162 uIU/mL (09-02 @ 10:48)      HbA1C: 11.1 % (09-02 @ 10:48)    CAPILLARY BLOOD GLUCOSE    POCT Blood Glucose.: 212 mg/dL (05 Sep 2018 10:59)  POCT Blood Glucose.: 151 mg/dL (05 Sep 2018 06:50)  POCT Blood Glucose.: 169 mg/dL (04 Sep 2018 21:20)      Insulin Sliding Scale requirements X 24 Hours:    RADIOLOGY & ADDITIONAL TESTS:    A/P: 63y Female with history of DM type II presenting for       1.  DM -     Please continue           units lantus at bedtime  / in the morning and        units lispro with meals and lispro moderate / low dose sliding scale 4 times daily with meals and at bedtime.  Please continue consistent carbohydrate diet.      Goal FSG is   Will continue to monitor   For discharge, pt can continue    Pt can follow up at discharge with [facility] by calling  to make an appointment.   Will discuss case with     and update primary team INTERVAL HPI/OVERNIGHT EVENTS:    Patient had no overnight events. She is interested in being discharged home on insulin, hesitant to take other PO meds.     Pt reports the following symptoms:    CONSTITUTIONAL:  Negative fever or chills, feels well, good appetite  EYES:  Negative  blurry vision or double vision  CARDIOVASCULAR:  Negative for chest pain or palpitations  RESPIRATORY:  Negative for cough, wheezing, or SOB   GASTROINTESTINAL:  Negative for nausea, vomiting, diarrhea, constipation, or abdominal pain  GENITOURINARY:  Negative frequency, urgency or dysuria  NEUROLOGIC:  No headache, confusion, dizziness, lightheadedness    MEDICATIONS  (STANDING):  amLODIPine   Tablet 5 milliGRAM(s) Oral daily  aspirin enteric coated 81 milliGRAM(s) Oral daily  atorvastatin 80 milliGRAM(s) Oral at bedtime  clopidogrel Tablet 75 milliGRAM(s) Oral daily  dextrose 5%. 1000 milliLiter(s) (50 mL/Hr) IV Continuous <Continuous>  dextrose 50% Injectable 12.5 Gram(s) IV Push once  dextrose 50% Injectable 25 Gram(s) IV Push once  dextrose 50% Injectable 25 Gram(s) IV Push once  insulin glargine Injectable (LANTUS) 10 Unit(s) SubCutaneous at bedtime  insulin lispro (HumaLOG) corrective regimen sliding scale   SubCutaneous Before meals and at bedtime  insulin lispro Injectable (HumaLOG) 4 Unit(s) SubCutaneous three times a day before meals  losartan 25 milliGRAM(s) Oral daily  pantoprazole   Suspension 40 milliGRAM(s) Oral before breakfast  sodium chloride 0.9%. 500 milliLiter(s) (75 mL/Hr) IV Continuous <Continuous>    MEDICATIONS  (PRN):  acetaminophen   Tablet. 650 milliGRAM(s) Oral every 6 hours PRN Mild Pain (1 - 3)  dextrose 40% Gel 15 Gram(s) Oral once PRN Blood Glucose LESS THAN 70 milliGRAM(s)/deciliter  glucagon  Injectable 1 milliGRAM(s) IntraMuscular once PRN Glucose LESS THAN 70 milligrams/deciliter      PHYSICAL EXAM  Vital Signs Last 24 Hrs  T(C): 36.7 (05 Sep 2018 14:51), Max: 37.4 (04 Sep 2018 22:05)  T(F): 98 (05 Sep 2018 14:51), Max: 99.4 (04 Sep 2018 22:05)  HR: 54 (05 Sep 2018 11:37) (54 - 68)  BP: 127/60 (05 Sep 2018 11:37) (115/69 - 177/74)  BP(mean): --  RR: 16 (05 Sep 2018 11:37) (16 - 18)  SpO2: 97% (05 Sep 2018 11:37) (96% - 100%)    Constitutional: Obese female in NAD   HEENT: NCAT, hirustism present, coarse facial hair in chin area    Neck: small left sided mass palpated, soft, mobile   Respiratory: lungs CTA b/l   Cardiovascular: regular rhythm, normal S1 and S2, no audible murmurs  GI: soft, NT/ND BS+   Psychiatric: AAO x 3  Ext: no LE swelling b/l     LABS:                        13.9   6.6   )-----------( 196      ( 05 Sep 2018 06:38 )             42.1     09-05    137  |  100  |  14  ----------------------------<  157<H>  4.2   |  25  |  0.98    Ca    9.4      05 Sep 2018 06:38  Mg     2.0     09-05      PT/INR - ( 04 Sep 2018 06:24 )   PT: 15.3 sec;   INR: 1.37          PTT - ( 04 Sep 2018 06:24 )  PTT:89.2 sec    Thyroid Stimulating Hormone, Serum: 1.162 uIU/mL (09-02 @ 10:48)  HbA1C: 11.1 % (09-02 @ 10:48)    CAPILLARY BLOOD GLUCOSE    POCT Blood Glucose.: 212 mg/dL (05 Sep 2018 10:59)  POCT Blood Glucose.: 151 mg/dL (05 Sep 2018 06:50)  POCT Blood Glucose.: 169 mg/dL (04 Sep 2018 21:20)      Pt can follow up at discharge with [facility] by calling  to make an appointment.     D/c recs:   Please d/c patient on lantus 12 units hs and lispro 6 units TID.   Please have primary team teach patient how to inject insulin   Will restart metformin as outpatient   Will do pelvic USG, hormonal workup as outpatient    Case discussed with Dr. Correa, primary team updated.

## 2018-09-07 PROCEDURE — 80061 LIPID PANEL: CPT

## 2018-09-07 PROCEDURE — C1760: CPT

## 2018-09-07 PROCEDURE — 83735 ASSAY OF MAGNESIUM: CPT

## 2018-09-07 PROCEDURE — 71045 X-RAY EXAM CHEST 1 VIEW: CPT

## 2018-09-07 PROCEDURE — 84484 ASSAY OF TROPONIN QUANT: CPT

## 2018-09-07 PROCEDURE — 83036 HEMOGLOBIN GLYCOSYLATED A1C: CPT

## 2018-09-07 PROCEDURE — 82550 ASSAY OF CK (CPK): CPT

## 2018-09-07 PROCEDURE — 85025 COMPLETE CBC W/AUTO DIFF WBC: CPT

## 2018-09-07 PROCEDURE — 93005 ELECTROCARDIOGRAM TRACING: CPT | Mod: 76

## 2018-09-07 PROCEDURE — 85652 RBC SED RATE AUTOMATED: CPT

## 2018-09-07 PROCEDURE — 82962 GLUCOSE BLOOD TEST: CPT

## 2018-09-07 PROCEDURE — 83690 ASSAY OF LIPASE: CPT

## 2018-09-07 PROCEDURE — 84443 ASSAY THYROID STIM HORMONE: CPT

## 2018-09-07 PROCEDURE — 86140 C-REACTIVE PROTEIN: CPT

## 2018-09-07 PROCEDURE — 96376 TX/PRO/DX INJ SAME DRUG ADON: CPT

## 2018-09-07 PROCEDURE — 83880 ASSAY OF NATRIURETIC PEPTIDE: CPT

## 2018-09-07 PROCEDURE — 93306 TTE W/DOPPLER COMPLETE: CPT

## 2018-09-07 PROCEDURE — 85730 THROMBOPLASTIN TIME PARTIAL: CPT

## 2018-09-07 PROCEDURE — 97161 PT EVAL LOW COMPLEX 20 MIN: CPT

## 2018-09-07 PROCEDURE — C1769: CPT

## 2018-09-07 PROCEDURE — C1889: CPT

## 2018-09-07 PROCEDURE — C1725: CPT

## 2018-09-07 PROCEDURE — 96375 TX/PRO/DX INJ NEW DRUG ADDON: CPT

## 2018-09-07 PROCEDURE — C1887: CPT

## 2018-09-07 PROCEDURE — 99285 EMERGENCY DEPT VISIT HI MDM: CPT | Mod: 25

## 2018-09-07 PROCEDURE — 85027 COMPLETE CBC AUTOMATED: CPT

## 2018-09-07 PROCEDURE — 94660 CPAP INITIATION&MGMT: CPT

## 2018-09-07 PROCEDURE — 80053 COMPREHEN METABOLIC PANEL: CPT

## 2018-09-07 PROCEDURE — C1894: CPT

## 2018-09-07 PROCEDURE — C1874: CPT

## 2018-09-07 PROCEDURE — 80048 BASIC METABOLIC PNL TOTAL CA: CPT

## 2018-09-07 PROCEDURE — 85610 PROTHROMBIN TIME: CPT

## 2018-09-07 PROCEDURE — 36415 COLL VENOUS BLD VENIPUNCTURE: CPT

## 2018-09-07 PROCEDURE — 96374 THER/PROPH/DIAG INJ IV PUSH: CPT

## 2018-09-07 PROCEDURE — 82553 CREATINE MB FRACTION: CPT

## 2018-09-12 DIAGNOSIS — K21.9 GASTRO-ESOPHAGEAL REFLUX DISEASE WITHOUT ESOPHAGITIS: ICD-10-CM

## 2018-09-12 DIAGNOSIS — Z91.14 PATIENT'S OTHER NONCOMPLIANCE WITH MEDICATION REGIMEN: ICD-10-CM

## 2018-09-12 DIAGNOSIS — E78.5 HYPERLIPIDEMIA, UNSPECIFIED: ICD-10-CM

## 2018-09-12 DIAGNOSIS — E11.21 TYPE 2 DIABETES MELLITUS WITH DIABETIC NEPHROPATHY: ICD-10-CM

## 2018-09-12 DIAGNOSIS — Z91.11 PATIENT'S NONCOMPLIANCE WITH DIETARY REGIMEN: ICD-10-CM

## 2018-09-12 DIAGNOSIS — I25.110 ATHEROSCLEROTIC HEART DISEASE OF NATIVE CORONARY ARTERY WITH UNSTABLE ANGINA PECTORIS: ICD-10-CM

## 2018-09-12 DIAGNOSIS — I10 ESSENTIAL (PRIMARY) HYPERTENSION: ICD-10-CM

## 2018-09-12 DIAGNOSIS — E11.65 TYPE 2 DIABETES MELLITUS WITH HYPERGLYCEMIA: ICD-10-CM

## 2018-09-12 DIAGNOSIS — I21.4 NON-ST ELEVATION (NSTEMI) MYOCARDIAL INFARCTION: ICD-10-CM

## 2018-09-12 DIAGNOSIS — G47.33 OBSTRUCTIVE SLEEP APNEA (ADULT) (PEDIATRIC): ICD-10-CM

## 2018-09-12 DIAGNOSIS — L68.0 HIRSUTISM: ICD-10-CM

## 2018-09-12 DIAGNOSIS — E66.01 MORBID (SEVERE) OBESITY DUE TO EXCESS CALORIES: ICD-10-CM

## 2018-09-12 DIAGNOSIS — M19.90 UNSPECIFIED OSTEOARTHRITIS, UNSPECIFIED SITE: ICD-10-CM

## 2018-09-12 DIAGNOSIS — Z87.442 PERSONAL HISTORY OF URINARY CALCULI: ICD-10-CM

## 2018-09-12 DIAGNOSIS — F17.210 NICOTINE DEPENDENCE, CIGARETTES, UNCOMPLICATED: ICD-10-CM

## 2018-09-12 DIAGNOSIS — Z99.89 DEPENDENCE ON OTHER ENABLING MACHINES AND DEVICES: ICD-10-CM

## 2018-09-12 DIAGNOSIS — Z79.4 LONG TERM (CURRENT) USE OF INSULIN: ICD-10-CM

## 2018-09-20 ENCOUNTER — APPOINTMENT (OUTPATIENT)
Dept: HEART AND VASCULAR | Facility: CLINIC | Age: 63
End: 2018-09-20
Payer: MEDICARE

## 2018-09-20 VITALS
SYSTOLIC BLOOD PRESSURE: 112 MMHG | DIASTOLIC BLOOD PRESSURE: 78 MMHG | BODY MASS INDEX: 40.49 KG/M2 | HEART RATE: 61 BPM | HEIGHT: 67 IN | WEIGHT: 258 LBS

## 2018-09-20 DIAGNOSIS — J45.909 UNSPECIFIED ASTHMA, UNCOMPLICATED: ICD-10-CM

## 2018-09-20 DIAGNOSIS — K21.9 GASTRO-ESOPHAGEAL REFLUX DISEASE W/OUT ESOPHAGITIS: ICD-10-CM

## 2018-09-20 DIAGNOSIS — Z95.5 PRESENCE OF CORONARY ANGIOPLASTY IMPLANT AND GRAFT: ICD-10-CM

## 2018-09-20 DIAGNOSIS — G47.33 OBSTRUCTIVE SLEEP APNEA (ADULT) (PEDIATRIC): ICD-10-CM

## 2018-09-20 PROCEDURE — 93000 ELECTROCARDIOGRAM COMPLETE: CPT

## 2018-09-20 PROCEDURE — 99214 OFFICE O/P EST MOD 30 MIN: CPT

## 2018-09-20 RX ORDER — NITROGLYCERIN 0.4 MG/1
0.4 TABLET SUBLINGUAL
Qty: 15 | Refills: 0 | Status: ACTIVE | COMMUNITY
Start: 2018-09-20 | End: 1900-01-01

## 2018-09-24 LAB
APPEARANCE: CLEAR
BACTERIA: NEGATIVE
BILIRUBIN URINE: NEGATIVE
BLOOD URINE: NEGATIVE
COLOR: YELLOW
CREAT SPEC-SCNC: 106 MG/DL
GLUCOSE QUALITATIVE U: 100 MG/DL
HYALINE CASTS: 1 /LPF
KETONES URINE: NEGATIVE
LEUKOCYTE ESTERASE URINE: NEGATIVE
MICROALBUMIN 24H UR DL<=1MG/L-MCNC: 9 MG/DL
MICROALBUMIN/CREAT 24H UR-RTO: 85 MG/G
MICROSCOPIC-UA: NORMAL
NITRITE URINE: NEGATIVE
PH URINE: 5.5
PROTEIN URINE: 30 MG/DL
RED BLOOD CELLS URINE: 10 /HPF
SPECIFIC GRAVITY URINE: 1.02
SQUAMOUS EPITHELIAL CELLS: 2 /HPF
UROBILINOGEN URINE: NEGATIVE MG/DL
WHITE BLOOD CELLS URINE: 1 /HPF

## 2018-11-19 ENCOUNTER — APPOINTMENT (OUTPATIENT)
Dept: INTERNAL MEDICINE | Facility: CLINIC | Age: 63
End: 2018-11-19

## 2018-11-20 ENCOUNTER — RX RENEWAL (OUTPATIENT)
Age: 63
End: 2018-11-20

## 2018-11-20 ENCOUNTER — APPOINTMENT (OUTPATIENT)
Dept: INTERNAL MEDICINE | Facility: CLINIC | Age: 63
End: 2018-11-20
Payer: MEDICARE

## 2018-11-20 VITALS
SYSTOLIC BLOOD PRESSURE: 144 MMHG | TEMPERATURE: 98.8 F | OXYGEN SATURATION: 96 % | DIASTOLIC BLOOD PRESSURE: 80 MMHG | WEIGHT: 255 LBS | HEIGHT: 67 IN | BODY MASS INDEX: 40.02 KG/M2 | HEART RATE: 69 BPM

## 2018-11-20 VITALS — DIASTOLIC BLOOD PRESSURE: 81 MMHG | SYSTOLIC BLOOD PRESSURE: 122 MMHG

## 2018-11-20 DIAGNOSIS — M19.90 UNSPECIFIED OSTEOARTHRITIS, UNSPECIFIED SITE: ICD-10-CM

## 2018-11-20 DIAGNOSIS — Z11.59 ENCOUNTER FOR SCREENING FOR OTHER VIRAL DISEASES: ICD-10-CM

## 2018-11-20 PROCEDURE — 99204 OFFICE O/P NEW MOD 45 MIN: CPT | Mod: GC,25

## 2018-11-20 PROCEDURE — 36415 COLL VENOUS BLD VENIPUNCTURE: CPT

## 2018-11-20 RX ORDER — RANITIDINE 150 MG/1
150 TABLET ORAL
Qty: 90 | Refills: 3 | Status: ACTIVE | COMMUNITY
Start: 2018-11-20 | End: 1900-01-01

## 2018-11-20 RX ORDER — METFORMIN HYDROCHLORIDE 500 MG/1
500 TABLET, COATED ORAL
Qty: 60 | Refills: 3 | Status: ACTIVE | COMMUNITY
Start: 1900-01-01 | End: 1900-01-01

## 2019-01-02 ENCOUNTER — APPOINTMENT (OUTPATIENT)
Dept: OPHTHALMOLOGY | Facility: CLINIC | Age: 64
End: 2019-01-02
Payer: MEDICARE

## 2019-01-02 DIAGNOSIS — H25.12 AGE-RELATED NUCLEAR CATARACT, LEFT EYE: ICD-10-CM

## 2019-01-02 DIAGNOSIS — H18.603 KERATOCONUS, UNSPECIFIED, BILATERAL: ICD-10-CM

## 2019-01-02 PROCEDURE — 92025 CPTRIZED CORNEAL TOPOGRAPHY: CPT

## 2019-01-02 PROCEDURE — 92004 COMPRE OPH EXAM NEW PT 1/>: CPT

## 2019-01-14 ENCOUNTER — RX RENEWAL (OUTPATIENT)
Age: 64
End: 2019-01-14

## 2019-01-24 ENCOUNTER — APPOINTMENT (OUTPATIENT)
Dept: HEART AND VASCULAR | Facility: CLINIC | Age: 64
End: 2019-01-24
Payer: MEDICARE

## 2019-01-24 VITALS
HEIGHT: 67 IN | SYSTOLIC BLOOD PRESSURE: 118 MMHG | BODY MASS INDEX: 41.59 KG/M2 | WEIGHT: 265 LBS | DIASTOLIC BLOOD PRESSURE: 76 MMHG | HEART RATE: 58 BPM

## 2019-01-24 PROCEDURE — 99213 OFFICE O/P EST LOW 20 MIN: CPT | Mod: 25

## 2019-01-24 PROCEDURE — 36415 COLL VENOUS BLD VENIPUNCTURE: CPT

## 2019-01-24 NOTE — DISCUSSION/SUMMARY
[Patient] : the patient [___ Month(s)] : [unfilled] month(s) [FreeTextEntry1] : 63 year old female smoker w h/o sherry, hl, htn, dm, gerd, obesity, asthma, cad s/p DERIK mRCA, microalbuminuria presents for f/up today \par \par -ekg 9/18 - nsr, normal intervals, no st/t changes\par -cardiac catheterization 9/4/18 with PTCA/DERIK mRCA 99% lesion, pLAD 50%, pLCx 30-50%, EF 55% EDP 14\par -labs 9/18 reviewed\par -monitor microalbuminuria\par -BB on hold 2/2 madelyn\par -TTE 9/18 - ef 65%, no wma, trace mr/tr\par -continue arb, ccb, asa, statin, plavix\par -endocrine recs for DM management\par -counseled on cvd rf\par -smoking cessation\par -cpap for sherry\par -ordered lipids\par -f/up 6 months\par

## 2019-01-24 NOTE — HISTORY OF PRESENT ILLNESS
[FreeTextEntry1] : 63 year old female smoker w h/o sherry, hl, htn, dm, micoalbuminuria, obesity, asthma, gerd, cad s/p DERIK mRCA presents for f/up. \par \par Last seen  \par Urine microalbumin showed microalbuminuria\par Admit to [facility] with cp/nstemi. Troponin trended up with peak 0.22 and had cardiac catheterization 18 w/ Dr. Schroeder. Pt is now s/p cardiac catheterization 18 with PTCA/DERIK mRCA 99% lesion, pLAD 50%, pLCx 30-50%, EF 55% EDP 14, right groin angioseal.Discharged off Beta blocker 2/2 bradycardia. Endocrine recs during hospitalization for DM to discharge with metformin 500 mg BID, Lantus 12 units at bedtime, and lispro 6 units TID before meals. \par \par No cp, sob, lh, edema, syncope, palptiations\par \par Cath \par 1) Two vessel CAD (90% mRCA, 50% pLAD)\par 2) Normal LV function with inferior hypokinesis EF 55%, LVEDP 14\par 3) Successful DERIK of mRCA with 3.5x15mm Resolute stent\par \par DM and HTN diagnosed in 30's\par \par PMH/PSH:\par gerd\par htn\par cad s/p DERIK mRCA Resolute \par c section\par sherry\par dm\par hl\par proteinuria\par nstemi\par asthma\par arthritis\par vit D deficiency\par kidney stones\par obesity\par \par ALL:\par celebrex - sob/dizziness\par \par FH:\par NC\par \par SH:\par smoker 50 pack year history - 3 cigs/day\par  but \par live w friend\par 4 children - healthy\par no etoh\par no ivda\par was home attendant\par from [place]\par \par \par MEDS:\par asa 81 mg qd\par lipitor 80 mg qhs\par losartan 25 mg qd\par lantus\par metformin 500 mg bid\par humalog\par plavix 75 mg qd\par norvasc 5 mg qd\par \par \par \par \par EXAM: ECHOCARDIOGRAM (CARDIOL)  \par \par PROCEDURE DATE: 2018 \par \par \par \par INTERPRETATION: Patient Height: 170.0 cm\par Patient Weight: 118.0 kg\par BSA: 2.3 m^2\par Interpretation Summary\par The left atrial size is normal. The left atrial volume index is 27.5 cc/m2\par  (normal <34cc/m2). There is mild concentric left ventricular \par hypertrophy.The\par  left ventricular wall motion is normal. The left ventricular ejection \par fraction\par  is 65%. Right atrial size is normal.The right ventricle is normal in \par size and\par  function.There was insufficient TR detected from which to calculate \par pulmonary\par  artery systolic pressure. The IVC is dilated (>2.1 cm) with an abnormal\par  inspiratory collapse (<50%) consistent with elevated right atrial \par pressure.\par  No aortic root dilatation.Normal size aortic arch with no hemodynamic \par evidence\par  for coarctation.There is no pericardial effusion.\par Procedure Details\par A complete two-dimensional transthoracic echocardiogram was performed (2D,\par M-mode, spectral and color flow doppler).\par Study Quality: Good.\par Left Ventricle\par There is mild concentric left ventricular hypertrophy.\par The left ventricular wall motion is normal.\par The left ventricular ejection fraction is 65%.\par Left Atrium\par The left atrial volume index is 27.5 cc/m2 (normal <34cc/m2)\par The left atrial size is normal.\par Right Atrium\par Right atrial size is normal.\par Right Ventricle\par The right ventricle is normal in size and function.\par Aortic Valve\par The aortic valve is trileaflet.\par No aortic regurgitation noted.\par Mitral Valve\par Structurally normal mitral valve.\par There is trace mitral regurgitation.\par Tricuspid Valve\par Structurally normal tricuspid valve.\par There is trace tricuspid regurgitation.\par There was insufficient TR detected from which to calculate pulmonary \par artery\par systolic pressure.\par Pulmonic Valve\par Structurally normal pulmonic valve.\par No pulmonic regurgitation noted.\par Arteries and Venous System\par No aortic root dilatation.\par Normal size aortic arch with no hemodynamic evidence for coarctation\par The IVC is dilated (>2.1 cm) with an abnormal inspiratory collapse (<50%)\par consistent with elevated right atrial pressure.\par Pericardium / Pleura\par There is no pericardial effusion.\par Additional Comments\par No evidence for any hemodynamically significant valvular disease.\par Doppler Measurements & Calculations\par MV E point: 60.3 cm/sec\par MV A point: 67.8 cm/sec\par MV E/A: 0.9\par MV dec slope: 215.2 cm/sec^2\par Ao V2 max: 122.7 cm/sec\par Ao max P.0 mmHg\par Ao max PG (full): 2.0 mmHg\par ALEJA(V,A): 3.2 cm^2\par ALEJA(V,D): 3.2 cm^2\par LV max P mmHg\par LV mean P.1 mmHg\par LV V1 max: 100 cm/sec\par LV V1 mean: 65.7 cm/sec\par LV V1 VTI: 24.5 cm\par SV(LVOT): 97.1 ml\par SI(LVOT): 43.0 ml/m^2\par MMode 2D Measurements & Calculations\par IVSd: 1.4 cm\par LVIDd: 4.8 cm\par LVIDs: 3.4 cm\par LVPWd: 1.1 cm\par IVS/LVPW: 1.3\par FS: 28.6 %\par EDV(Teich): 106.1 ml\par ESV(Teich): 47.6 ml\par LV mass(C)d: 219.8 grams\par LV mass(C)dI: 97.3 grams/m^2\par SI(cubed): 30.7 ml/m^2\par Ao root diam: 4.1 cm\par Ao root area: 13.1 cm^2\par LA dimension: 2.6 cm\par LA/Ao: 0.6\par LVOT diam: 2.2 cm\par LVOT area: 4.0 cm^2\par LVOT area (M): 4.0 cm^2\par EDV(MOD-sp4): 118 ml\par EDV(MOD-sp2): 107 ml\par Interpreting Physician:Fred Shin electronically signed on 2018 \par 15:47:43\par \par \par \par \par \par \par \par "Thank you for the opportunity to participate in the care of this \par patient."\par \par \par \par FRED SHIN \par This document has been electronically signed. Sep 3 2018 3:47PM\par  \par

## 2019-01-28 LAB
CHOLEST SERPL-MCNC: 179 MG/DL
CHOLEST/HDLC SERPL: 3.7 RATIO
HDLC SERPL-MCNC: 49 MG/DL
LDLC SERPL CALC-MCNC: 107 MG/DL
TRIGL SERPL-MCNC: 113 MG/DL

## 2019-02-01 ENCOUNTER — APPOINTMENT (OUTPATIENT)
Dept: ENDOCRINOLOGY | Facility: CLINIC | Age: 64
End: 2019-02-01
Payer: MEDICARE

## 2019-02-01 VITALS
HEART RATE: 63 BPM | BODY MASS INDEX: 40.97 KG/M2 | HEIGHT: 67 IN | SYSTOLIC BLOOD PRESSURE: 128 MMHG | WEIGHT: 261 LBS | DIASTOLIC BLOOD PRESSURE: 80 MMHG

## 2019-02-01 DIAGNOSIS — Z87.442 PERSONAL HISTORY OF URINARY CALCULI: ICD-10-CM

## 2019-02-01 DIAGNOSIS — F17.200 NICOTINE DEPENDENCE, UNSPECIFIED, UNCOMPLICATED: ICD-10-CM

## 2019-02-01 LAB
GLUCOSE BLDC GLUCOMTR-MCNC: 120
HBA1C MFR BLD HPLC: 7.9

## 2019-02-01 PROCEDURE — 99205 OFFICE O/P NEW HI 60 MIN: CPT | Mod: 25

## 2019-02-01 PROCEDURE — 99406 BEHAV CHNG SMOKING 3-10 MIN: CPT

## 2019-02-01 PROCEDURE — 36415 COLL VENOUS BLD VENIPUNCTURE: CPT

## 2019-02-01 PROCEDURE — 82962 GLUCOSE BLOOD TEST: CPT

## 2019-02-01 PROCEDURE — 83036 HEMOGLOBIN GLYCOSYLATED A1C: CPT | Mod: QW

## 2019-02-01 PROCEDURE — 99215 OFFICE O/P EST HI 40 MIN: CPT | Mod: 25

## 2019-02-01 RX ORDER — BLOOD-GLUCOSE METER
70 EACH MISCELLANEOUS
Qty: 4 | Refills: 3 | Status: ACTIVE | COMMUNITY
Start: 2019-02-01 | End: 1900-01-01

## 2019-02-01 NOTE — HISTORY OF PRESENT ILLNESS
[FreeTextEntry1] : 63 year old female smoker w h/o sherry, hl, htn, dm, micoalbuminuria, obesity, asthma, gerd, cad s/p DERIK mRCA presents for initial metabolic evaluation.\par Generally feels well and endorses no acute complaints.\par Reports DM2 diagnosis was made many years ago by former PCP. She notes however that she was started on insulin after PCI in 9/2018. At that time, A1C was 11. She has been adherent to Lantus 12 units QHS but self d/lynne humalog 8 units. She is compliant w/ Metformin 500 mg BID, denies GI s/e.\par She otherwise denies any f/c, CP, SOB, palpitations, tremors, depressed mood, anxiety, palpitations, n/v, stool/urinary abn, skin/weight changes, heat/cold intolerance, HAs, breast/nipple changes, polyuria/polydipsia/nocturia or other complaints.\par Off note, she is an active smoker w/ >50 ppy, currently smokes 4 cigs daily.

## 2019-02-01 NOTE — ASSESSMENT
[FreeTextEntry1] : 1) DM2: Uncontrolled, A1C on 1/2019 at 7.9%. target of <7%. Natural hx of the disease and importance of treatment targets discussed at length, she verbalized understanding. ADA diet and importance of exercise discussed at length. Plan is to increase metformin to full dose and introduce GLP-1 agonist in the future. Reduce Lantus to 8 units QHS, titration instructions provided, continue to hold bolus insulin. Refer to Nutritionist today. We gaudencio check microalbumin, lipids and labs on the NV. Discuss vaccines and podiatry/opthalmology referrals on NV \par \par 2) Weight gain:  complicated by DM2. Discussed medical  strategies. Pt would like to try lifestyle modifications and GLP-1 agonist therapy in the future. Reassess on the NV for at least ~5% TBW loss.\par \par 3) Essential HTN: Pt is at goal BP and on an Arb. Reassess microalbumin prior to the NV.\par  \par 4) Dyslipidemia: Pt is  on a moderate intensity statin. Atorvastatin 80 mg QDaily. REassess lipids on the next visit. LDL target <100.\par  [Carbohydrate Consistent Diet] : carbohydrate consistent diet [Hypoglycemia Management] : hypoglycemia management [Diabetes Foot Care] : diabetes foot care [Long Term Vascular Complications] : long term vascular complications of diabetes [Importance of Diet and Exercise] : importance of diet and exercise to improve glycemic control, achieve weight loss and improve cardiovascular health [Smoking Cessation] : smoking cessation [Incretin Mimetic Therapy] : Risks and benefits of incretin mimetic therapy were discussed with the patient including nauseau, pancreatitis and potential risk of medullary thyroid cancer [FreeTextEntry2] : 15 min independent obesity counseling provided

## 2019-02-08 LAB
25(OH)D3 SERPL-MCNC: 30.9 NG/ML
ALBUMIN SERPL ELPH-MCNC: 4.3 G/DL
ALP BLD-CCNC: 154 U/L
ALT SERPL-CCNC: 42 U/L
ANION GAP SERPL CALC-SCNC: 12 MMOL/L
AST SERPL-CCNC: 29 U/L
BILIRUB SERPL-MCNC: 0.4 MG/DL
BUN SERPL-MCNC: 21 MG/DL
C PEPTIDE SERPL-MCNC: 1.7 NG/ML
CALCIUM SERPL-MCNC: 9.9 MG/DL
CALCIUM SERPL-MCNC: 9.9 MG/DL
CHLORIDE SERPL-SCNC: 107 MMOL/L
CO2 SERPL-SCNC: 26 MMOL/L
CREAT SERPL-MCNC: 0.94 MG/DL
FOLATE SERPL-MCNC: 9.1 NG/ML
GLUCOSE SERPL-MCNC: 69 MG/DL
PARATHYROID HORMONE INTACT: 47 PG/ML
POTASSIUM SERPL-SCNC: 4.3 MMOL/L
PROT SERPL-MCNC: 7.3 G/DL
SODIUM SERPL-SCNC: 145 MMOL/L
VIT B12 SERPL-MCNC: 761 PG/ML

## 2019-02-25 ENCOUNTER — FORM ENCOUNTER (OUTPATIENT)
Age: 64
End: 2019-02-25

## 2019-02-26 ENCOUNTER — APPOINTMENT (OUTPATIENT)
Dept: ORTHOPEDIC SURGERY | Facility: CLINIC | Age: 64
End: 2019-02-26
Payer: MEDICARE

## 2019-02-26 ENCOUNTER — OUTPATIENT (OUTPATIENT)
Dept: OUTPATIENT SERVICES | Facility: HOSPITAL | Age: 64
LOS: 1 days | End: 2019-02-26
Payer: MEDICARE

## 2019-02-26 VITALS
HEART RATE: 57 BPM | DIASTOLIC BLOOD PRESSURE: 80 MMHG | WEIGHT: 260 LBS | BODY MASS INDEX: 40.81 KG/M2 | OXYGEN SATURATION: 97 % | HEIGHT: 67 IN | SYSTOLIC BLOOD PRESSURE: 130 MMHG

## 2019-02-26 DIAGNOSIS — Z98.891 HISTORY OF UTERINE SCAR FROM PREVIOUS SURGERY: Chronic | ICD-10-CM

## 2019-02-26 DIAGNOSIS — M25.552 PAIN IN LEFT HIP: ICD-10-CM

## 2019-02-26 PROCEDURE — 99204 OFFICE O/P NEW MOD 45 MIN: CPT

## 2019-02-26 PROCEDURE — 73521 X-RAY EXAM HIPS BI 2 VIEWS: CPT | Mod: 26

## 2019-02-26 PROCEDURE — 72020 X-RAY EXAM OF SPINE 1 VIEW: CPT | Mod: 26

## 2019-02-26 PROCEDURE — 73521 X-RAY EXAM HIPS BI 2 VIEWS: CPT

## 2019-02-26 PROCEDURE — 73564 X-RAY EXAM KNEE 4 OR MORE: CPT

## 2019-02-26 PROCEDURE — 72020 X-RAY EXAM OF SPINE 1 VIEW: CPT

## 2019-02-26 PROCEDURE — 73564 X-RAY EXAM KNEE 4 OR MORE: CPT | Mod: 26,50

## 2019-02-26 NOTE — END OF VISIT
[FreeTextEntry3] : All medical record entries made by the Scribe were at my, Dr. Lopez's, discretion and personally dictated by me on 02/26/2019. I have reviewed the chart and agree that the record accurately reflects my personal performance of the history, physical exam, assessment and plan. I have also personally directed, reviewed and agreed to the chart.

## 2019-02-26 NOTE — ADDENDUM
[FreeTextEntry1] : This note was written by Buffy Lala on 02/26/2019  acting as scribe for Dr. Lopez and SESAR Bazzi.\par

## 2019-02-26 NOTE — HISTORY OF PRESENT ILLNESS
[de-identified] : 62 y/o F with DM, HTN, HLD and PMHx of MI presents today for initial evaluation of left hip area pain. She reports severe pain in the left hip and groin area as well as burning down the thigh. The pain severely limits the patient's activity and she reports that she even has trouble using the restroom. She can walk less than 5 blocks with a severe limp. Patient uses a cane for longer walks but presents today in a wheelchair. She is unable to use stairs and cannot sit comfortably in any chair. Acetaminophen helps a little to relieve pain whereas cane use, ibuprofen and naproxen do not. She has done physical therapy in the past but she believes it made her pain worse. \par

## 2019-02-26 NOTE — PHYSICAL EXAM
[de-identified] : Constitutional: Well appearing. No acute distress.\par Mental Status: Alert & oriented to person, place and time. Normal affect.\par Pulmonary: No respiratory distress. Normal chest excursion.\par \par Gait: Normal.\par Ambulatory assist devices: None.\par \par Cervical spine: Skin intact. No visible deformity. Painless active ROM without evident restriction.\par Bilateral upper extremities: Skin intact. No deformity. Painless active ROM without evident restriction.\par Thoracolumbar spine: No deformity. No tenderness. No radicular pain on passive straight leg raise bilaterally.\par Pelvis: No pelvic obliquity. No tenderness.\par Leg lengths: Equal.\par \par Right Hip:\par Skin intact.\par No surgical scars.\par No erythema.\par No ecchymosis.\par No swelling.\par No deformity.\par No focal tenderness.\par Painless and unrestricted range of motion. \par No crepitation.\par No instability.\par MARNI painless.\par Impingement (FADIR) painless.\par Stinchfield painless.\par Abductor power 5/5.\par Flexor power 5/5.\par \par Left Hip:\par Skin intact.\par No surgical scars.\par No erythema.\par No ecchymosis.\par No swelling.\par No deformity.\par No focal tenderness.\par Severely painful ROM from full extension to 70 degrees of flexion. 15 degrees of internal rotation. 15-20 degrees of external rotation. 20 degrees of abduction. 10 degrees of adduction.\par No crepitation.\par No instability.\par MARNI painful.\par Impingement (FADIR) painful.\par Stinchfield painful.\par Abductor power 5/5.\par Flexor power 3/5.\par \par Neurological: Intact distal crude touch sensation. Normal distal motor power.\par Cardiovascular: Palpable dorsalis pedis and posterior tibialis pulses. Brisk capillary refill. No peripheral edema.\par Lymphatics: No peripheral adenopathy appreciated. [de-identified] : X-ray imaging of the bilateral hips done here today demonstrates minimal degenerative changes without acute fracture or dislocation.

## 2019-02-26 NOTE — REASON FOR VISIT
[Initial Consultation] : an initial consultation for [Hip Pain] : hip pain [FreeTextEntry2] : Left hip.

## 2019-02-26 NOTE — DISCUSSION/SUMMARY
[de-identified] : 64 y/o F presents today with severe left hip area pain. Based on X-ray imaging, I cannot determine where the patient's pain is coming from and cannot recommend surgery at this time. I ordered a closed MRI to determine if there's a structural problem that is not showing up on X-ray. If the MRI does not show any structural or mechanical problems, I will refer the patient to a rheumatologist to see if inflammation is the cause of her pain.\par Follow up after MRI.

## 2019-03-08 ENCOUNTER — APPOINTMENT (OUTPATIENT)
Dept: INTERNAL MEDICINE | Facility: CLINIC | Age: 64
End: 2019-03-08

## 2019-03-18 ENCOUNTER — APPOINTMENT (OUTPATIENT)
Dept: MRI IMAGING | Facility: HOSPITAL | Age: 64
End: 2019-03-18
Payer: MEDICARE

## 2019-03-18 ENCOUNTER — OUTPATIENT (OUTPATIENT)
Dept: OUTPATIENT SERVICES | Facility: HOSPITAL | Age: 64
LOS: 1 days | End: 2019-03-18
Payer: MEDICARE

## 2019-03-18 DIAGNOSIS — Z98.891 HISTORY OF UTERINE SCAR FROM PREVIOUS SURGERY: Chronic | ICD-10-CM

## 2019-03-18 PROCEDURE — 73721 MRI JNT OF LWR EXTRE W/O DYE: CPT | Mod: 26,LT

## 2019-03-18 PROCEDURE — 73721 MRI JNT OF LWR EXTRE W/O DYE: CPT

## 2019-03-21 ENCOUNTER — APPOINTMENT (OUTPATIENT)
Dept: ENDOCRINOLOGY | Facility: CLINIC | Age: 64
End: 2019-03-21
Payer: MEDICARE

## 2019-03-21 VITALS
HEART RATE: 65 BPM | SYSTOLIC BLOOD PRESSURE: 174 MMHG | WEIGHT: 267 LBS | DIASTOLIC BLOOD PRESSURE: 46 MMHG | BODY MASS INDEX: 41.91 KG/M2 | HEIGHT: 67 IN

## 2019-03-21 LAB — GLUCOSE BLDC GLUCOMTR-MCNC: 168

## 2019-03-21 PROCEDURE — 99214 OFFICE O/P EST MOD 30 MIN: CPT | Mod: 25

## 2019-03-21 PROCEDURE — 99406 BEHAV CHNG SMOKING 3-10 MIN: CPT

## 2019-03-21 PROCEDURE — 82962 GLUCOSE BLOOD TEST: CPT

## 2019-03-21 RX ORDER — AMLODIPINE BESYLATE 5 MG/1
5 TABLET ORAL DAILY
Qty: 30 | Refills: 5 | Status: COMPLETED | COMMUNITY
End: 2019-03-21

## 2019-03-22 NOTE — ASSESSMENT
[FreeTextEntry1] : 1) DM2: Uncontrolled, A1C on 1/2019 at 7.9%. target of <7%. Natural hx of the disease and importance of treatment targets discussed at length, she verbalized understanding. ADA diet and importance of exercise discussed at length. Plan is to increase metformin to full dose and introduce GLP-1 agonist in the future. Reduce Lantus to 8 units QHS, titration instructions provided, continue to hold bolus insulin. Refer to Nutritionist today. We gaudencio check microalbumin, lipids and labs on the NV. Discuss vaccines and podiatry/opthalmology referrals on NV \par \par 2) Weight gain:  complicated by DM2. Discussed medical  strategies. Pt would like to try lifestyle modifications and GLP-1 agonist therapy in the future. Reassess on the NV for at least ~5% TBW loss.\par \par 3) Essential HTN: Pt is at goal BP and had self d/lynne Arb. reports HAs w/ CCB, advised to restart losartan and eGFR is normal. Refill sent. Reassess microalbumin prior to the NV.\par  \par 4) Dyslipidemia: Pt is not on a moderate intensity statin. stopped Atorvastatin 80 mg QDaily as she felt it gave her loose stools. r/b/a and s/e reviewed. declines dose reduction at this time. REassess lipids on the next visit. LDL target <100.\par  [Carbohydrate Consistent Diet] : carbohydrate consistent diet [Hypoglycemia Management] : hypoglycemia management [Diabetes Foot Care] : diabetes foot care [Long Term Vascular Complications] : long term vascular complications of diabetes [Importance of Diet and Exercise] : importance of diet and exercise to improve glycemic control, achieve weight loss and improve cardiovascular health [Smoking Cessation] : smoking cessation [Incretin Mimetic Therapy] : Risks and benefits of incretin mimetic therapy were discussed with the patient including nauseau, pancreatitis and potential risk of medullary thyroid cancer [FreeTextEntry2] : 15 min independent obesity counseling provided

## 2019-03-25 ENCOUNTER — APPOINTMENT (OUTPATIENT)
Dept: INTERNAL MEDICINE | Facility: CLINIC | Age: 64
End: 2019-03-25

## 2019-04-08 ENCOUNTER — APPOINTMENT (OUTPATIENT)
Dept: INTERNAL MEDICINE | Facility: CLINIC | Age: 64
End: 2019-04-08
Payer: MEDICARE

## 2019-04-08 ENCOUNTER — RX RENEWAL (OUTPATIENT)
Age: 64
End: 2019-04-08

## 2019-04-08 VITALS
BODY MASS INDEX: 41.91 KG/M2 | OXYGEN SATURATION: 96 % | SYSTOLIC BLOOD PRESSURE: 169 MMHG | TEMPERATURE: 98.3 F | WEIGHT: 267 LBS | DIASTOLIC BLOOD PRESSURE: 81 MMHG | HEIGHT: 67 IN | HEART RATE: 64 BPM

## 2019-04-08 DIAGNOSIS — Z12.31 ENCOUNTER FOR SCREENING MAMMOGRAM FOR MALIGNANT NEOPLASM OF BREAST: ICD-10-CM

## 2019-04-08 PROCEDURE — 99214 OFFICE O/P EST MOD 30 MIN: CPT | Mod: GC

## 2019-04-25 ENCOUNTER — APPOINTMENT (OUTPATIENT)
Dept: ORTHOPEDIC SURGERY | Facility: CLINIC | Age: 64
End: 2019-04-25
Payer: MEDICARE

## 2019-04-25 DIAGNOSIS — M53.86 OTHER SPECIFIED DORSOPATHIES, LUMBAR REGION: ICD-10-CM

## 2019-04-25 DIAGNOSIS — M70.62 TROCHANTERIC BURSITIS, LEFT HIP: ICD-10-CM

## 2019-04-25 DIAGNOSIS — M16.9 OSTEOARTHRITIS OF HIP, UNSPECIFIED: ICD-10-CM

## 2019-04-25 DIAGNOSIS — M76.02 GLUTEAL TENDINITIS, LEFT HIP: ICD-10-CM

## 2019-04-25 PROCEDURE — 20610 DRAIN/INJ JOINT/BURSA W/O US: CPT | Mod: LT

## 2019-04-25 PROCEDURE — 99213 OFFICE O/P EST LOW 20 MIN: CPT | Mod: 25

## 2019-04-25 NOTE — END OF VISIT
[FreeTextEntry3] : All medical record entries made by the Scribe were at my, Dr. Lopez's, discretion and personally dictated by me on 04/25/2019. I have reviewed the chart and agree that the record accurately reflects my personal performance of the history, physical exam, assessment and plan. I have also personally directed, reviewed and agreed to the chart.

## 2019-04-25 NOTE — DISCUSSION/SUMMARY
[de-identified] : I informed Ms. Connell that the MRI of her left hip shows tendinitis and some arthritis. Additionally, I noted trochanteric bursitis on physical exam. I injected the trochanteric bursa with cortisone, which was tolerated well. I explained that this injection should help to treat symptoms but will not cure the underlying condition. I informed the patient that the level of pain and disability she is experiencing does not correspond with the level of damage on imaging of the hip, so I suspect some of her pain may originate in her spine. Her buttock and hamstring pain also suggest this.  I referred her to a physiatrist to get her back evaluated as well as her hip and ordered an MRI of the spine for her to do before she sees the physiatrist. I also wrote a physical therapy prescription. \par No need for or potential benefit from hip surgery in her case at this time.

## 2019-04-25 NOTE — PROCEDURE
[Injection] : Injection [Effusion] : Effusion [Left] : of the left [Inflammation] : Inflammation [Osteoarthritis] : Osteoarthritis [Patient] : patient [Joint Pain] : Joint pain [Risk] : risk [Benefits] : benefits [Bleeding] : bleeding [Infection] : infection [Alternatives] : alternatives [Verbal Consent Obtained] : verbal consent was obtained prior to the procedure [Allergic Reaction] : allergic reaction [Betadine] : Betadine [Alcohol] : Alcohol [Lateral] : lateral [Ethyl Chloride Spray] : ethyl chloride spray was used as a topical anesthetic [18] : an 18-gauge [1% Lidocaine___(mL)] : [unfilled] mL of 1% Lidocaine [Dexameth. 4mg/mL___(mL)] : [unfilled] ~U mL of 4 mg/mL dexamethasone [Tolerated Well] : The patient tolerated the procedure well [Bandage Applied] : a bandage [PRN] : as needed [None] : none [No Strenuous Activity___day(s)] : avoid strenuous activity for [unfilled] day(s) [de-identified] : trochanteric bursa

## 2019-04-25 NOTE — PHYSICAL EXAM
[de-identified] : Constitutional: Obese appearing. No acute distress.\par Mental Status: Alert & oriented to person, place and time. Normal affect.\par Pulmonary: No respiratory distress. Normal chest excursion.\par \par Gait: Limp without assistive devices, but presents in wheelchair.\par Ambulatory assist devices: Wheelchair.\par \par Cervical spine: Skin intact. No visible deformity. Painless active ROM without evident restriction.\par Bilateral upper extremities: Skin intact. No deformity. Painless active ROM without evident restriction.\par Thoracolumbar spine: No deformity. No tenderness. No radicular pain on passive straight leg raise bilaterally.\par \par Pelvis: No pelvic obliquity. No tenderness.\par Leg lengths: Equal.\par \par Right Hip:\par Skin intact.\par No surgical scars.\par No erythema.\par No ecchymosis.\par No swelling.\par No deformity.\par No focal tenderness.\par Painless and unrestricted range of motion. \par No crepitation.\par No instability.\par \par Left Hip:\par Skin intact.\par No surgical scars.\par No erythema.\par No ecchymosis.\par No swelling.\par No deformity.\par Diffuse gluteal, sacroiliac, sciatic and trochanteric tenderness\par Preserved ROM but gluteal discomfort at extremes.\par Back and hip pain when lying down from seated position.\par No crepitation.\par No instability.\par \par Neurological: Intact distal crude touch sensation. Normal distal motor power.\par Cardiovascular: Palpable dorsalis pedis and posterior tibialis pulses. Brisk capillary refill. No peripheral edema.\par Lymphatics: No peripheral adenopathy appreciated. 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

## 2019-04-25 NOTE — HISTORY OF PRESENT ILLNESS
[de-identified] : 63 year old female presents today for follow up evaluation and MRI review of left hip. She reports marked, daily left hip area pain localized to the side of the hip and the buttock that radiates to the coccyx and occasionally to the left hamstring and right side. In the right hip, she reports mild, daily pain. Patient can walk less than 5 blocks with a severe limp. She presents today in a wheelchair and reports that she can use stairs, but with difficulty.\par

## 2019-04-25 NOTE — ADDENDUM
[FreeTextEntry1] : This note was written by Buffy Lala on 04/25/2019  acting as scribe for Dr. Lopez and SESAR Bazzi.

## 2019-05-10 ENCOUNTER — APPOINTMENT (OUTPATIENT)
Dept: INTERNAL MEDICINE | Facility: CLINIC | Age: 64
End: 2019-05-10

## 2019-05-16 ENCOUNTER — APPOINTMENT (OUTPATIENT)
Dept: NEUROLOGY | Facility: CLINIC | Age: 64
End: 2019-05-16
Payer: MEDICARE

## 2019-05-16 VITALS
OXYGEN SATURATION: 97 % | SYSTOLIC BLOOD PRESSURE: 134 MMHG | TEMPERATURE: 97.8 F | WEIGHT: 257 LBS | DIASTOLIC BLOOD PRESSURE: 77 MMHG | BODY MASS INDEX: 40.34 KG/M2 | HEART RATE: 67 BPM | HEIGHT: 67 IN

## 2019-05-16 PROCEDURE — 99205 OFFICE O/P NEW HI 60 MIN: CPT

## 2019-05-17 LAB — TSH SERPL-ACNC: 1.4 UIU/ML

## 2019-05-28 ENCOUNTER — APPOINTMENT (OUTPATIENT)
Dept: INTERNAL MEDICINE | Facility: CLINIC | Age: 64
End: 2019-05-28

## 2019-06-04 ENCOUNTER — FORM ENCOUNTER (OUTPATIENT)
Age: 64
End: 2019-06-04

## 2019-06-05 ENCOUNTER — OUTPATIENT (OUTPATIENT)
Dept: OUTPATIENT SERVICES | Facility: HOSPITAL | Age: 64
LOS: 1 days | End: 2019-06-05

## 2019-06-05 ENCOUNTER — APPOINTMENT (OUTPATIENT)
Dept: MRI IMAGING | Facility: CLINIC | Age: 64
End: 2019-06-05
Payer: MEDICARE

## 2019-06-05 DIAGNOSIS — Z98.891 HISTORY OF UTERINE SCAR FROM PREVIOUS SURGERY: Chronic | ICD-10-CM

## 2019-06-05 PROCEDURE — 70551 MRI BRAIN STEM W/O DYE: CPT | Mod: 26

## 2019-06-08 ENCOUNTER — FORM ENCOUNTER (OUTPATIENT)
Age: 64
End: 2019-06-08

## 2019-06-09 ENCOUNTER — APPOINTMENT (OUTPATIENT)
Dept: MRI IMAGING | Facility: HOSPITAL | Age: 64
End: 2019-06-09
Payer: MEDICARE

## 2019-06-09 ENCOUNTER — OUTPATIENT (OUTPATIENT)
Dept: OUTPATIENT SERVICES | Facility: HOSPITAL | Age: 64
LOS: 1 days | End: 2019-06-09
Payer: MEDICARE

## 2019-06-09 DIAGNOSIS — Z98.891 HISTORY OF UTERINE SCAR FROM PREVIOUS SURGERY: Chronic | ICD-10-CM

## 2019-06-09 PROCEDURE — 72148 MRI LUMBAR SPINE W/O DYE: CPT | Mod: 26

## 2019-06-09 PROCEDURE — 72148 MRI LUMBAR SPINE W/O DYE: CPT

## 2019-06-17 ENCOUNTER — RX RENEWAL (OUTPATIENT)
Age: 64
End: 2019-06-17

## 2019-06-19 ENCOUNTER — APPOINTMENT (OUTPATIENT)
Dept: PHYSICAL MEDICINE AND REHAB | Facility: CLINIC | Age: 64
End: 2019-06-19
Payer: MEDICARE

## 2019-06-19 VITALS — BODY MASS INDEX: 41.12 KG/M2 | HEART RATE: 55 BPM | OXYGEN SATURATION: 9 % | WEIGHT: 262 LBS | HEIGHT: 67 IN

## 2019-06-19 PROCEDURE — 99204 OFFICE O/P NEW MOD 45 MIN: CPT

## 2019-06-19 NOTE — PHYSICAL EXAM
[FreeTextEntry1] : GEN: AAOx3, NAD.\par PSYCH: Normal mood and affect. Responds appropriately to commands.\par EYES: Sclerae Anicteric. No discharge. EOMI.\par RESP: Breathing unlabored.\par CV: DP pulses 2+ and equal. No varicosities noted.\par EXT: No C/C/E.\par SKIN: No lesions noted.\par STRENGTH: 5/5 bilateral hip flexors, knee extensors, knee flexors, ankle dorsiflexors, long toe extensors, ankle plantar flexors, hip extensors, hip abductors.\par TONE: Normal, No clonus.\par REFLEXES: Symmetric patella, medial hamstring, achilles. Plantars downgoing bilaterally.\par SENS: Grossly intact to light touch bilateral lower extremities.\par INSP: Spine alignment is midline, with no evidence of scoliosis.\par STANCE: No Trendelenburg with single leg stance.\par GAIT: (+) antalgic, normal reciprocating heel to toe\par LUMBAR ROM: Flexion limited with axial/radicular pain. Extension/Oblique extension limited with axial/radicular pain.\par HIP ROM: Full and pain free bilaterally.\par PALP: There is no tenderness over the midline spinous processes, paravertebral muscles, SIJ, or greater trochanters bilaterally.\par SPECIAL: SLR and Slump (+) Left. FADIR, MARNI negative bilaterally.

## 2019-06-19 NOTE — ASSESSMENT
[FreeTextEntry1] : Impression:\par 1. Lumbar Spinal Stenosis\par \par Plan: After review of the history, physical examination, and imaging, the patient's symptoms are consistent with Lumbar Spinal Stenosis with claudication. The imaging results and diagnosis were discussed in detail with the patient. We discussed all the potential treatment options including physical therapy, oral medication, interventional spine procedures, and surgery; as well as alternative therapeutics such as acupuncture and massage. We also discussed the importance of weight loss, ergonomics, and posture in the long term management of the condition. At this time the patient is interested in interventional options for symptom reduction. I have offered the patient the option of an epidural steroid injection. We discussed all the risks, benefits, alternative treatments, and prognosis. The patient expressed understanding and would like to move forward. We will schedule for the injection as well as follow up post-procedure. We will plan for Caudal SAMI. Depending on the response we may consider Left TFESI and/or Facet injections. The patient requested pain medication and reports allergic rxn to NSAIDs. I have given her a Rx for Tylenol #3 to use sparingly and emphasized that this is not a medication that will be continued to be prescribed. We discussed the medication in detail with regard to appropriate use, adverse effects, and expected outcome. The patient expressed verbal understanding and is in agreement with the plan of care. All of the patient's questions and concerns were addressed during today's visit.

## 2019-06-19 NOTE — DATA REVIEWED
[FreeTextEntry1] : MR LS 6/2019: L4/5 central canal stenosis due to facet arthropathy and disc disease. L5-S1 Left subarticular disc herniation with left-sided foraminal narrowing and lateral recess stenosis affecting the traversing left S1 and/or foraminal left L5 nerve roots.

## 2019-06-19 NOTE — HISTORY OF PRESENT ILLNESS
[FreeTextEntry1] : Ms. BUDDY RUIZ is a very pleasant 63 year female who comes in for evaluation of left buttock/low back/and bilateral hamstring pain without any specific injury or inciting event. The pain is predominantly located in the left buttocks and travels to the left hamstring intermittent in nature and described as tight/stiff, the patient also gets bilateral hamstring tightness/heaviness. The pain is rated as 8/10 during today's visit, and ranges from 8-10/10. The patient's symptoms are aggravated by walking long distance/weather and alleviated by cold/sitting. Patient states she enjoys walking. The patient denies any night pain, numbness/tingling, weakness, or bowel/bladder dysfunction. The patient has no other complaints at this time.

## 2019-06-21 ENCOUNTER — MED ADMIN CHARGE (OUTPATIENT)
Age: 64
End: 2019-06-21

## 2019-06-21 ENCOUNTER — APPOINTMENT (OUTPATIENT)
Dept: INTERNAL MEDICINE | Facility: CLINIC | Age: 64
End: 2019-06-21
Payer: MEDICARE

## 2019-06-21 VITALS
DIASTOLIC BLOOD PRESSURE: 69 MMHG | HEIGHT: 67 IN | TEMPERATURE: 97.6 F | HEART RATE: 61 BPM | OXYGEN SATURATION: 97 % | WEIGHT: 263 LBS | SYSTOLIC BLOOD PRESSURE: 133 MMHG | BODY MASS INDEX: 41.28 KG/M2

## 2019-06-21 DIAGNOSIS — I10 ESSENTIAL (PRIMARY) HYPERTENSION: ICD-10-CM

## 2019-06-21 DIAGNOSIS — H91.92 UNSPECIFIED HEARING LOSS, LEFT EAR: ICD-10-CM

## 2019-06-21 DIAGNOSIS — I25.10 ATHEROSCLEROTIC HEART DISEASE OF NATIVE CORONARY ARTERY W/OUT ANGINA PECTORIS: ICD-10-CM

## 2019-06-21 DIAGNOSIS — Z23 ENCOUNTER FOR IMMUNIZATION: ICD-10-CM

## 2019-06-21 DIAGNOSIS — R51 HEADACHE: ICD-10-CM

## 2019-06-21 DIAGNOSIS — F41.9 ANXIETY DISORDER, UNSPECIFIED: ICD-10-CM

## 2019-06-21 DIAGNOSIS — F32.9 ANXIETY DISORDER, UNSPECIFIED: ICD-10-CM

## 2019-06-21 PROCEDURE — G0009: CPT

## 2019-06-21 PROCEDURE — 36415 COLL VENOUS BLD VENIPUNCTURE: CPT

## 2019-06-21 PROCEDURE — 90732 PPSV23 VACC 2 YRS+ SUBQ/IM: CPT

## 2019-06-21 PROCEDURE — 99214 OFFICE O/P EST MOD 30 MIN: CPT | Mod: 25,GC

## 2019-06-21 RX ORDER — INSULIN LISPRO 100 [IU]/ML
100 INJECTION, SOLUTION INTRAVENOUS; SUBCUTANEOUS
Qty: 2 | Refills: 2 | Status: DISCONTINUED | COMMUNITY
End: 2019-06-21

## 2019-06-21 RX ORDER — OMEPRAZOLE 40 MG/1
40 CAPSULE, DELAYED RELEASE ORAL
Qty: 90 | Refills: 0 | Status: ACTIVE | COMMUNITY
Start: 2018-11-20 | End: 1900-01-01

## 2019-06-21 RX ORDER — INSULIN GLARGINE 100 [IU]/ML
100 INJECTION, SOLUTION SUBCUTANEOUS
Qty: 1 | Refills: 2 | Status: DISCONTINUED | COMMUNITY
End: 2019-06-21

## 2019-06-21 RX ORDER — LOSARTAN POTASSIUM 25 MG/1
25 TABLET, FILM COATED ORAL DAILY
Qty: 30 | Refills: 5 | Status: DISCONTINUED | COMMUNITY
End: 2019-06-21

## 2019-06-21 RX ORDER — ATORVASTATIN CALCIUM 80 MG/1
80 TABLET, FILM COATED ORAL
Qty: 30 | Refills: 3 | Status: DISCONTINUED | COMMUNITY
End: 2019-06-21

## 2019-07-01 LAB
ANION GAP SERPL CALC-SCNC: 12 MMOL/L
BUN SERPL-MCNC: 19 MG/DL
CALCIUM SERPL-MCNC: 9.4 MG/DL
CHLORIDE SERPL-SCNC: 108 MMOL/L
CHOLEST SERPL-MCNC: 136 MG/DL
CHOLEST/HDLC SERPL: 3 RATIO
CO2 SERPL-SCNC: 26 MMOL/L
CREAT SERPL-MCNC: 0.93 MG/DL
CREAT SPEC-SCNC: 108 MG/DL
ESTIMATED AVERAGE GLUCOSE: 189 MG/DL
GLUCOSE SERPL-MCNC: 140 MG/DL
HBA1C MFR BLD HPLC: 8.2 %
HDLC SERPL-MCNC: 46 MG/DL
LDLC SERPL CALC-MCNC: 70 MG/DL
MICROALBUMIN 24H UR DL<=1MG/L-MCNC: 7 MG/DL
MICROALBUMIN/CREAT 24H UR-RTO: 65 MG/G
POTASSIUM SERPL-SCNC: 4.2 MMOL/L
SODIUM SERPL-SCNC: 146 MMOL/L
TRIGL SERPL-MCNC: 100 MG/DL

## 2019-07-02 ENCOUNTER — APPOINTMENT (OUTPATIENT)
Dept: PHYSICAL MEDICINE AND REHAB | Facility: CLINIC | Age: 64
End: 2019-07-02
Payer: MEDICARE

## 2019-07-02 DIAGNOSIS — M54.17 RADICULOPATHY, LUMBOSACRAL REGION: ICD-10-CM

## 2019-07-02 PROCEDURE — 64483 NJX AA&/STRD TFRM EPI L/S 1: CPT | Mod: 50

## 2019-07-03 ENCOUNTER — APPOINTMENT (OUTPATIENT)
Dept: MAMMOGRAPHY | Facility: HOSPITAL | Age: 64
End: 2019-07-03
Payer: MEDICARE

## 2019-07-03 ENCOUNTER — OUTPATIENT (OUTPATIENT)
Dept: OUTPATIENT SERVICES | Facility: HOSPITAL | Age: 64
LOS: 1 days | End: 2019-07-03
Payer: MEDICARE

## 2019-07-03 DIAGNOSIS — Z98.891 HISTORY OF UTERINE SCAR FROM PREVIOUS SURGERY: Chronic | ICD-10-CM

## 2019-07-03 PROCEDURE — 77067 SCR MAMMO BI INCL CAD: CPT

## 2019-07-03 PROCEDURE — 77063 BREAST TOMOSYNTHESIS BI: CPT | Mod: 26

## 2019-07-03 PROCEDURE — 77067 SCR MAMMO BI INCL CAD: CPT | Mod: 26

## 2019-07-03 PROCEDURE — 77063 BREAST TOMOSYNTHESIS BI: CPT

## 2019-07-17 ENCOUNTER — APPOINTMENT (OUTPATIENT)
Dept: PHYSICAL MEDICINE AND REHAB | Facility: CLINIC | Age: 64
End: 2019-07-17
Payer: MEDICARE

## 2019-07-17 VITALS — BODY MASS INDEX: 41.28 KG/M2 | HEIGHT: 67 IN | WEIGHT: 263 LBS

## 2019-07-17 DIAGNOSIS — M48.062 SPINAL STENOSIS, LUMBAR REGION WITH NEUROGENIC CLAUDICATION: ICD-10-CM

## 2019-07-17 PROCEDURE — 99214 OFFICE O/P EST MOD 30 MIN: CPT

## 2019-07-17 RX ORDER — ACETAMINOPHEN AND CODEINE 300; 30 MG/1; MG/1
300-30 TABLET ORAL TWICE DAILY
Qty: 28 | Refills: 0 | Status: COMPLETED | COMMUNITY
Start: 2019-06-19 | End: 2019-07-17

## 2019-07-17 NOTE — PHYSICAL EXAM
[FreeTextEntry1] : GEN: AAOx3, NAD.\par PSYCH: Normal mood and affect. Responds appropriately to commands.\par EYES: Sclerae Anicteric. No discharge. EOMI.\par RESP: Breathing unlabored.\par CV: DP pulses 2+ and equal. No varicosities noted.\par EXT: No C/C/E.\par SKIN: No lesions noted.\par STRENGTH: 5/5 bilateral hip flexors, knee extensors, knee flexors, ankle dorsiflexors, long toe extensors, ankle plantar flexors, hip extensors, hip abductors.\par TONE: Normal, No clonus.\par REFLEXES: Symmetric patella, medial hamstring, achilles. Plantars downgoing bilaterally.\par SENS: Grossly intact to light touch bilateral lower extremities.\par INSP: Spine alignment is midline, with no evidence of scoliosis.\par STANCE: No Trendelenburg with single leg stance.\par GAIT: (+) antalgic, normal reciprocating heel to toe\par LUMBAR ROM: Flexion full/pain free. Extension/Oblique extension limited with axial Sx\par HIP ROM: Full and pain free bilaterally.\par PALP: There is no tenderness over the midline spinous processes, paravertebral muscles, SIJ, or greater trochanters bilaterally.\par SPECIAL: SLR and Slump (-) bilaterally. FADIR, MARNI negative bilaterally.

## 2019-07-17 NOTE — HISTORY OF PRESENT ILLNESS
[FreeTextEntry1] : Ms. BUDDY RUIZ is a very pleasant 63 year female who comes in for evaluation of left buttock/low back/and bilateral hamstring pain after undergoing an SAMI on 07/02/19. The patient reports approximately 50-60% improvement following the procedure at this time. There is still some residual discomfort, which is now predominantly located in the groin on both sides, intermittent in nature and described as tight/stiff. The back/buttock/hamstring pain is nearly completely gone. The pain is rated as 0/10 during today's visit, and ranges from 0-40/10. The patient's symptoms are aggravated by walking long distance/weather and alleviated by cold/sitting. Patient states she enjoys walking. The patient denies any night pain, numbness/tingling, weakness, or bowel/bladder dysfunction. The patient has no other complaints at this time.

## 2019-07-17 NOTE — DATA REVIEWED
[FreeTextEntry1] : MR LS 6/2019: L4/5 central canal stenosis due to facet arthropathy and disc disease. L5-S1 Left subarticular disc herniation with left-sided foraminal narrowing and lateral recess stenosis affecting the traversing left S1 and/or foraminal left L5 nerve roots. \par \par XR Pelvis 2/2019: Degenerative change bilateral hips

## 2019-07-17 NOTE — ASSESSMENT
[FreeTextEntry1] : Impression:\par 1. Lumbar Spinal Stenosis\par 2. Bilateral Hip DJD\par \par Plan: The patient has shown a positive response to the SAMI  reporting near complete resolution of back/leg pain. Review of the history, physical examination, and imaging, the patient's residual symptoms are more consistent with hip DJD. The imaging results and diagnosis were reviewed with the patient. We reviewed potential treatment options including physical therapy, oral medication, interventional spine procedures, and surgery; as well as alternative therapeutics such as acupuncture and massage. We also reviewed the importance of biomechanics, ergonomics, and posture in the long term management of the condition. I emphasized the importance of weight loss and general exercise over the next few months. Depending on the severity of symptoms we may consider hip CSI. The patient expressed verbal understanding and is in agreement with the plan of care. All of the patient's questions and concerns were addressed during today's visit.

## 2019-07-24 ENCOUNTER — EMERGENCY (EMERGENCY)
Facility: HOSPITAL | Age: 64
LOS: 1 days | Discharge: HOME | End: 2019-07-24
Attending: EMERGENCY MEDICINE | Admitting: EMERGENCY MEDICINE
Payer: MEDICARE

## 2019-07-24 ENCOUNTER — APPOINTMENT (OUTPATIENT)
Dept: NEUROLOGY | Facility: CLINIC | Age: 64
End: 2019-07-24
Payer: MEDICARE

## 2019-07-24 VITALS
TEMPERATURE: 98 F | SYSTOLIC BLOOD PRESSURE: 129 MMHG | OXYGEN SATURATION: 98 % | RESPIRATION RATE: 17 BRPM | DIASTOLIC BLOOD PRESSURE: 71 MMHG | WEIGHT: 257.94 LBS | HEART RATE: 57 BPM | HEIGHT: 67 IN

## 2019-07-24 VITALS
OXYGEN SATURATION: 99 % | WEIGHT: 258 LBS | BODY MASS INDEX: 40.49 KG/M2 | HEIGHT: 67 IN | HEART RATE: 60 BPM | SYSTOLIC BLOOD PRESSURE: 132 MMHG | TEMPERATURE: 98.2 F | DIASTOLIC BLOOD PRESSURE: 76 MMHG

## 2019-07-24 VITALS
SYSTOLIC BLOOD PRESSURE: 152 MMHG | TEMPERATURE: 98 F | RESPIRATION RATE: 18 BRPM | DIASTOLIC BLOOD PRESSURE: 83 MMHG | OXYGEN SATURATION: 96 % | HEART RATE: 52 BPM

## 2019-07-24 DIAGNOSIS — I10 ESSENTIAL (PRIMARY) HYPERTENSION: ICD-10-CM

## 2019-07-24 DIAGNOSIS — R31.9 HEMATURIA, UNSPECIFIED: ICD-10-CM

## 2019-07-24 DIAGNOSIS — Z79.899 OTHER LONG TERM (CURRENT) DRUG THERAPY: ICD-10-CM

## 2019-07-24 DIAGNOSIS — Z95.5 PRESENCE OF CORONARY ANGIOPLASTY IMPLANT AND GRAFT: Chronic | ICD-10-CM

## 2019-07-24 DIAGNOSIS — R41.3 OTHER AMNESIA: ICD-10-CM

## 2019-07-24 DIAGNOSIS — N20.0 CALCULUS OF KIDNEY: ICD-10-CM

## 2019-07-24 DIAGNOSIS — Z79.84 LONG TERM (CURRENT) USE OF ORAL HYPOGLYCEMIC DRUGS: ICD-10-CM

## 2019-07-24 DIAGNOSIS — R51 HEADACHE: ICD-10-CM

## 2019-07-24 DIAGNOSIS — E11.9 TYPE 2 DIABETES MELLITUS WITHOUT COMPLICATIONS: ICD-10-CM

## 2019-07-24 DIAGNOSIS — E78.5 HYPERLIPIDEMIA, UNSPECIFIED: ICD-10-CM

## 2019-07-24 DIAGNOSIS — Z98.891 HISTORY OF UTERINE SCAR FROM PREVIOUS SURGERY: Chronic | ICD-10-CM

## 2019-07-24 DIAGNOSIS — N95.0 POSTMENOPAUSAL BLEEDING: ICD-10-CM

## 2019-07-24 LAB
ALBUMIN SERPL ELPH-MCNC: 3.9 G/DL — SIGNIFICANT CHANGE UP (ref 3.3–5)
ALP SERPL-CCNC: 89 U/L — SIGNIFICANT CHANGE UP (ref 40–120)
ALT FLD-CCNC: 27 U/L — SIGNIFICANT CHANGE UP (ref 10–45)
ANION GAP SERPL CALC-SCNC: 10 MMOL/L — SIGNIFICANT CHANGE UP (ref 5–17)
APPEARANCE UR: CLEAR — SIGNIFICANT CHANGE UP
APTT BLD: 31.1 SEC — SIGNIFICANT CHANGE UP (ref 27.5–36.3)
AST SERPL-CCNC: 16 U/L — SIGNIFICANT CHANGE UP (ref 10–40)
BASOPHILS # BLD AUTO: 0.04 K/UL — SIGNIFICANT CHANGE UP (ref 0–0.2)
BASOPHILS NFR BLD AUTO: 0.4 % — SIGNIFICANT CHANGE UP (ref 0–2)
BILIRUB SERPL-MCNC: 0.4 MG/DL — SIGNIFICANT CHANGE UP (ref 0.2–1.2)
BILIRUB UR-MCNC: NEGATIVE — SIGNIFICANT CHANGE UP
BUN SERPL-MCNC: 21 MG/DL — SIGNIFICANT CHANGE UP (ref 7–23)
CALCIUM SERPL-MCNC: 9.6 MG/DL — SIGNIFICANT CHANGE UP (ref 8.4–10.5)
CHLORIDE SERPL-SCNC: 107 MMOL/L — SIGNIFICANT CHANGE UP (ref 96–108)
CO2 SERPL-SCNC: 26 MMOL/L — SIGNIFICANT CHANGE UP (ref 22–31)
COLOR SPEC: YELLOW — SIGNIFICANT CHANGE UP
CREAT SERPL-MCNC: 0.96 MG/DL — SIGNIFICANT CHANGE UP (ref 0.5–1.3)
DIFF PNL FLD: ABNORMAL
EOSINOPHIL # BLD AUTO: 0.17 K/UL — SIGNIFICANT CHANGE UP (ref 0–0.5)
EOSINOPHIL NFR BLD AUTO: 1.8 % — SIGNIFICANT CHANGE UP (ref 0–6)
GLUCOSE SERPL-MCNC: 197 MG/DL — HIGH (ref 70–99)
GLUCOSE UR QL: NEGATIVE — SIGNIFICANT CHANGE UP
HCT VFR BLD CALC: 41.9 % — SIGNIFICANT CHANGE UP (ref 34.5–45)
HGB BLD-MCNC: 13.5 G/DL — SIGNIFICANT CHANGE UP (ref 11.5–15.5)
IMM GRANULOCYTES NFR BLD AUTO: 0.3 % — SIGNIFICANT CHANGE UP (ref 0–1.5)
INR BLD: 1.32 — HIGH (ref 0.88–1.16)
KETONES UR-MCNC: NEGATIVE — SIGNIFICANT CHANGE UP
LEUKOCYTE ESTERASE UR-ACNC: NEGATIVE — SIGNIFICANT CHANGE UP
LYMPHOCYTES # BLD AUTO: 1.39 K/UL — SIGNIFICANT CHANGE UP (ref 1–3.3)
LYMPHOCYTES # BLD AUTO: 14.7 % — SIGNIFICANT CHANGE UP (ref 13–44)
MCHC RBC-ENTMCNC: 30.3 PG — SIGNIFICANT CHANGE UP (ref 27–34)
MCHC RBC-ENTMCNC: 32.2 GM/DL — SIGNIFICANT CHANGE UP (ref 32–36)
MCV RBC AUTO: 94.2 FL — SIGNIFICANT CHANGE UP (ref 80–100)
MONOCYTES # BLD AUTO: 0.41 K/UL — SIGNIFICANT CHANGE UP (ref 0–0.9)
MONOCYTES NFR BLD AUTO: 4.3 % — SIGNIFICANT CHANGE UP (ref 2–14)
NEUTROPHILS # BLD AUTO: 7.44 K/UL — HIGH (ref 1.8–7.4)
NEUTROPHILS NFR BLD AUTO: 78.5 % — HIGH (ref 43–77)
NITRITE UR-MCNC: NEGATIVE — SIGNIFICANT CHANGE UP
NRBC # BLD: 0 /100 WBCS — SIGNIFICANT CHANGE UP (ref 0–0)
PH UR: 5.5 — SIGNIFICANT CHANGE UP (ref 5–8)
PLATELET # BLD AUTO: 228 K/UL — SIGNIFICANT CHANGE UP (ref 150–400)
POTASSIUM SERPL-MCNC: 4.5 MMOL/L — SIGNIFICANT CHANGE UP (ref 3.5–5.3)
POTASSIUM SERPL-SCNC: 4.5 MMOL/L — SIGNIFICANT CHANGE UP (ref 3.5–5.3)
PROT SERPL-MCNC: 7.2 G/DL — SIGNIFICANT CHANGE UP (ref 6–8.3)
PROT UR-MCNC: 30 MG/DL
PROTHROM AB SERPL-ACNC: 15 SEC — HIGH (ref 10–12.9)
RBC # BLD: 4.45 M/UL — SIGNIFICANT CHANGE UP (ref 3.8–5.2)
RBC # FLD: 14.6 % — HIGH (ref 10.3–14.5)
SODIUM SERPL-SCNC: 143 MMOL/L — SIGNIFICANT CHANGE UP (ref 135–145)
SP GR SPEC: >=1.03 — SIGNIFICANT CHANGE UP (ref 1–1.03)
UROBILINOGEN FLD QL: 0.2 E.U./DL — SIGNIFICANT CHANGE UP
WBC # BLD: 9.48 K/UL — SIGNIFICANT CHANGE UP (ref 3.8–10.5)
WBC # FLD AUTO: 9.48 K/UL — SIGNIFICANT CHANGE UP (ref 3.8–10.5)

## 2019-07-24 PROCEDURE — 76830 TRANSVAGINAL US NON-OB: CPT

## 2019-07-24 PROCEDURE — 36415 COLL VENOUS BLD VENIPUNCTURE: CPT

## 2019-07-24 PROCEDURE — 81001 URINALYSIS AUTO W/SCOPE: CPT

## 2019-07-24 PROCEDURE — 87086 URINE CULTURE/COLONY COUNT: CPT

## 2019-07-24 PROCEDURE — 96375 TX/PRO/DX INJ NEW DRUG ADDON: CPT

## 2019-07-24 PROCEDURE — 85025 COMPLETE CBC W/AUTO DIFF WBC: CPT

## 2019-07-24 PROCEDURE — 99284 EMERGENCY DEPT VISIT MOD MDM: CPT | Mod: 25

## 2019-07-24 PROCEDURE — 85730 THROMBOPLASTIN TIME PARTIAL: CPT

## 2019-07-24 PROCEDURE — 76856 US EXAM PELVIC COMPLETE: CPT

## 2019-07-24 PROCEDURE — 74176 CT ABD & PELVIS W/O CONTRAST: CPT

## 2019-07-24 PROCEDURE — 76830 TRANSVAGINAL US NON-OB: CPT | Mod: 26

## 2019-07-24 PROCEDURE — 85610 PROTHROMBIN TIME: CPT

## 2019-07-24 PROCEDURE — 99214 OFFICE O/P EST MOD 30 MIN: CPT

## 2019-07-24 PROCEDURE — 80053 COMPREHEN METABOLIC PANEL: CPT

## 2019-07-24 PROCEDURE — 74176 CT ABD & PELVIS W/O CONTRAST: CPT | Mod: 26

## 2019-07-24 PROCEDURE — 96374 THER/PROPH/DIAG INJ IV PUSH: CPT

## 2019-07-24 PROCEDURE — 76856 US EXAM PELVIC COMPLETE: CPT | Mod: 26

## 2019-07-24 PROCEDURE — 99285 EMERGENCY DEPT VISIT HI MDM: CPT

## 2019-07-24 RX ORDER — OXYCODONE AND ACETAMINOPHEN 5; 325 MG/1; MG/1
1 TABLET ORAL ONCE
Refills: 0 | Status: DISCONTINUED | OUTPATIENT
Start: 2019-07-24 | End: 2019-07-24

## 2019-07-24 RX ORDER — MORPHINE SULFATE 50 MG/1
4 CAPSULE, EXTENDED RELEASE ORAL ONCE
Refills: 0 | Status: DISCONTINUED | OUTPATIENT
Start: 2019-07-24 | End: 2019-07-24

## 2019-07-24 RX ORDER — ACETAMINOPHEN WITH CODEINE 300MG-30MG
1 TABLET ORAL
Qty: 8 | Refills: 0
Start: 2019-07-24 | End: 2019-07-27

## 2019-07-24 RX ORDER — ACETAMINOPHEN 500 MG
1000 TABLET ORAL ONCE
Refills: 0 | Status: COMPLETED | OUTPATIENT
Start: 2019-07-24 | End: 2019-07-24

## 2019-07-24 RX ORDER — KETOROLAC TROMETHAMINE 30 MG/ML
15 SYRINGE (ML) INJECTION ONCE
Refills: 0 | Status: DISCONTINUED | OUTPATIENT
Start: 2019-07-24 | End: 2019-07-24

## 2019-07-24 RX ORDER — SODIUM CHLORIDE 9 MG/ML
1000 INJECTION INTRAMUSCULAR; INTRAVENOUS; SUBCUTANEOUS ONCE
Refills: 0 | Status: COMPLETED | OUTPATIENT
Start: 2019-07-24 | End: 2019-07-24

## 2019-07-24 RX ADMIN — Medication 1000 MILLIGRAM(S): at 17:12

## 2019-07-24 RX ADMIN — Medication 15 MILLIGRAM(S): at 16:28

## 2019-07-24 RX ADMIN — MORPHINE SULFATE 4 MILLIGRAM(S): 50 CAPSULE, EXTENDED RELEASE ORAL at 12:58

## 2019-07-24 RX ADMIN — SODIUM CHLORIDE 1000 MILLILITER(S): 9 INJECTION INTRAMUSCULAR; INTRAVENOUS; SUBCUTANEOUS at 12:56

## 2019-07-24 RX ADMIN — MORPHINE SULFATE 4 MILLIGRAM(S): 50 CAPSULE, EXTENDED RELEASE ORAL at 13:10

## 2019-07-24 NOTE — ED PROVIDER NOTE - CARE PROVIDER_API CALL
Jamila Reza)  Obstetrics and Gynecology  [address], [facility] [address]  [place] [address]  Phone: [phone]  Fax: [phone]  Follow Up Time:

## 2019-07-24 NOTE — ED PROVIDER NOTE - NSFOLLOWUPINSTRUCTIONS_ED_ALL_ED_FT
I have discussed the discharge plan with the patient. The patient agrees with the plan, as discussed.  The patient understands Emergency Department diagnosis is a preliminary diagnosis often based on limited information and that the patient must adhere to the follow-up plan as discussed.  The patient understands that if the symptoms worsen or if prescribed medications do not have the desired/planned effect that the patient may return to the Emergency Department at any time for further evaluation and treatment.    Renal Colic  Image   Renal colic is pain that is caused by passing a kidney stone. The pain can be sharp and severe. It may be felt in the back, abdomen, side (flank), or groin. It can cause nausea. Renal colic can come and go.    Follow these instructions at home:  Watch your condition for any changes. The following actions may help to lessen any discomfort that you are feeling:    Medicines     Take over-the-counter and prescription medicines only as told by your health care provider.  Do not drive or use heavy machinery while taking prescription pain medicine.  Eating and drinking     Image   Drink enough fluid to keep your urine pale yellow. You may be instructed to drink at least 8–10 glasses of water each day. Follow instructions from your health care provider.  If directed, change your diet. This may include:  Limiting how much sodium you eat. You may need to eat less than 2 grams (2,000 mg) per day.  Eating more fruits and vegetables.  Limiting how much animal protein, such as red meat, poultry, fish, and eggs, you eat.  Avoiding foods such as spinach, rhubarb, sweet potatoes, and nuts. These make kidney stones more likely to form.  Follow instructions from your health care provider about eating or drinking restrictions.  General instructions     Keep all follow-up visits as told by your health care provider. This is important.  Collect urine samples as told by your health care provider. You may need to collect a urine sample:  24 hours after you pass the stone.  8–12 weeks after passing the kidney stone, and every 6–12 months after that.  Strain your urine every time you urinate, for as long as directed. Use the strainer that your health care provider recommends.  Do not throw out the kidney stone after passing it. Keep the stone so it can be tested by your health care provider. Testing the makeup of your kidney stone may help understand how to prevent you from getting kidney stones in the future.  Contact a health care provider if:  You have a fever or chills.  Your urine smells bad or looks cloudy.  You have pain or burning when you pass urine.  Get help right away if:  Your flank pain or groin pain suddenly worsens.  You become confused or disoriented or you lose consciousness.  Summary  Renal colic is pain that is caused by passing a kidney stone.  Take over-the-counter and prescription medicines only as told by your health care provider.  Drink enough fluid to keep your urine pale yellow. You may be instructed to drink at least 8–10 glasses of water each day. Follow instructions from your health care provider.  Strain your urine every time you urinate, for as long as directed. Use the strainer that your health care provider recommends.  Do not throw out the kidney stone after passing it. Keep the stone so it can be tested by your health care provider.  This information is not intended to replace advice given to you by your health care provider. Make sure you discuss any questions you have with your health care provider.      Dietary Guidelines to Help Prevent Kidney Stones  Kidney stones are deposits of minerals and salts that form inside your kidneys. Your risk of developing kidney stones may be greater depending on your diet, your lifestyle, the medicines you take, and whether you have certain medical conditions. Most people can reduce their chances of developing kidney stones by following the instructions below. Depending on your overall health and the type of kidney stones you tend to develop, your dietitian may give you more specific instructions.    What are tips for following this plan?  Reading food labels     Choose foods with "no salt added" or "low-salt" labels. Limit your sodium intake to less than 1500 mg per day.  Choose foods with calcium for each meal and snack. Try to eat about 300 mg of calcium at each meal. Foods that contain 200–500 mg of calcium per serving include:  8 oz (237 ml) of milk, fortified nondairy milk, and fortified fruit juice.  8 oz (237 ml) of kefir, yogurt, and soy yogurt.  4 oz (118 ml) of tofu.  1 oz of cheese.  1 cup (300 g) of dried figs.  1 cup (91 g) of cooked broccoli.  1–3 oz can of sardines or mackerel.  Most people need 1000 to 1500 mg of calcium each day. Talk to your dietitian about how much calcium is recommended for you.  Shopping     Buy plenty of fresh fruits and vegetables. Most people do not need to avoid fruits and vegetables, even if they contain nutrients that may contribute to kidney stones.  When shopping for convenience foods, choose:  Whole pieces of fruit.  Premade salads with dressing on the side.  Low-fat fruit and yogurt smoothies.  Avoid buying frozen meals or prepared deli foods.  Look for foods with live cultures, such as yogurt and kefir.  Cooking     Do not add salt to food when cooking. Place a salt shaker on the table and allow each person to add his or her own salt to taste.  Use vegetable protein, such as beans, textured vegetable protein (TVP), or tofu instead of meat in pasta, casseroles, and soups.  Meal planning     Image   Eat less salt, if told by your dietitian. To do this:  Avoid eating processed or premade food.  Avoid eating fast food.  Eat less animal protein, including cheese, meat, poultry, or fish, if told by your dietitian. To do this:  Limit the number of times you have meat, poultry, fish, or cheese each week. Eat a diet free of meat at least 2 days a week.  Eat only one serving each day of meat, poultry, fish, or seafood.  When you prepare animal protein, cut pieces into small portion sizes. For most meat and fish, one serving is about the size of one deck of cards.  Eat at least 5 servings of fresh fruits and vegetables each day. To do this:  Keep fruits and vegetables on hand for snacks.  Eat 1 piece of fruit or a handful of berries with breakfast.  Have a salad and fruit at lunch.  Have two kinds of vegetables at dinner.  Limit foods that are high in a substance called oxalate. These include:  Spinach.  Rhubarb.  Beets.  Potato chips and french fries.  Nuts.  If you regularly take a diuretic medicine, make sure to eat at least 1–2 fruits or vegetables high in potassium each day. These include:  Avocado.  Banana.  Orange, prune, carrot, or tomato juice.  Baked potato.  Cabbage.  Beans and split peas.  General instructions     Image   Drink enough fluid to keep your urine clear or pale yellow. This is the most important thing you can do.  Talk to your health care provider and dietitian about taking daily supplements. Depending on your health and the cause of your kidney stones, you may be advised:  Not to take supplements with vitamin C.  To take a calcium supplement.  To take a daily probiotic supplement.  To take other supplements such as magnesium, fish oil, or vitamin B6.  Take all medicines and supplements as told by your health care provider.  Limit alcohol intake to no more than 1 drink a day for nonpregnant women and 2 drinks a day for men. One drink equals 12 oz of beer, 5 oz of wine, or 1½ oz of hard liquor.  Lose weight if told by your health care provider. Work with your dietitian to find strategies and an eating plan that works best for you.  What foods are not recommended?  Limit your intake of the following foods, or as told by your dietitian. Talk to your dietitian about specific foods you should avoid based on the type of kidney stones and your overall health.    Grains     Breads. Bagels. Rolls. Baked goods. Salted crackers. Cereal. Pasta.    Vegetables     Spinach. Rhubarb. Beets. Canned vegetables. Pickles. Olives.    Meats and other protein foods     Nuts. Nut butters. Large portions of meat, poultry, or fish. Salted or cured meats. Deli meats. Hot dogs. Sausages.    Dairy     Cheese.    Beverages     Regular soft drinks. Regular vegetable juice.    Seasonings and other foods     Seasoning blends with salt. Salad dressings. Canned soups. Soy sauce. Ketchup. Barbecue sauce. Canned pasta sauce. Casseroles. Pizza. Lasagna. Frozen meals. Potato chips. French fries.    Summary  You can reduce your risk of kidney stones by making changes to your diet.  The most important thing you can do is drink enough fluid. You should drink enough fluid to keep your urine clear or pale yellow.  Ask your health care provider or dietitian how much protein from animal sources you should eat each day, and also how much salt and calcium you should have each day.  This information is not intended to replace advice given to you by your health care provider. Make sure you discuss any questions you have with your health care provider.              POSTMENOPAUSAL BLEEDING - General Information     Postmenopausal Bleeding    WHAT YOU NEED TO KNOW:    What do I need to know about postmenopausal bleeding? Postmenopausal bleeding is bleeding that occurs after menopause. A woman who has not had a period for a full year after the age of 40 is considered to be in menopause. Postmenopausal bleeding may range from spotting to very heavy bleeding.    What causes postmenopausal bleeding?     Thinning of the endometrium (lining of the uterus) called endometrial atrophy      Polyps (noncancerous growths) that develop on the inner wall of your uterus or cervix      Hormone replacement therapy      Abnormal thickening of the endometrium called endometrial hyperplasia      Tamoxifen (medicine used to treat breast cancer)      Cervical, endometrial, or uterine cancer    How is postmenopausal bleeding diagnosed? Your healthcare provider will ask about medical conditions that you have, and that run in your family. He will also do a pelvic exam to check for problems with your cervix, uterus, and ovaries. You may also need any of the following:     An ultrasound uses sound waves to show pictures of your uterus on a monitor. Your healthcare provider may place saline fluid into your uterus with a catheter. The fluid helps show more detail in the ultrasound pictures of your uterus.       Endometrial biopsy is used to collect a sample of cells from the inside of your uterus to be tested for cancer.      Hysteroscopy is a procedure that is done to look inside your uterus. A small scope with a light and camera is placed into your vagina and cervix. Liquid or gas may be put through the scope to help healthcare providers see better.       Dilation and curettage (D&C) is a procedure to remove tissue from the lining of your uterus. The tissue is sent to a lab for tests.     How is postmenopausal bleeding treated? Treatment depends on the cause of your postmenopausal bleeding. If you have polyps, you may need surgery to remove them. Endometrial atrophy can be treated with medicines. Endometrial hyperplasia may be treated with progestin hormone therapy. Surgery to remove your uterus will be needed if you have endometrial or uterine cancer. Your fallopian tubes, ovaries, and nearby lymph nodes may also be removed.    When should I contact my healthcare provider?     You continue to have vaginal bleeding, even with treatment.       You have pain in your abdomen or pelvis.       You have questions or concerns about your condition or care.    CARE AGREEMENT:    You have the right to help plan your care. Learn about your health condition and how it may be treated. Discuss treatment options with your healthcare providers to decide what care you want to receive. You always have the right to refuse treatment.

## 2019-07-24 NOTE — ED ADULT TRIAGE NOTE - CHIEF COMPLAINT QUOTE
Patient complaining of hematuria and pelvic pain for one week.  Patient denies any blood thinners, N/V/D, fevers, chills, urinary pain or burning, back pain or any other complaints at this time.

## 2019-07-24 NOTE — ED PROVIDER NOTE - NSFOLLOWUPCLINICS_GEN_ALL_ED_FT
[facility] Urology Clinic  Urology  [address], [address]  [place], [place] [address]  Phone: [phone]  Fax:   Follow Up Time:

## 2019-07-24 NOTE — ED PROVIDER NOTE - PROGRESS NOTE DETAILS
pt re-evaluated and results discussed. Ct finding: + 2 small right sided ureteral stones, non-obstructing, no hydronephrosis, multiple stones in the kidneys b/l, US findings: thickened endometrium, normal ovaries b/l, stable H/H, normal BUN/Cr.  pt aware of results. d/c home stable. further f/u with urologist and GYN recommended. Pt verbalized understanding and ready for discharge

## 2019-07-24 NOTE — ED PROVIDER NOTE - PMH
CAD (coronary artery disease)    Diabetes    GERD (gastroesophageal reflux disease)    HLD (hyperlipidemia)    HTN (hypertension)    Kidney stones

## 2019-07-24 NOTE — ED PROVIDER NOTE - GENITOURINARY, MLM
Pelvic exam: no rash/lesions to external genitalia. Moderate amount of blood to vaginal vault. No uterine tenderness, no annexal mass palpated bilaterally.. R annexal tenderness.

## 2019-07-24 NOTE — PHYSICAL EXAM
[General Appearance - Alert] : alert [General Appearance - In No Acute Distress] : in no acute distress [Oriented To Time, Place, And Person] : oriented to person, place, and time [Place] : oriented to place [Person] : oriented to person [Time] : oriented to time [Cranial Nerves Optic (II)] : visual acuity intact bilaterally,  visual fields full to confrontation, pupils equal round and reactive to light [Cranial Nerves Facial (VII)] : face symmetrical [Cranial Nerves Trigeminal (V)] : facial sensation intact symmetrically [Cranial Nerves Vestibulocochlear (VIII)] : hearing was intact bilaterally [Cranial Nerves Glossopharyngeal (IX)] : tongue and palate midline [Cranial Nerves Accessory (XI - Cranial And Spinal)] : head turning and shoulder shrug symmetric [Motor Tone] : muscle tone was normal in all four extremities [Motor Strength] : muscle strength was normal in all four extremities [Sensation Tactile Decrease] : light touch was intact [FreeTextEntry1] : becomes tearful at times  [FreeTextEntry5] : EOMI, disconjugate gaze  [FreeTextEntry8] : antalgic gait
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
## 2019-07-24 NOTE — ED PROVIDER NOTE - ATTENDING CONTRIBUTION TO CARE
64 y/o F, morbidly obese, with PMH of DM2, HTN, HLD, CAD with stents, and kidney stones in the past presents to the ED complaining of lower abdominal pain and vaginal bleeding for the past week. Patient reports she has been menopausal since 50 y/o and had no vaginal bleeding until the present. She complains of intermittent bilateral lower abdominal pain that comes and goes, gradually getting worse since the bleeding started. Denies fever, chills, diarrhea, constipation, nausea, and vomiting. Pt AAO, NAD, RRR, CTA b/l, abd: soft with mild lower abd TTP, Labs/ u/s and CT noted and with no acute surgical findings. Pt to f/up with Gyn and Urology outpt. Stable for dc.

## 2019-07-24 NOTE — ED PROVIDER NOTE - OBJECTIVE STATEMENT
62 y/o F, morbidly obese, with PMHx of DM2, HTN, HLD, CAD with stents, and kidney stones in the past presents to the ER complaining of lower abdominal pain and vaginal bleeding for the past week. Patient reports she has been menopausal since 52 y/o and had no vaginal bleeding until the present. She complains of intermittent bilateral lower abdominal pain that comes and goes, gradually getting worse since the bleeding started. Denies fever, chills, diarrhea, constipation, nausea, and vomiting. Patient was able to schedule an appointment with GYN only for next month.

## 2019-07-24 NOTE — ED PROVIDER NOTE - CLINICAL SUMMARY MEDICAL DECISION MAKING FREE TEXT BOX
64 y/o F with PMHx DM2, HTN, CAD, and kidney stones in the past presents to the ED due to vaginal bleeding and lower abdominal pain x 1 week. No history of post-menopausal bleeding in the past. On exam, patient is afebrile, and vitals stable. Moderate amount of vaginal bleeding on pelvic exam. Will check labs, ultrasound pelvis, and possible abdominal CT scan. Patient describes abdominal pain as spasm-like, intermittent, recurrent, and similar to previous renal colic pain.

## 2019-07-24 NOTE — ED PROVIDER NOTE - GASTROINTESTINAL, MLM
Abdomen soft, bowel sounds present x 4 quadrants. Diffusely tender to lower abdomen, no guarding, no rebound, no CVA tenderness.

## 2019-07-24 NOTE — ED ADULT NURSE NOTE - OBJECTIVE STATEMENT
64 y/o female w/ hx of DM and HTN presents to the ED c/o moderate pelvic pain that began 1 week ago associated w/ vaginal bleeding. Pt reportedly had similar issue a few years ago but unsure why. Pt reports menopause 12 years ago. Denies any other sx.
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
## 2019-07-25 ENCOUNTER — APPOINTMENT (OUTPATIENT)
Dept: HEART AND VASCULAR | Facility: CLINIC | Age: 64
End: 2019-07-25
Payer: MEDICARE

## 2019-07-25 ENCOUNTER — NON-APPOINTMENT (OUTPATIENT)
Age: 64
End: 2019-07-25

## 2019-07-25 VITALS
DIASTOLIC BLOOD PRESSURE: 64 MMHG | BODY MASS INDEX: 40.81 KG/M2 | OXYGEN SATURATION: 98 % | WEIGHT: 260 LBS | HEIGHT: 67 IN | HEART RATE: 63 BPM | SYSTOLIC BLOOD PRESSURE: 118 MMHG

## 2019-07-25 LAB
CULTURE RESULTS: SIGNIFICANT CHANGE UP
SPECIMEN SOURCE: SIGNIFICANT CHANGE UP

## 2019-07-25 PROCEDURE — 99214 OFFICE O/P EST MOD 30 MIN: CPT | Mod: 25

## 2019-07-25 PROCEDURE — 93000 ELECTROCARDIOGRAM COMPLETE: CPT

## 2019-07-25 RX ORDER — LOSARTAN POTASSIUM AND HYDROCHLOROTHIAZIDE 12.5; 5 MG/1; MG/1
50-12.5 TABLET ORAL
Qty: 90 | Refills: 1 | Status: ACTIVE | COMMUNITY
Start: 2019-04-08 | End: 1900-01-01

## 2019-07-25 NOTE — DISCUSSION/SUMMARY
[Patient] : the patient [___ Month(s)] : [unfilled] month(s) [FreeTextEntry1] : 63 year old female smoker w h/o sherry, hl, htn, dm, gerd, obesity, asthma, cad s/p DERIK mRCA, microalbuminuria presents for f/up today \par \par -ekg 7/19: SB, normal intervals, no st/t changes\par -ekg 9/18 - nsr, normal intervals, no st/t changes\par -cardiac catheterization 9/4/18 with PTCA/DERIK mRCA 99% lesion, pLAD 50%, pLCx 30-50%, EF 55% EDP 14\par -labs 2019 reviewed\par -monitor microalbuminuria\par -BB on hold 2/2 madelyn\par -TTE 9/18 - ef 65%, no wma, trace mr/tr\par -continue asa, statin, plavix, losartan/hctz\par -urology/gyn f/up for renal calculi and vag bleeding from recent ER visit\par -endocrine recs for DM management\par -counseled on cvd rf\par -smoking cessation\par -cpap for sherry\par -f/up 6 months\par

## 2019-07-25 NOTE — HISTORY OF PRESENT ILLNESS
[FreeTextEntry1] : 63 year old female smoker w h/o sherry, hl, htn, dm, micoalbuminuria, obesity, asthma, gerd, cad s/p DERIK mRCA presents for f/up. \par \par Last seen \par dc'd ccb, statin and arb, pcp restared losartan/hctz and crestor\par Had neuro visit for HA with normal brain MRI\par Went to ER yesterday and noted to have renal calculi.\par \par \par \par Urine microalbumin showed microalbuminuria\par Admit to [facility] with cp/nstemi. Troponin trended up with peak 0.22 and had cardiac catheterization 18 w/ Dr. Schroeder. Pt is now s/p cardiac catheterization 18 with PTCA/DERIK mRCA 99% lesion, pLAD 50%, pLCx 30-50%, EF 55% EDP 14, right groin angioseal.Discharged off Beta blocker 2/2 bradycardia. Endocrine recs during hospitalization for DM to discharge with metformin 500 mg BID, Lantus 12 units at bedtime, and lispro 6 units TID before meals. \par \par No cp, sob, lh, edema, syncope, palptiations\par \par Cath \par 1) Two vessel CAD (90% mRCA, 50% pLAD)\par 2) Normal LV function with inferior hypokinesis EF 55%, LVEDP 14\par 3) Successful DERIK of mRCA with 3.5x15mm Resolute stent\par \par DM and HTN diagnosed in 30's\par \par PMH/PSH:\par gerd\par htn\par cad s/p DERIK mRCA Resolute \par c section\par sherry\par dm\par hl\par proteinuria\par nstemi\par asthma\par arthritis\par vit D deficiency\par kidney stones\par obesity\par \par ALL:\par celebrex - sob/dizziness\par \par FH:\par NC\par \par SH:\par smoker 50 pack year history - 3 cigs/day\par  but \par live w friend\par 4 children - healthy\par no etoh\par no ivda\par was home attendant\par from [place]\par \par \par MEDS:\par asa 81 mg qd\par crestor 20 mg qhs\par losartan/hctz 50/12.5 mg qd\par lantus\par metformin 500 mg bid\par humalog\par plavix 75 mg qd\par \par \par \par \par EXAM: ECHOCARDIOGRAM (CARDIOL) \par \par PROCEDURE DATE: 2018 \par \par \par \par INTERPRETATION: Patient Height: 170.0 cm\par Patient Weight: 118.0 kg\par BSA: 2.3 m^2\par Interpretation Summary\par The left atrial size is normal. The left atrial volume index is 27.5 cc/m2\par  (normal <34cc/m2). There is mild concentric left ventricular \par hypertrophy.The\par  left ventricular wall motion is normal. The left ventricular ejection \par fraction\par  is 65%. Right atrial size is normal.The right ventricle is normal in \par size and\par  function.There was insufficient TR detected from which to calculate \par pulmonary\par  artery systolic pressure. The IVC is dilated (>2.1 cm) with an abnormal\par  inspiratory collapse (<50%) consistent with elevated right atrial \par pressure.\par  No aortic root dilatation.Normal size aortic arch with no hemodynamic \par evidence\par  for coarctation.There is no pericardial effusion.\par Procedure Details\par A complete two-dimensional transthoracic echocardiogram was performed (2D,\par M-mode, spectral and color flow doppler).\par Study Quality: Good.\par Left Ventricle\par There is mild concentric left ventricular hypertrophy.\par The left ventricular wall motion is normal.\par The left ventricular ejection fraction is 65%.\par Left Atrium\par The left atrial volume index is 27.5 cc/m2 (normal <34cc/m2)\par The left atrial size is normal.\par Right Atrium\par Right atrial size is normal.\par Right Ventricle\par The right ventricle is normal in size and function.\par Aortic Valve\par The aortic valve is trileaflet.\par No aortic regurgitation noted.\par Mitral Valve\par Structurally normal mitral valve.\par There is trace mitral regurgitation.\par Tricuspid Valve\par Structurally normal tricuspid valve.\par There is trace tricuspid regurgitation.\par There was insufficient TR detected from which to calculate pulmonary \par artery\par systolic pressure.\par Pulmonic Valve\par Structurally normal pulmonic valve.\par No pulmonic regurgitation noted.\par Arteries and Venous System\par No aortic root dilatation.\par Normal size aortic arch with no hemodynamic evidence for coarctation\par The IVC is dilated (>2.1 cm) with an abnormal inspiratory collapse (<50%)\par consistent with elevated right atrial pressure.\par Pericardium / Pleura\par There is no pericardial effusion.\par Additional Comments\par No evidence for any hemodynamically significant valvular disease.\par Doppler Measurements & Calculations\par MV E point: 60.3 cm/sec\par MV A point: 67.8 cm/sec\par MV E/A: 0.9\par MV dec slope: 215.2 cm/sec^2\par Ao V2 max: 122.7 cm/sec\par Ao max P.0 mmHg\par Ao max PG (full): 2.0 mmHg\par ALEJA(V,A): 3.2 cm^2\par ALEJA(V,D): 3.2 cm^2\par LV max P mmHg\par LV mean P.1 mmHg\par LV V1 max: 100 cm/sec\par LV V1 mean: 65.7 cm/sec\par LV V1 VTI: 24.5 cm\par SV(LVOT): 97.1 ml\par SI(LVOT): 43.0 ml/m^2\par MMode 2D Measurements & Calculations\par IVSd: 1.4 cm\par LVIDd: 4.8 cm\par LVIDs: 3.4 cm\par LVPWd: 1.1 cm\par IVS/LVPW: 1.3\par FS: 28.6 %\par EDV(Teich): 106.1 ml\par ESV(Teich): 47.6 ml\par LV mass(C)d: 219.8 grams\par LV mass(C)dI: 97.3 grams/m^2\par SI(cubed): 30.7 ml/m^2\par Ao root diam: 4.1 cm\par Ao root area: 13.1 cm^2\par LA dimension: 2.6 cm\par LA/Ao: 0.6\par LVOT diam: 2.2 cm\par LVOT area: 4.0 cm^2\par LVOT area (M): 4.0 cm^2\par EDV(MOD-sp4): 118 ml\par EDV(MOD-sp2): 107 ml\par Interpreting Physician:Fred Shin electronically signed on 2018 \par 15:47:43\par \par \par \par \par \par \par \par "Thank you for the opportunity to participate in the care of this \par patient."\par \par \par \par FRED SHIN \par This document has been electronically signed. Sep 3 2018 3:47PM\par  \par

## 2019-08-01 ENCOUNTER — OUTPATIENT (OUTPATIENT)
Dept: OUTPATIENT SERVICES | Facility: HOSPITAL | Age: 64
LOS: 1 days | End: 2019-08-01
Payer: MEDICARE

## 2019-08-01 DIAGNOSIS — Z98.891 HISTORY OF UTERINE SCAR FROM PREVIOUS SURGERY: Chronic | ICD-10-CM

## 2019-08-01 DIAGNOSIS — Z95.5 PRESENCE OF CORONARY ANGIOPLASTY IMPLANT AND GRAFT: Chronic | ICD-10-CM

## 2019-08-01 PROCEDURE — G9001: CPT

## 2019-08-13 ENCOUNTER — APPOINTMENT (OUTPATIENT)
Dept: OBGYN | Facility: CLINIC | Age: 64
End: 2019-08-13

## 2019-08-15 ENCOUNTER — APPOINTMENT (OUTPATIENT)
Dept: ENDOCRINOLOGY | Facility: CLINIC | Age: 64
End: 2019-08-15
Payer: MEDICARE

## 2019-08-15 VITALS
SYSTOLIC BLOOD PRESSURE: 120 MMHG | HEART RATE: 62 BPM | DIASTOLIC BLOOD PRESSURE: 77 MMHG | WEIGHT: 258 LBS | BODY MASS INDEX: 40.41 KG/M2

## 2019-08-15 DIAGNOSIS — E78.5 HYPERLIPIDEMIA, UNSPECIFIED: ICD-10-CM

## 2019-08-15 DIAGNOSIS — E66.9 OBESITY, UNSPECIFIED: ICD-10-CM

## 2019-08-15 DIAGNOSIS — E11.9 TYPE 2 DIABETES MELLITUS W/OUT COMPLICATIONS: ICD-10-CM

## 2019-08-15 DIAGNOSIS — Z71.89 OTHER SPECIFIED COUNSELING: ICD-10-CM

## 2019-08-15 PROBLEM — N20.0 CALCULUS OF KIDNEY: Chronic | Status: ACTIVE | Noted: 2019-07-24

## 2019-08-15 PROBLEM — I25.10 ATHEROSCLEROTIC HEART DISEASE OF NATIVE CORONARY ARTERY WITHOUT ANGINA PECTORIS: Chronic | Status: ACTIVE | Noted: 2019-07-24

## 2019-08-15 LAB
GLUCOSE BLDC GLUCOMTR-MCNC: 121
HBA1C MFR BLD HPLC: 7.2

## 2019-08-15 PROCEDURE — 99214 OFFICE O/P EST MOD 30 MIN: CPT

## 2019-08-15 PROCEDURE — 82962 GLUCOSE BLOOD TEST: CPT

## 2019-08-15 PROCEDURE — 83036 HEMOGLOBIN GLYCOSYLATED A1C: CPT | Mod: QW

## 2019-08-15 RX ORDER — LANCETS 33 GAUGE
EACH MISCELLANEOUS
Qty: 3 | Refills: 3 | Status: ACTIVE | COMMUNITY
Start: 2019-02-01 | End: 1900-01-01

## 2019-08-15 RX ORDER — ROSUVASTATIN CALCIUM 20 MG/1
20 TABLET, FILM COATED ORAL
Qty: 90 | Refills: 3 | Status: ACTIVE | COMMUNITY
Start: 2019-04-08 | End: 1900-01-01

## 2019-08-15 RX ORDER — BLOOD SUGAR DIAGNOSTIC
STRIP MISCELLANEOUS 3 TIMES DAILY
Qty: 3 | Refills: 3 | Status: ACTIVE | COMMUNITY
Start: 2019-02-01 | End: 1900-01-01

## 2019-08-15 RX ORDER — INSULIN GLARGINE 100 [IU]/ML
100 INJECTION, SOLUTION SUBCUTANEOUS
Qty: 3 | Refills: 3 | Status: ACTIVE | COMMUNITY
Start: 2019-02-01 | End: 1900-01-01

## 2019-08-16 PROBLEM — E66.9 OBESITY: Status: ACTIVE | Noted: 2018-09-20

## 2019-08-16 PROBLEM — E78.5 HYPERLIPIDEMIA: Status: ACTIVE | Noted: 2018-09-20

## 2019-08-16 PROBLEM — E11.9 TYPE 2 DIABETES MELLITUS: Status: ACTIVE | Noted: 2018-09-20

## 2019-08-16 NOTE — ASSESSMENT
[FreeTextEntry1] : 1) DM2: controlled, A1C on 8/2019 at 7.2%. target of <7%. Natural hx of the disease and importance of treatment targets discussed at length, she verbalized understanding. ADA diet and importance of exercise discussed at length. Plan is to cont metformin to full dose and introduce GLP-1 agonist in the future. Reduce Lantus to 8 units QHS, titration instructions provided, continue to hold bolus insulin. Refer to Nutritionist today. We gaudencio check microalbumin, lipids and labs on the NV. Discuss vaccines and podiatry/opthalmology referrals on NV \par \par 2) Weight gain:  complicated by DM2. Discussed medical  strategies. Pt would like to try lifestyle modifications and GLP-1 agonist therapy in the future. Reassess on the NV for at least ~5% TBW loss.\par \par 3) Essential HTN: Pt is at goal BP and had self d/lynne Arb. reports HAs w/ CCB, advised to restart losartan and eGFR is normal. Refill sent. Reassess microalbumin prior to the NV.\par  \par 4) Dyslipidemia: Pt is not on a moderate intensity statin. stopped Atorvastatin 80 mg QDaily as she felt it gave her loose stools. r/b/a and s/e reviewed. declines dose reduction at this time. REassess lipids on the next visit. LDL target <100.\par  [Carbohydrate Consistent Diet] : carbohydrate consistent diet [Hypoglycemia Management] : hypoglycemia management [Diabetes Foot Care] : diabetes foot care [Long Term Vascular Complications] : long term vascular complications of diabetes [Importance of Diet and Exercise] : importance of diet and exercise to improve glycemic control, achieve weight loss and improve cardiovascular health [Smoking Cessation] : smoking cessation [Incretin Mimetic Therapy] : Risks and benefits of incretin mimetic therapy were discussed with the patient including nauseau, pancreatitis and potential risk of medullary thyroid cancer [FreeTextEntry2] : 15 min independent obesity counseling provided

## 2019-08-16 NOTE — HISTORY OF PRESENT ILLNESS
[FreeTextEntry1] : 63 year old female smoker w h/o sherry, hl, htn, dm, micoalbuminuria, obesity, asthma, gerd, cad s/p DERIK mRCA presents for initial metabolic evaluation.\par Generally feels well and endorses no acute complaints.\par Reports DM2 diagnosis was made many years ago by former PCP. She notes however that she was started on insulin after PCI in 9/2018. At that time, A1C was 11. She has been adherent to Lantus 12 units QHS but self d/lynne humalog 8 units. She is compliant w/ Metformin 500 mg BID, denies GI s/e.\par \par 8/2019: Here for /fu, generally feels well and endorses no acute complaints. No interval events since LV. Today reports tolerance of Crestor w/o loose stools or myalgias.  presently denies loose stools, HAs, vision changes. Reports euglycemia at home, is tolerating 4 tabs of metformin 500 mg w/o GI s/e. No hypoglycemia. Taking lantus 8 units as instructed QD\par She otherwise denies any f/c, CP, SOB, palpitations, tremors, depressed mood, anxiety, palpitations, n/v, stool/urinary abn, skin/weight changes, heat/cold intolerance, HAs, breast/nipple changes, polyuria/polydipsia/nocturia or other complaints.\par Off note, she is an active smoker w/ >50 ppy, currently smokes 4 cigs daily.

## 2019-09-18 ENCOUNTER — APPOINTMENT (OUTPATIENT)
Dept: OTOLARYNGOLOGY | Facility: CLINIC | Age: 64
End: 2019-09-18
Payer: MEDICARE

## 2019-09-18 VITALS — BODY MASS INDEX: 39.24 KG/M2 | WEIGHT: 250 LBS | HEIGHT: 67 IN

## 2019-09-18 VITALS
SYSTOLIC BLOOD PRESSURE: 143 MMHG | RESPIRATION RATE: 15 BRPM | TEMPERATURE: 97.9 F | HEART RATE: 71 BPM | OXYGEN SATURATION: 99 % | DIASTOLIC BLOOD PRESSURE: 88 MMHG

## 2019-09-18 DIAGNOSIS — Z86.39 PERSONAL HISTORY OF OTHER ENDOCRINE, NUTRITIONAL AND METABOLIC DISEASE: ICD-10-CM

## 2019-09-18 DIAGNOSIS — Z86.79 PERSONAL HISTORY OF OTHER DISEASES OF THE CIRCULATORY SYSTEM: ICD-10-CM

## 2019-09-18 DIAGNOSIS — H60.8X3 OTHER OTITIS EXTERNA, BILATERAL: ICD-10-CM

## 2019-09-18 DIAGNOSIS — H91.90 UNSPECIFIED HEARING LOSS, UNSPECIFIED EAR: ICD-10-CM

## 2019-09-18 DIAGNOSIS — Z78.9 OTHER SPECIFIED HEALTH STATUS: ICD-10-CM

## 2019-09-18 DIAGNOSIS — H90.3 SENSORINEURAL HEARING LOSS, BILATERAL: ICD-10-CM

## 2019-09-18 PROCEDURE — 92557 COMPREHENSIVE HEARING TEST: CPT

## 2019-09-18 PROCEDURE — 99203 OFFICE O/P NEW LOW 30 MIN: CPT

## 2019-09-18 PROCEDURE — 92550 TYMPANOMETRY & REFLEX THRESH: CPT

## 2019-09-18 RX ORDER — MOMETASONE FUROATE 1 MG/G
0.1 CREAM TOPICAL TWICE DAILY
Qty: 1 | Refills: 2 | Status: ACTIVE | COMMUNITY
Start: 2019-09-18 | End: 1900-01-01

## 2019-09-18 NOTE — PHYSICAL EXAM
[de-identified] : b chronic o.e. [Midline] : trachea located in midline position [Normal] : no rashes

## 2019-09-18 NOTE — HISTORY OF PRESENT ILLNESS
[de-identified] : 65 yo woman who has noticed progressive hl for years. She denies tinnitus or vertigo. feels r is worse than left. here with her daughter. she feels her hl is moderate. nonsmoker. no fh hl at a young age. she also c/o b ear "drainage" which is scant and yellowish and they itch.

## 2019-09-30 ENCOUNTER — RX RENEWAL (OUTPATIENT)
Age: 64
End: 2019-09-30

## 2019-09-30 ENCOUNTER — MEDICATION RENEWAL (OUTPATIENT)
Age: 64
End: 2019-09-30

## 2019-09-30 RX ORDER — ASPIRIN ENTERIC COATED TABLETS 81 MG 81 MG/1
81 TABLET, DELAYED RELEASE ORAL
Qty: 30 | Refills: 3 | Status: ACTIVE | COMMUNITY
Start: 1900-01-01 | End: 1900-01-01

## 2019-10-23 ENCOUNTER — APPOINTMENT (OUTPATIENT)
Dept: OBGYN | Facility: CLINIC | Age: 64
End: 2019-10-23

## 2019-11-06 ENCOUNTER — APPOINTMENT (OUTPATIENT)
Dept: NEUROLOGY | Facility: CLINIC | Age: 64
End: 2019-11-06

## 2020-01-02 ENCOUNTER — APPOINTMENT (OUTPATIENT)
Dept: NEUROLOGY | Facility: CLINIC | Age: 65
End: 2020-01-02

## 2020-01-15 ENCOUNTER — APPOINTMENT (OUTPATIENT)
Dept: ENDOCRINOLOGY | Facility: CLINIC | Age: 65
End: 2020-01-15

## 2020-01-23 ENCOUNTER — APPOINTMENT (OUTPATIENT)
Dept: HEART AND VASCULAR | Facility: CLINIC | Age: 65
End: 2020-01-23

## 2020-01-29 ENCOUNTER — RX RENEWAL (OUTPATIENT)
Age: 65
End: 2020-01-29

## 2020-01-29 RX ORDER — CLOPIDOGREL BISULFATE 75 MG/1
75 TABLET, FILM COATED ORAL
Qty: 90 | Refills: 1 | Status: ACTIVE | COMMUNITY
Start: 2020-01-29 | End: 1900-01-01

## 2020-03-26 ENCOUNTER — RX RENEWAL (OUTPATIENT)
Age: 65
End: 2020-03-26

## 2020-03-26 RX ORDER — PEN NEEDLE, DIABETIC 29 G X1/2"
32G X 4 MM NEEDLE, DISPOSABLE MISCELLANEOUS
Qty: 100 | Refills: 0 | Status: ACTIVE | COMMUNITY
Start: 2019-02-01 | End: 1900-01-01

## 2020-04-25 RX ORDER — METFORMIN ER 500 MG 500 MG/1
500 TABLET ORAL DAILY
Qty: 360 | Refills: 0 | Status: ACTIVE | COMMUNITY
Start: 2019-02-01 | End: 1900-01-01

## 2020-07-06 ENCOUNTER — RX RENEWAL (OUTPATIENT)
Age: 65
End: 2020-07-06

## 2020-11-01 ENCOUNTER — INPATIENT (INPATIENT)
Facility: HOSPITAL | Age: 65
LOS: 1 days | Discharge: HOME | DRG: 247 | End: 2020-11-03
Attending: INTERNAL MEDICINE | Admitting: INTERNAL MEDICINE
Payer: MEDICARE

## 2020-11-01 VITALS
SYSTOLIC BLOOD PRESSURE: 118 MMHG | HEIGHT: 67 IN | RESPIRATION RATE: 36 BRPM | DIASTOLIC BLOOD PRESSURE: 75 MMHG | HEART RATE: 83 BPM | TEMPERATURE: 99 F | OXYGEN SATURATION: 99 % | WEIGHT: 259.93 LBS

## 2020-11-01 DIAGNOSIS — Z95.5 PRESENCE OF CORONARY ANGIOPLASTY IMPLANT AND GRAFT: Chronic | ICD-10-CM

## 2020-11-01 DIAGNOSIS — Z98.891 HISTORY OF UTERINE SCAR FROM PREVIOUS SURGERY: Chronic | ICD-10-CM

## 2020-11-01 LAB
ALBUMIN SERPL ELPH-MCNC: 4.4 G/DL — SIGNIFICANT CHANGE UP (ref 3.3–5)
ALP SERPL-CCNC: 132 U/L — HIGH (ref 40–120)
ALT FLD-CCNC: 21 U/L — SIGNIFICANT CHANGE UP (ref 10–45)
ANION GAP SERPL CALC-SCNC: 16 MMOL/L — SIGNIFICANT CHANGE UP (ref 5–17)
APPEARANCE UR: ABNORMAL
APTT BLD: 34.8 SEC — SIGNIFICANT CHANGE UP (ref 27.5–35.5)
AST SERPL-CCNC: 18 U/L — SIGNIFICANT CHANGE UP (ref 10–40)
BACTERIA # UR AUTO: PRESENT /HPF
BASE EXCESS BLDV CALC-SCNC: 2.5 MMOL/L — SIGNIFICANT CHANGE UP
BASOPHILS # BLD AUTO: 0.06 K/UL — SIGNIFICANT CHANGE UP (ref 0–0.2)
BASOPHILS NFR BLD AUTO: 0.6 % — SIGNIFICANT CHANGE UP (ref 0–2)
BILIRUB SERPL-MCNC: 0.5 MG/DL — SIGNIFICANT CHANGE UP (ref 0.2–1.2)
BILIRUB UR-MCNC: NEGATIVE — SIGNIFICANT CHANGE UP
BUN SERPL-MCNC: 28 MG/DL — HIGH (ref 7–23)
CA-I SERPL-SCNC: 1.17 MMOL/L — SIGNIFICANT CHANGE UP (ref 1.12–1.3)
CALCIUM SERPL-MCNC: 9.6 MG/DL — SIGNIFICANT CHANGE UP (ref 8.4–10.5)
CHLORIDE SERPL-SCNC: 102 MMOL/L — SIGNIFICANT CHANGE UP (ref 96–108)
CK MB CFR SERPL CALC: 2.2 NG/ML — SIGNIFICANT CHANGE UP (ref 0–6.7)
CK SERPL-CCNC: 69 U/L — SIGNIFICANT CHANGE UP (ref 25–170)
CO2 SERPL-SCNC: 23 MMOL/L — SIGNIFICANT CHANGE UP (ref 22–31)
COLOR SPEC: YELLOW — SIGNIFICANT CHANGE UP
COMMENT - URINE: SIGNIFICANT CHANGE UP
CREAT SERPL-MCNC: 1.6 MG/DL — HIGH (ref 0.5–1.3)
DIFF PNL FLD: NEGATIVE — SIGNIFICANT CHANGE UP
EOSINOPHIL # BLD AUTO: 0.19 K/UL — SIGNIFICANT CHANGE UP (ref 0–0.5)
EOSINOPHIL NFR BLD AUTO: 2.1 % — SIGNIFICANT CHANGE UP (ref 0–6)
EPI CELLS # UR: ABNORMAL /HPF (ref 0–5)
GAS PNL BLDV: 140 MMOL/L — SIGNIFICANT CHANGE UP (ref 138–146)
GAS PNL BLDV: SIGNIFICANT CHANGE UP
GAS PNL BLDV: SIGNIFICANT CHANGE UP
GLUCOSE SERPL-MCNC: 165 MG/DL — HIGH (ref 70–99)
GLUCOSE UR QL: NEGATIVE — SIGNIFICANT CHANGE UP
GRAN CASTS # UR COMP ASSIST: ABNORMAL /LPF
HCO3 BLDV-SCNC: 27 MMOL/L — SIGNIFICANT CHANGE UP (ref 20–27)
HCT VFR BLD CALC: 46.7 % — HIGH (ref 34.5–45)
HGB BLD-MCNC: 15.3 G/DL — SIGNIFICANT CHANGE UP (ref 11.5–15.5)
IMM GRANULOCYTES NFR BLD AUTO: 0.3 % — SIGNIFICANT CHANGE UP (ref 0–1.5)
INR BLD: 1.25 — HIGH (ref 0.88–1.16)
KETONES UR-MCNC: NEGATIVE — SIGNIFICANT CHANGE UP
LACTATE SERPL-SCNC: 2.2 MMOL/L — HIGH (ref 0.5–2)
LEUKOCYTE ESTERASE UR-ACNC: NEGATIVE — SIGNIFICANT CHANGE UP
LYMPHOCYTES # BLD AUTO: 2.13 K/UL — SIGNIFICANT CHANGE UP (ref 1–3.3)
LYMPHOCYTES # BLD AUTO: 23 % — SIGNIFICANT CHANGE UP (ref 13–44)
MAGNESIUM SERPL-MCNC: 1.8 MG/DL — SIGNIFICANT CHANGE UP (ref 1.6–2.6)
MCHC RBC-ENTMCNC: 29 PG — SIGNIFICANT CHANGE UP (ref 27–34)
MCHC RBC-ENTMCNC: 32.8 GM/DL — SIGNIFICANT CHANGE UP (ref 32–36)
MCV RBC AUTO: 88.4 FL — SIGNIFICANT CHANGE UP (ref 80–100)
MONOCYTES # BLD AUTO: 0.52 K/UL — SIGNIFICANT CHANGE UP (ref 0–0.9)
MONOCYTES NFR BLD AUTO: 5.6 % — SIGNIFICANT CHANGE UP (ref 2–14)
NEUTROPHILS # BLD AUTO: 6.32 K/UL — SIGNIFICANT CHANGE UP (ref 1.8–7.4)
NEUTROPHILS NFR BLD AUTO: 68.4 % — SIGNIFICANT CHANGE UP (ref 43–77)
NITRITE UR-MCNC: NEGATIVE — SIGNIFICANT CHANGE UP
NRBC # BLD: 0 /100 WBCS — SIGNIFICANT CHANGE UP (ref 0–0)
NT-PROBNP SERPL-SCNC: 23 PG/ML — SIGNIFICANT CHANGE UP (ref 0–300)
PCO2 BLDV: 40 MMHG — LOW (ref 41–51)
PH BLDV: 7.44 — HIGH (ref 7.32–7.43)
PH UR: 6 — SIGNIFICANT CHANGE UP (ref 5–8)
PLATELET # BLD AUTO: 267 K/UL — SIGNIFICANT CHANGE UP (ref 150–400)
PO2 BLDV: 35 MMHG — SIGNIFICANT CHANGE UP
POTASSIUM BLDV-SCNC: 4 MMOL/L — SIGNIFICANT CHANGE UP (ref 3.5–4.9)
POTASSIUM SERPL-MCNC: 4 MMOL/L — SIGNIFICANT CHANGE UP (ref 3.5–5.3)
POTASSIUM SERPL-SCNC: 4 MMOL/L — SIGNIFICANT CHANGE UP (ref 3.5–5.3)
PROT SERPL-MCNC: 8.5 G/DL — HIGH (ref 6–8.3)
PROT UR-MCNC: ABNORMAL MG/DL
PROTHROM AB SERPL-ACNC: 14.8 SEC — HIGH (ref 10.6–13.6)
RBC # BLD: 5.28 M/UL — HIGH (ref 3.8–5.2)
RBC # FLD: 13.3 % — SIGNIFICANT CHANGE UP (ref 10.3–14.5)
RBC CASTS # UR COMP ASSIST: < 5 /HPF — SIGNIFICANT CHANGE UP
SAO2 % BLDV: 74 % — SIGNIFICANT CHANGE UP
SARS-COV-2 RNA SPEC QL NAA+PROBE: SIGNIFICANT CHANGE UP
SODIUM SERPL-SCNC: 141 MMOL/L — SIGNIFICANT CHANGE UP (ref 135–145)
SP GR SPEC: 1.01 — SIGNIFICANT CHANGE UP (ref 1–1.03)
TROPONIN T SERPL-MCNC: <0.01 NG/ML — SIGNIFICANT CHANGE UP (ref 0–0.01)
TROPONIN T SERPL-MCNC: <0.01 NG/ML — SIGNIFICANT CHANGE UP (ref 0–0.01)
URATE CRY FLD QL MICRO: ABNORMAL /HPF
UROBILINOGEN FLD QL: 0.2 E.U./DL — SIGNIFICANT CHANGE UP
WBC # BLD: 9.25 K/UL — SIGNIFICANT CHANGE UP (ref 3.8–10.5)
WBC # FLD AUTO: 9.25 K/UL — SIGNIFICANT CHANGE UP (ref 3.8–10.5)
WBC UR QL: < 5 /HPF — SIGNIFICANT CHANGE UP

## 2020-11-01 PROCEDURE — 71275 CT ANGIOGRAPHY CHEST: CPT | Mod: 26

## 2020-11-01 PROCEDURE — 71045 X-RAY EXAM CHEST 1 VIEW: CPT | Mod: 26

## 2020-11-01 PROCEDURE — 99291 CRITICAL CARE FIRST HOUR: CPT

## 2020-11-01 RX ORDER — MAGNESIUM SULFATE 500 MG/ML
2 VIAL (ML) INJECTION ONCE
Refills: 0 | Status: COMPLETED | OUTPATIENT
Start: 2020-11-01 | End: 2020-11-01

## 2020-11-01 RX ORDER — SODIUM CHLORIDE 9 MG/ML
1000 INJECTION INTRAMUSCULAR; INTRAVENOUS; SUBCUTANEOUS ONCE
Refills: 0 | Status: COMPLETED | OUTPATIENT
Start: 2020-11-01 | End: 2020-11-01

## 2020-11-01 RX ORDER — IPRATROPIUM/ALBUTEROL SULFATE 18-103MCG
3 AEROSOL WITH ADAPTER (GRAM) INHALATION ONCE
Refills: 0 | Status: COMPLETED | OUTPATIENT
Start: 2020-11-01 | End: 2020-11-01

## 2020-11-01 RX ADMIN — Medication 3 MILLILITER(S): at 17:03

## 2020-11-01 RX ADMIN — SODIUM CHLORIDE 1000 MILLILITER(S): 9 INJECTION INTRAMUSCULAR; INTRAVENOUS; SUBCUTANEOUS at 18:28

## 2020-11-01 RX ADMIN — Medication 80 MILLIGRAM(S): at 17:04

## 2020-11-01 RX ADMIN — Medication 50 GRAM(S): at 17:03

## 2020-11-01 NOTE — ED ADULT NURSE NOTE - OBJECTIVE STATEMENT
received A&Ox3 65years old female pt with c/o of " I have SOB for past 3 days." currently, pt reports chest tightness and SOB. pt denies any other radiating pain, headache, dizziness. no fever, v/n/d, or coughing. pt has hx of smoking multiple cigarettes a day and diabetes. pt's ekg shows sinus rhythm. received A&Ox3 65years old female pt with c/o of " I have SOB for past 3 days." pt presents with labored breathing and SOB, lost consciousness during initial assessment, came back to consciousness within 30 seconds. currently, pt denies dizziness. pt put on non-rebreather mask. pt denies any other radiating pain, headache, dizziness. no fever, v/n/d, or coughing. pt has hx of smoking multiple cigarettes a day and diabetes. pt's ekg shows sinus rhythm.

## 2020-11-01 NOTE — H&P ADULT - PROBLEM SELECTOR PLAN 3
Lipid panel ordered for AM labs   - home meds: Rosuvastatin 20 mg daily Lipid panel ordered for AM labs   - home meds: Rosuvastatin 20 mg daily  - ordered for Atorvastatin 80 mg daily (therapeutic interchange) Cr 1.60 on admission, no history of CKD reported  - continue to monitor Cr   - consult renal if needed   - avoid nephrotoxic agents

## 2020-11-01 NOTE — H&P ADULT - NSICDXPASTMEDICALHX_GEN_ALL_CORE_FT
PAST MEDICAL HISTORY:  CAD (coronary artery disease)     Diabetes     GERD (gastroesophageal reflux disease)     HLD (hyperlipidemia)     HTN (hypertension)     Kidney stones

## 2020-11-01 NOTE — H&P ADULT - NSHPLABSRESULTS_GEN_ALL_CORE
CBC Full  -  ( 01 Nov 2020 16:57 )  WBC Count : 9.25 K/uL  RBC Count : 5.28 M/uL  Hemoglobin : 15.3 g/dL  Hematocrit : 46.7 %  Platelet Count - Automated : 267 K/uL  Mean Cell Volume : 88.4 fl  Mean Cell Hemoglobin : 29.0 pg  Mean Cell Hemoglobin Concentration : 32.8 gm/dL  Auto Neutrophil # : 6.32 K/uL  Auto Lymphocyte # : 2.13 K/uL  Auto Monocyte # : 0.52 K/uL  Auto Eosinophil # : 0.19 K/uL  Auto Basophil # : 0.06 K/uL  Auto Neutrophil % : 68.4 %  Auto Lymphocyte % : 23.0 %  Auto Monocyte % : 5.6 %  Auto Eosinophil % : 2.1 %  Auto Basophil % : 0.6 %    11-01    141  |  102  |  28<H>  ----------------------------<  165<H>  4.0   |  23  |  1.60<H>    Ca    9.6      01 Nov 2020 16:57  Mg     1.8     11-01    TPro  8.5<H>  /  Alb  4.4  /  TBili  0.5  /  DBili  x   /  AST  18  /  ALT  21  /  AlkPhos  132<H>  11-01    PT/INR - ( 01 Nov 2020 16:57 )   PT: 14.8 sec;   INR: 1.25          PTT - ( 01 Nov 2020 16:57 )  PTT:34.8 sec    CARDIAC MARKERS ( 01 Nov 2020 20:32 )  x     / <0.01 ng/mL / x     / x     / x      CARDIAC MARKERS ( 01 Nov 2020 16:57 )  x     / <0.01 ng/mL / 69 U/L / x     / 2.2 ng/mL      COVID-19 PCR: NotDetec (01 Nov 2020 16:57)

## 2020-11-01 NOTE — ED PROVIDER NOTE - OBJECTIVE STATEMENT
64 y/o F, morbidly obese, with PMHx of DM2, HTN, HLD, CAD with stents, and kidney stones here w/ 2 days SOB. Pt upgraded as in resp distress in triage. As pt getting undressed and IV placement, pt lost conciousness while talking to me, woke up approx 1 min later, no confusion. no seizure like activity and pt at baseline immediately, stating "oh my god I am so dizzy". was not yet on monitor when incident happened

## 2020-11-01 NOTE — ED ADULT NURSE REASSESSMENT NOTE - NS ED NURSE REASSESS COMMENT FT1
upon arrival to bed, pt had syncopal episode lasting several seconds. Pt woke up and is A and O x 3, no injuries noted.

## 2020-11-01 NOTE — H&P ADULT - PROBLEM SELECTOR PLAN 7
Home medication: Protonix 40 mg daily  - continue home med       VTE PPX: Heparin subQ   Dispo: pending further workup/evaluation

## 2020-11-01 NOTE — H&P ADULT - PROBLEM SELECTOR PLAN 2
Experienced witnessed syncopal event in ED, recovered after about 1 minute, denies recalling episode but woke up alert and oriented   - denies prior episodes   - not on monitor in ED during episode   - continue to monitor tele   - Echocardiogram ordered   - NPO for possible ischemic evaluation

## 2020-11-01 NOTE — ED ADULT NURSE REASSESSMENT NOTE - NS ED NURSE REASSESS COMMENT FT1
pt reports " my chest tightness is gone, I feel so much better." pt put off non-rebreather. put on O2 nasal canula. current O2 is 98%. pt breathing appears non-labored, talks in full sentences. pt brought to CT scan. will continue to monitor.

## 2020-11-01 NOTE — H&P ADULT - ASSESSMENT
64 y/o obese female, current smoker, w/ PMHx HLD, type 2 DM, NSTEMI/CAD (prior PCI 09/2018 at [facility], DERIK mid RCA), IBS, GERD, and history of kidney stones who presented to [facility] ED c/o dizziness and SOB x 2 days associated w/ chest tightness. Patient reports she is only able to ambulate 1 block before experiencing these symptoms and also endorses using her prescribed sublingual nitroglycerin today w/ some relief of symptoms prior to arrival to ED. As per ED signout, patient was upgraded initially due to increased respiratory effort and then experienced a witnessed brief syncopal event in ED which she recovered from quickly. Patient was not on the monitor when syncopal event occurred. Patient denies prior syncopal episodes, abdominal pain, nausea, vomiting, melena, LE edema, fever, chills, cough, and known sick contacts/recent travel. In ED, VS initially showed temp 98.7 F, HR 83 bpm, /75 mmHg, 36 RR, and SPO2 99% on room air. EKG in ED showed ***** . Labs upon arrival significant for BUN/Cr 28/1.60, Glucose 165, INR 1.25, troponin negative x 1, BNP within normal limits, Mag 1.8, and Lactate 2.2. COVID PCR negative. UA showed cloudy appearance, present bacteria, and negative nitrites/leukocytes. CTA PE showed main pulmonary artery dilation (at 3.4 cm) consistent w/ some pulmonary arterial hypertension and negative for PE. In ED, patient was given 1L NS, Duoneb x 1, Mag 2 g IV x 1, and Solumedrol 80 mg IV x 1. Patient is now admitted to cardiology/telemetry for further management of R/O ACS and syncopal workup.  64 y/o obese female, current smoker, w/ PMHx HLD, type 2 DM, NSTEMI/CAD (prior PCI 09/2018 at [facility], DERIK mid RCA), IBS, GERD, and history of kidney stones who presented to [facility] ED c/o dizziness and SOB x 2 days associated w/ chest tightness. Patient reports she is only able to ambulate 1 block before experiencing these symptoms and also endorses using her prescribed sublingual nitroglycerin today w/ some relief of symptoms prior to arrival to ED. As per ED signout, patient was upgraded initially due to increased respiratory effort and then experienced a witnessed brief syncopal event in ED which she recovered from quickly. Patient was not on the monitor when syncopal event occurred. Patient denies prior syncopal episodes, abdominal pain, nausea, vomiting, melena, LE edema, fever, chills, cough, and known sick contacts/recent travel. In ED, VS initially showed temp 98.7 F, HR 83 bpm, /75 mmHg, 36 RR, and SPO2 99% on room air. EKG in ED showed ***** . Labs upon arrival significant for BUN/Cr 28/1.60, Glucose 165, INR 1.25, troponin negative x 2, BNP within normal limits, Mag 1.8, and Lactate 2.2. COVID PCR negative. UA showed cloudy appearance, present bacteria, and negative nitrites/leukocytes. CTA PE showed main pulmonary artery dilation (at 3.4 cm) consistent w/ some pulmonary arterial hypertension and negative for PE. In ED, patient was given 1L NS, Duoneb x 1, Mag 2 g IV x 1, and Solumedrol 80 mg IV x 1. Patient is now admitted to cardiology/telemetry for further management of R/O ACS and syncopal workup.  64 y/o obese female, current smoker, w/ PMHx HLD, type 2 DM, NSTEMI/CAD (prior PCI 09/2018 at [facility], DERIK mid RCA), IBS, GERD, and history of kidney stones who presented to [facility] ED c/o dizziness and SOB x 2 days associated w/ chest tightness. Patient reports she is only able to ambulate 1 block before experiencing these symptoms and also endorses using her prescribed sublingual nitroglycerin today w/ some relief of symptoms prior to arrival to ED. As per ED signout, patient was upgraded initially due to increased respiratory effort and then experienced a witnessed brief syncopal event in ED which she recovered from quickly. Patient was not on the monitor when syncopal event occurred. Patient denies prior syncopal episodes, abdominal pain, nausea, vomiting, melena, LE edema, fever, chills, cough, and known sick contacts/recent travel. In ED, VS initially showed temp 98.7 F, HR 83 bpm, /75 mmHg, 36 RR, and SPO2 99% on room air. EKG in ED showed NSR at 67 bpm w/o acute ischemic changes. Labs upon arrival significant for BUN/Cr 28/1.60, Glucose 165, INR 1.25, troponin negative x 2, BNP within normal limits, Mag 1.8, and Lactate 2.2. COVID PCR negative. UA showed cloudy appearance, present bacteria, and negative nitrites/leukocytes. CTA PE showed main pulmonary artery dilation (at 3.4 cm) consistent w/ some pulmonary arterial hypertension and negative for PE. In ED, patient was given 1L NS, Duoneb x 1, Mag 2 g IV x 1, and Solumedrol 80 mg IV x 1. Patient is now admitted to cardiology/telemetry for further management of R/O ACS and syncopal workup.

## 2020-11-01 NOTE — H&P ADULT - PROBLEM SELECTOR PLAN 4
A1c ordered for AM labs   - home medications: Metformin 2000 mg daily   - holding home oral medications   - ISS ordered Lipid panel ordered for AM labs   - home meds: Rosuvastatin 20 mg daily  - ordered for Atorvastatin 80 mg daily (therapeutic interchange)

## 2020-11-01 NOTE — ED PROVIDER NOTE - PHYSICAL EXAMINATION
CONSTITUTIONAL: Well-appearing; well-nourished; in no apparent distress.   HEAD: Normocephalic; atraumatic.   EYES:  conjunctiva and sclera clear  ENT: normal nose; no rhinorrhea; normal pharynx with no erythema or lesions.   NECK: Supple; non-tender;   CARDIOVASCULAR: Normal S1, S2; no murmurs, rubs, or gallops. Regular rate and rhythm.   RESPIRATORY: increased rate and effort, poor air movement, no obvious wheezes or crackles  GI: Soft; non-distended; non-tender; no palpable organomegaly.   EXT: No cyanosis or edema; N/V intact  SKIN: Normal for age and race; warm; dry; good turgor; no apparent lesions or rash.   NEURO: A & O x 3; face symmetric; grossly unremarkable.   PSYCHOLOGICAL: The patient’s mood and manner are appropriate.

## 2020-11-01 NOTE — ED PROVIDER NOTE - CLINICAL SUMMARY MEDICAL DECISION MAKING FREE TEXT BOX
here w/ severe SOB that seems out of proportion (pt oxygen 100% on RA), and then witnessed syncope in the ED. concern for poss new onset copd given poor air movement and active smoking so meds given while awaiting labs, but will need PE r/o. if neg for PE - plan for cardiac workup

## 2020-11-01 NOTE — H&P ADULT - HISTORY OF PRESENT ILLNESS
66 y/o obese female, current smoker, w/ PMHx HLD, type 2 DM, NSTEMI/CAD (prior PCI 09/2018 at [facility], DERIK mid RCA), IBS, GERD, and history of kidney stones who presented to [facility] ED c/o dizziness and SOB x 2 days associated w/ chest tightness. Patient reports she is able to walk one block prior to experiencing these symptoms. Patient also states that she used her prescribed sublingual nitroglycerin x 2 today prior to presenting to the hospital and reports it did relieve her symptoms but they recurred. As per ED providers, patient was initially upgraded in care in the ED due to increased respiratory effort and patient was placed on supplemental oxygen immediately. Furthermore, ED provider stated that patient had a syncopal episode while getting settled in the ED w/ LOC but no fall or head trauma, and patient recovered approximately one minute later and was alert and oriented x 3. Patient reports she does not recall the incident. Patient was not on monitor in ED when event occurred, and no telemetry events noted since being placed on the monitor. Patient denies prior syncopal episodes, abdominal pain, nausea, vomiting, melena, LE edema, fever, chills, cough, and known sick contacts/recent travel.  In ED, VS initially showed temp 98.7 F, HR 83 bpm, /75 mmHg, 36 RR, and SPO2 99% on room air. EKG in ED showed ***** . Labs upon arrival significant for BUN/Cr 28/1.60, Glucose 165, INR 1.25, troponin negative x 1, BNP within normal limits, Mag 1.8, and Lactate 2.2. COVID PCR negative. UA showed cloudy appearance, present bacteria, and negative nitrites/leukocytes. CTA PE showed main pulmonary artery dilation (at 3.4 cm) consistent w/ some pulmonary arterial hypertension and negative for PE. In ED, patient was given 1L NS, Duoneb x 1, Mag 2 g IV x 1, and Solumedrol 80 mg IV x 1. Patient is now admitted to cardiology/telemetry for further management of R/O ACS and syncopal workup.     Cardiac cath (09/2018): DERIK x 1 mid RCA, LM normal, mid LAD 50%, prox LCx 30%, EF 50%. 64 y/o obese female, current smoker, w/ PMHx HLD, type 2 DM, NSTEMI/CAD (prior PCI 09/2018 at [facility], DERIK mid RCA), IBS, GERD, and history of kidney stones who presented to [facility] ED c/o dizziness and SOB x 2 days associated w/ chest tightness. Patient reports she is able to walk one block prior to experiencing these symptoms. Patient also states that she used her prescribed sublingual nitroglycerin x 2 today prior to presenting to the hospital and reports it did relieve her symptoms but they recurred. As per ED providers, patient was initially upgraded in care in the ED due to increased respiratory effort and patient was placed on supplemental oxygen immediately. Furthermore, ED provider stated that patient had a syncopal episode while getting settled in the ED w/ LOC but no fall or head trauma, and patient recovered approximately one minute later and was alert and oriented x 3. Patient reports she does not recall the incident. Patient was not on monitor in ED when event occurred, and no telemetry events noted since being placed on the monitor. Patient denies prior syncopal episodes, abdominal pain, nausea, vomiting, melena, LE edema, fever, chills, cough, and known sick contacts/recent travel.  In ED, VS initially showed temp 98.7 F, HR 83 bpm, /75 mmHg, 36 RR, and SPO2 99% on room air. EKG in ED showed ***** . Labs upon arrival significant for BUN/Cr 28/1.60, Glucose 165, INR 1.25, troponin negative x 1, BNP within normal limits, Mag 1.8, and Lactate 2.2. COVID PCR negative. UA showed cloudy appearance, present bacteria, and negative nitrites/leukocytes. CTA PE showed main pulmonary artery dilation (at 3.4 cm) consistent w/ some pulmonary arterial hypertension and negative for PE. In ED, patient was given 1L NS, Duoneb x 1, Mag 2 g IV x 1, and Solumedrol 80 mg IV x 1. Patient is now admitted to cardiology/telemetry for further management of R/O ACS and syncopal workup.     Cardiac cath (09/2018): DERIK x 1 mid RCA, LM normal, mid LAD 50%, prox LCx 30%, EF 50%.  64 y/o obese female, current smoker, w/ PMHx HLD, type 2 DM, NSTEMI/CAD (prior PCI 09/2018 at [facility], DERIK mid RCA), IBS, GERD, and history of kidney stones who presented to [facility] ED c/o dizziness and SOB x 2 days associated w/ chest tightness. Patient reports she is able to walk one block prior to experiencing these symptoms. Patient also states that she used her prescribed sublingual nitroglycerin x 2 today prior to presenting to the hospital and reports it did relieve her symptoms but they recurred. As per ED providers, patient was initially upgraded in care in the ED due to increased respiratory effort and patient was placed on supplemental oxygen immediately. Furthermore, ED provider stated that patient had a syncopal episode while getting settled in the ED w/ LOC but no fall or head trauma, and patient recovered approximately one minute later and was alert and oriented x 3. Patient reports she does not recall the incident. Patient was not on monitor in ED when event occurred, and no telemetry events noted since being placed on the monitor. Patient denies prior syncopal episodes, abdominal pain, nausea, vomiting, melena, LE edema, fever, chills, cough, and known sick contacts/recent travel.  In ED, VS initially showed temp 98.7 F, HR 83 bpm, /75 mmHg, 36 RR, and SPO2 99% on room air. EKG in ED showed NSR at 67 bpm w/o acute ischemic changes. Labs upon arrival significant for BUN/Cr 28/1.60, Glucose 165, INR 1.25, troponin negative x 1, BNP within normal limits, Mag 1.8, and Lactate 2.2. COVID PCR negative. UA showed cloudy appearance, present bacteria, and negative nitrites/leukocytes. CTA PE showed main pulmonary artery dilation (at 3.4 cm) consistent w/ some pulmonary arterial hypertension and negative for PE. In ED, patient was given 1L NS, Duoneb x 1, Mag 2 g IV x 1, and Solumedrol 80 mg IV x 1. Patient is now admitted to cardiology/telemetry for further management of R/O ACS and syncopal workup.     Cardiac cath (09/2018): DERIK x 1 mid RCA, LM normal, mid LAD 50%, prox LCx 30%, EF 50%.

## 2020-11-01 NOTE — ED ADULT NURSE REASSESSMENT NOTE - NS ED NURSE REASSESS COMMENT FT1
Report from RN Siddiqui, Pt awake, alert, no complaints of SOB or chest pain at this time, States " I feel better and want to go home and go to my outpatient cardiologist" MD made aware.

## 2020-11-01 NOTE — H&P ADULT - NSHPSOCIALHISTORY_GEN_ALL_CORE
Patient states she has a glass of wine "every once in a while". Patient reports she is a current smoker, endorses smoking 5-6 cigarettes/day when she is stressed and 2-3 cigarettes/day when she is not stressed, and states she has been smoking since she was 13 years old. Patient denies any other illicit drug use.

## 2020-11-01 NOTE — H&P ADULT - PROBLEM SELECTOR PLAN 5
Currently denying any abdominal symptoms   - home medications: Dicyclomine 10 mg BID, Ondansetron 4 mg TID (for 7 days)  - continue home Dicyclomine   - Ondansetron ordered PRN Currently denying any abdominal symptoms   - home medications: Dicyclomine 10 mg BID, Ondansetron 4 mg TID (for 7 days)  - continue home Dicyclomine   - order Ondansetron as needed while admitted A1c ordered for AM labs   - home medications: Metformin 2000 mg daily   - holding home oral medications   - ISS ordered

## 2020-11-01 NOTE — H&P ADULT - PROBLEM SELECTOR PLAN 6
Home medication: Protonix 40 mg daily  - continue home med       VTE PPX:   Dispo: pending further workup/evaluation Home medication: Protonix 40 mg daily  - continue home med       VTE PPX: Heparin subQ   Dispo: pending further workup/evaluation Currently denying any abdominal symptoms   - home medications: Dicyclomine 10 mg BID, Ondansetron 4 mg TID (for 7 days)  - continue home Dicyclomine   - order Ondansetron as needed while admitted

## 2020-11-01 NOTE — H&P ADULT - PROBLEM SELECTOR PLAN 1
Currently chest pain free; Presented w/ dizziness, SOB, and chest tightness x 2 days w/ ambulation of 1 block  - patient reports she used her SL nitro twice on 11/01 prior to arrival to hospital   - patient experienced witnessed syncopal event in ED, recovered, alert and oriented, was not on monitor during episode  - EKG showed ***  - troponin negative x 2, 3rd cardiac enzymes ordered for AM labs   - last echo noted in [place] from 09/2018, EF 65%, within normal limits   - cardiac cath 09/2018 DERIK x 1 mid RCA  - continue Aspirin 81 mg daily and Plavix 75 mg daily   - Echocardiogram ordered   - NPO for possible ischemic evaluation Currently chest pain free; Presented w/ dizziness, SOB, and chest tightness x 2 days w/ ambulation of 1 block  - patient reports she used her SL nitro twice on 11/01 prior to arrival to hospital   - patient experienced witnessed syncopal event in ED, recovered, alert and oriented, was not on monitor during episode  - EKG showed NSR at 67 bpm, no ischemic changes  - troponin negative x 2, 3rd cardiac enzymes ordered for AM labs   - last echo noted in [place] from 09/2018, EF 65%, within normal limits   - cardiac cath 09/2018 DERIK x 1 mid RCA  - continue Aspirin 81 mg daily and Plavix 75 mg daily   - Echocardiogram ordered   - NPO for possible ischemic evaluation

## 2020-11-01 NOTE — ED ADULT TRIAGE NOTE - CHIEF COMPLAINT QUOTE
pt presents to ED with SOB x 2 days. Denies fever. Pt is tachypnic, speaking in one word sentences, direct to room and upgraded. Placed immediately on o2

## 2020-11-02 DIAGNOSIS — N28.9 DISORDER OF KIDNEY AND URETER, UNSPECIFIED: ICD-10-CM

## 2020-11-02 DIAGNOSIS — I20.0 UNSTABLE ANGINA: ICD-10-CM

## 2020-11-02 DIAGNOSIS — K58.9 IRRITABLE BOWEL SYNDROME WITHOUT DIARRHEA: ICD-10-CM

## 2020-11-02 DIAGNOSIS — E78.5 HYPERLIPIDEMIA, UNSPECIFIED: ICD-10-CM

## 2020-11-02 DIAGNOSIS — E11.65 TYPE 2 DIABETES MELLITUS WITH HYPERGLYCEMIA: ICD-10-CM

## 2020-11-02 DIAGNOSIS — I10 ESSENTIAL (PRIMARY) HYPERTENSION: ICD-10-CM

## 2020-11-02 DIAGNOSIS — E11.9 TYPE 2 DIABETES MELLITUS WITHOUT COMPLICATIONS: ICD-10-CM

## 2020-11-02 DIAGNOSIS — R79.89 OTHER SPECIFIED ABNORMAL FINDINGS OF BLOOD CHEMISTRY: ICD-10-CM

## 2020-11-02 DIAGNOSIS — R55 SYNCOPE AND COLLAPSE: ICD-10-CM

## 2020-11-02 DIAGNOSIS — N17.9 ACUTE KIDNEY FAILURE, UNSPECIFIED: ICD-10-CM

## 2020-11-02 DIAGNOSIS — F41.8 OTHER SPECIFIED ANXIETY DISORDERS: ICD-10-CM

## 2020-11-02 DIAGNOSIS — K21.9 GASTRO-ESOPHAGEAL REFLUX DISEASE WITHOUT ESOPHAGITIS: ICD-10-CM

## 2020-11-02 LAB
A1C WITH ESTIMATED AVERAGE GLUCOSE RESULT: 9.4 % — HIGH (ref 4–5.6)
ALBUMIN SERPL ELPH-MCNC: 3.8 G/DL — SIGNIFICANT CHANGE UP (ref 3.3–5)
ALP SERPL-CCNC: 118 U/L — SIGNIFICANT CHANGE UP (ref 40–120)
ALT FLD-CCNC: 17 U/L — SIGNIFICANT CHANGE UP (ref 10–45)
ANION GAP SERPL CALC-SCNC: 13 MMOL/L — SIGNIFICANT CHANGE UP (ref 5–17)
ANION GAP SERPL CALC-SCNC: 14 MMOL/L — SIGNIFICANT CHANGE UP (ref 5–17)
APTT BLD: 32.1 SEC — SIGNIFICANT CHANGE UP (ref 27.5–35.5)
AST SERPL-CCNC: 13 U/L — SIGNIFICANT CHANGE UP (ref 10–40)
BASOPHILS # BLD AUTO: 0.01 K/UL — SIGNIFICANT CHANGE UP (ref 0–0.2)
BASOPHILS NFR BLD AUTO: 0.2 % — SIGNIFICANT CHANGE UP (ref 0–2)
BILIRUB SERPL-MCNC: 0.2 MG/DL — SIGNIFICANT CHANGE UP (ref 0.2–1.2)
BUN SERPL-MCNC: 35 MG/DL — HIGH (ref 7–23)
BUN SERPL-MCNC: 36 MG/DL — HIGH (ref 7–23)
CALCIUM SERPL-MCNC: 9.3 MG/DL — SIGNIFICANT CHANGE UP (ref 8.4–10.5)
CALCIUM SERPL-MCNC: 9.7 MG/DL — SIGNIFICANT CHANGE UP (ref 8.4–10.5)
CHLORIDE SERPL-SCNC: 101 MMOL/L — SIGNIFICANT CHANGE UP (ref 96–108)
CHLORIDE SERPL-SCNC: 102 MMOL/L — SIGNIFICANT CHANGE UP (ref 96–108)
CHOLEST SERPL-MCNC: 141 MG/DL — SIGNIFICANT CHANGE UP
CK MB CFR SERPL CALC: 2.8 NG/ML — SIGNIFICANT CHANGE UP (ref 0–6.7)
CK SERPL-CCNC: 85 U/L — SIGNIFICANT CHANGE UP (ref 25–170)
CO2 SERPL-SCNC: 21 MMOL/L — LOW (ref 22–31)
CO2 SERPL-SCNC: 24 MMOL/L — SIGNIFICANT CHANGE UP (ref 22–31)
CREAT SERPL-MCNC: 1.32 MG/DL — HIGH (ref 0.5–1.3)
CREAT SERPL-MCNC: 1.65 MG/DL — HIGH (ref 0.5–1.3)
EOSINOPHIL # BLD AUTO: 0 K/UL — SIGNIFICANT CHANGE UP (ref 0–0.5)
EOSINOPHIL NFR BLD AUTO: 0 % — SIGNIFICANT CHANGE UP (ref 0–6)
ESTIMATED AVERAGE GLUCOSE: 223 MG/DL — HIGH (ref 68–114)
GLUCOSE BLDC GLUCOMTR-MCNC: 121 MG/DL — HIGH (ref 70–99)
GLUCOSE BLDC GLUCOMTR-MCNC: 135 MG/DL — HIGH (ref 70–99)
GLUCOSE BLDC GLUCOMTR-MCNC: 170 MG/DL — HIGH (ref 70–99)
GLUCOSE BLDC GLUCOMTR-MCNC: 237 MG/DL — HIGH (ref 70–99)
GLUCOSE BLDC GLUCOMTR-MCNC: 356 MG/DL — HIGH (ref 70–99)
GLUCOSE SERPL-MCNC: 192 MG/DL — HIGH (ref 70–99)
GLUCOSE SERPL-MCNC: 467 MG/DL — CRITICAL HIGH (ref 70–99)
HCT VFR BLD CALC: 40.8 % — SIGNIFICANT CHANGE UP (ref 34.5–45)
HCV AB S/CO SERPL IA: 0.08 S/CO — SIGNIFICANT CHANGE UP
HCV AB SERPL-IMP: SIGNIFICANT CHANGE UP
HDLC SERPL-MCNC: 45 MG/DL — LOW
HGB BLD-MCNC: 13.5 G/DL — SIGNIFICANT CHANGE UP (ref 11.5–15.5)
IMM GRANULOCYTES NFR BLD AUTO: 0.5 % — SIGNIFICANT CHANGE UP (ref 0–1.5)
INR BLD: 1.19 — HIGH (ref 0.88–1.16)
LACTATE SERPL-SCNC: 1.7 MMOL/L — SIGNIFICANT CHANGE UP (ref 0.5–2)
LACTATE SERPL-SCNC: 2.6 MMOL/L — HIGH (ref 0.5–2)
LACTATE SERPL-SCNC: 4 MMOL/L — CRITICAL HIGH (ref 0.5–2)
LIPID PNL WITH DIRECT LDL SERPL: 77 MG/DL — SIGNIFICANT CHANGE UP
LYMPHOCYTES # BLD AUTO: 0.71 K/UL — LOW (ref 1–3.3)
LYMPHOCYTES # BLD AUTO: 11.1 % — LOW (ref 13–44)
MAGNESIUM SERPL-MCNC: 2.3 MG/DL — SIGNIFICANT CHANGE UP (ref 1.6–2.6)
MCHC RBC-ENTMCNC: 29.3 PG — SIGNIFICANT CHANGE UP (ref 27–34)
MCHC RBC-ENTMCNC: 33.1 GM/DL — SIGNIFICANT CHANGE UP (ref 32–36)
MCV RBC AUTO: 88.7 FL — SIGNIFICANT CHANGE UP (ref 80–100)
MONOCYTES # BLD AUTO: 0.14 K/UL — SIGNIFICANT CHANGE UP (ref 0–0.9)
MONOCYTES NFR BLD AUTO: 2.2 % — SIGNIFICANT CHANGE UP (ref 2–14)
NEUTROPHILS # BLD AUTO: 5.48 K/UL — SIGNIFICANT CHANGE UP (ref 1.8–7.4)
NEUTROPHILS NFR BLD AUTO: 86 % — HIGH (ref 43–77)
NON HDL CHOLESTEROL: 96 MG/DL — SIGNIFICANT CHANGE UP
NRBC # BLD: 0 /100 WBCS — SIGNIFICANT CHANGE UP (ref 0–0)
PLATELET # BLD AUTO: 221 K/UL — SIGNIFICANT CHANGE UP (ref 150–400)
POTASSIUM SERPL-MCNC: 4.1 MMOL/L — SIGNIFICANT CHANGE UP (ref 3.5–5.3)
POTASSIUM SERPL-MCNC: 4.8 MMOL/L — SIGNIFICANT CHANGE UP (ref 3.5–5.3)
POTASSIUM SERPL-SCNC: 4.1 MMOL/L — SIGNIFICANT CHANGE UP (ref 3.5–5.3)
POTASSIUM SERPL-SCNC: 4.8 MMOL/L — SIGNIFICANT CHANGE UP (ref 3.5–5.3)
PROT SERPL-MCNC: 7 G/DL — SIGNIFICANT CHANGE UP (ref 6–8.3)
PROTHROM AB SERPL-ACNC: 14.2 SEC — HIGH (ref 10.6–13.6)
RBC # BLD: 4.6 M/UL — SIGNIFICANT CHANGE UP (ref 3.8–5.2)
RBC # FLD: 13.2 % — SIGNIFICANT CHANGE UP (ref 10.3–14.5)
SODIUM SERPL-SCNC: 136 MMOL/L — SIGNIFICANT CHANGE UP (ref 135–145)
SODIUM SERPL-SCNC: 139 MMOL/L — SIGNIFICANT CHANGE UP (ref 135–145)
TRIGL SERPL-MCNC: 94 MG/DL — SIGNIFICANT CHANGE UP
TROPONIN T SERPL-MCNC: <0.01 NG/ML — SIGNIFICANT CHANGE UP (ref 0–0.01)
WBC # BLD: 6.37 K/UL — SIGNIFICANT CHANGE UP (ref 3.8–10.5)
WBC # FLD AUTO: 6.37 K/UL — SIGNIFICANT CHANGE UP (ref 3.8–10.5)

## 2020-11-02 PROCEDURE — 93458 L HRT ARTERY/VENTRICLE ANGIO: CPT | Mod: 26,59

## 2020-11-02 PROCEDURE — 99223 1ST HOSP IP/OBS HIGH 75: CPT

## 2020-11-02 PROCEDURE — 92928 PRQ TCAT PLMT NTRAC ST 1 LES: CPT | Mod: LC,59

## 2020-11-02 PROCEDURE — 99232 SBSQ HOSP IP/OBS MODERATE 35: CPT

## 2020-11-02 PROCEDURE — 93306 TTE W/DOPPLER COMPLETE: CPT | Mod: 26

## 2020-11-02 PROCEDURE — 93571 IV DOP VEL&/PRESS C FLO 1ST: CPT | Mod: 26,LD

## 2020-11-02 RX ORDER — AMLODIPINE BESYLATE 2.5 MG/1
5 TABLET ORAL EVERY 24 HOURS
Refills: 0 | Status: DISCONTINUED | OUTPATIENT
Start: 2020-11-02 | End: 2020-11-03

## 2020-11-02 RX ORDER — SODIUM CHLORIDE 9 MG/ML
500 INJECTION, SOLUTION INTRAVENOUS
Refills: 0 | Status: DISCONTINUED | OUTPATIENT
Start: 2020-11-02 | End: 2020-11-02

## 2020-11-02 RX ORDER — ASPIRIN/CALCIUM CARB/MAGNESIUM 324 MG
81 TABLET ORAL DAILY
Refills: 0 | Status: DISCONTINUED | OUTPATIENT
Start: 2020-11-02 | End: 2020-11-03

## 2020-11-02 RX ORDER — DEXTROSE 50 % IN WATER 50 %
15 SYRINGE (ML) INTRAVENOUS ONCE
Refills: 0 | Status: DISCONTINUED | OUTPATIENT
Start: 2020-11-02 | End: 2020-11-03

## 2020-11-02 RX ORDER — CARVEDILOL PHOSPHATE 80 MG/1
6.25 CAPSULE, EXTENDED RELEASE ORAL EVERY 12 HOURS
Refills: 0 | Status: DISCONTINUED | OUTPATIENT
Start: 2020-11-02 | End: 2020-11-03

## 2020-11-02 RX ORDER — ACETAMINOPHEN 500 MG
650 TABLET ORAL EVERY 6 HOURS
Refills: 0 | Status: DISCONTINUED | OUTPATIENT
Start: 2020-11-02 | End: 2020-11-03

## 2020-11-02 RX ORDER — METOPROLOL TARTRATE 50 MG
25 TABLET ORAL DAILY
Refills: 0 | Status: DISCONTINUED | OUTPATIENT
Start: 2020-11-02 | End: 2020-11-02

## 2020-11-02 RX ORDER — PANTOPRAZOLE SODIUM 20 MG/1
40 TABLET, DELAYED RELEASE ORAL
Refills: 0 | Status: DISCONTINUED | OUTPATIENT
Start: 2020-11-02 | End: 2020-11-03

## 2020-11-02 RX ORDER — INSULIN LISPRO 100/ML
VIAL (ML) SUBCUTANEOUS
Refills: 0 | Status: DISCONTINUED | OUTPATIENT
Start: 2020-11-02 | End: 2020-11-03

## 2020-11-02 RX ORDER — GLUCAGON INJECTION, SOLUTION 0.5 MG/.1ML
1 INJECTION, SOLUTION SUBCUTANEOUS ONCE
Refills: 0 | Status: DISCONTINUED | OUTPATIENT
Start: 2020-11-02 | End: 2020-11-03

## 2020-11-02 RX ORDER — DEXTROSE 50 % IN WATER 50 %
25 SYRINGE (ML) INTRAVENOUS ONCE
Refills: 0 | Status: DISCONTINUED | OUTPATIENT
Start: 2020-11-02 | End: 2020-11-03

## 2020-11-02 RX ORDER — NITROGLYCERIN 6.5 MG
0.4 CAPSULE, EXTENDED RELEASE ORAL
Refills: 0 | Status: DISCONTINUED | OUTPATIENT
Start: 2020-11-02 | End: 2020-11-03

## 2020-11-02 RX ORDER — INSULIN LISPRO 100/ML
14 VIAL (ML) SUBCUTANEOUS
Refills: 0 | Status: DISCONTINUED | OUTPATIENT
Start: 2020-11-02 | End: 2020-11-03

## 2020-11-02 RX ORDER — ATORVASTATIN CALCIUM 80 MG/1
80 TABLET, FILM COATED ORAL AT BEDTIME
Refills: 0 | Status: DISCONTINUED | OUTPATIENT
Start: 2020-11-02 | End: 2020-11-03

## 2020-11-02 RX ORDER — DEXTROSE 50 % IN WATER 50 %
12.5 SYRINGE (ML) INTRAVENOUS ONCE
Refills: 0 | Status: DISCONTINUED | OUTPATIENT
Start: 2020-11-02 | End: 2020-11-03

## 2020-11-02 RX ORDER — HUMAN INSULIN 100 [IU]/ML
20 INJECTION, SUSPENSION SUBCUTANEOUS ONCE
Refills: 0 | Status: COMPLETED | OUTPATIENT
Start: 2020-11-02 | End: 2020-11-02

## 2020-11-02 RX ORDER — CLOPIDOGREL BISULFATE 75 MG/1
75 TABLET, FILM COATED ORAL DAILY
Refills: 0 | Status: DISCONTINUED | OUTPATIENT
Start: 2020-11-02 | End: 2020-11-03

## 2020-11-02 RX ORDER — HEPARIN SODIUM 5000 [USP'U]/ML
7500 INJECTION INTRAVENOUS; SUBCUTANEOUS EVERY 8 HOURS
Refills: 0 | Status: DISCONTINUED | OUTPATIENT
Start: 2020-11-02 | End: 2020-11-03

## 2020-11-02 RX ORDER — METFORMIN HYDROCHLORIDE 850 MG/1
1 TABLET ORAL
Qty: 0 | Refills: 0 | DISCHARGE

## 2020-11-02 RX ORDER — SODIUM CHLORIDE 9 MG/ML
1000 INJECTION, SOLUTION INTRAVENOUS
Refills: 0 | Status: DISCONTINUED | OUTPATIENT
Start: 2020-11-02 | End: 2020-11-03

## 2020-11-02 RX ORDER — SODIUM CHLORIDE 9 MG/ML
500 INJECTION INTRAMUSCULAR; INTRAVENOUS; SUBCUTANEOUS
Refills: 0 | Status: DISCONTINUED | OUTPATIENT
Start: 2020-11-02 | End: 2020-11-03

## 2020-11-02 RX ORDER — INSULIN GLARGINE 100 [IU]/ML
36 INJECTION, SOLUTION SUBCUTANEOUS AT BEDTIME
Refills: 0 | Status: DISCONTINUED | OUTPATIENT
Start: 2020-11-02 | End: 2020-11-03

## 2020-11-02 RX ORDER — METFORMIN HYDROCHLORIDE 850 MG/1
4 TABLET ORAL
Qty: 0 | Refills: 0 | DISCHARGE

## 2020-11-02 RX ADMIN — HEPARIN SODIUM 7500 UNIT(S): 5000 INJECTION INTRAVENOUS; SUBCUTANEOUS at 05:40

## 2020-11-02 RX ADMIN — Medication 650 MILLIGRAM(S): at 16:22

## 2020-11-02 RX ADMIN — HEPARIN SODIUM 7500 UNIT(S): 5000 INJECTION INTRAVENOUS; SUBCUTANEOUS at 23:50

## 2020-11-02 RX ADMIN — HUMAN INSULIN 20 UNIT(S): 100 INJECTION, SUSPENSION SUBCUTANEOUS at 09:07

## 2020-11-02 RX ADMIN — INSULIN GLARGINE 36 UNIT(S): 100 INJECTION, SOLUTION SUBCUTANEOUS at 23:50

## 2020-11-02 RX ADMIN — PANTOPRAZOLE SODIUM 40 MILLIGRAM(S): 20 TABLET, DELAYED RELEASE ORAL at 05:40

## 2020-11-02 RX ADMIN — AMLODIPINE BESYLATE 5 MILLIGRAM(S): 2.5 TABLET ORAL at 16:22

## 2020-11-02 RX ADMIN — CARVEDILOL PHOSPHATE 6.25 MILLIGRAM(S): 80 CAPSULE, EXTENDED RELEASE ORAL at 17:03

## 2020-11-02 RX ADMIN — Medication 25 MILLIGRAM(S): at 11:24

## 2020-11-02 RX ADMIN — Medication 4: at 11:39

## 2020-11-02 RX ADMIN — Medication 1 MILLIGRAM(S): at 15:08

## 2020-11-02 RX ADMIN — Medication 12: at 06:55

## 2020-11-02 RX ADMIN — SODIUM CHLORIDE 75 MILLILITER(S): 9 INJECTION, SOLUTION INTRAVENOUS at 10:33

## 2020-11-02 RX ADMIN — HEPARIN SODIUM 7500 UNIT(S): 5000 INJECTION INTRAVENOUS; SUBCUTANEOUS at 15:07

## 2020-11-02 RX ADMIN — CLOPIDOGREL BISULFATE 75 MILLIGRAM(S): 75 TABLET, FILM COATED ORAL at 11:23

## 2020-11-02 RX ADMIN — Medication 81 MILLIGRAM(S): at 11:23

## 2020-11-02 RX ADMIN — Medication 2: at 15:54

## 2020-11-02 RX ADMIN — ATORVASTATIN CALCIUM 80 MILLIGRAM(S): 80 TABLET, FILM COATED ORAL at 23:50

## 2020-11-02 NOTE — PROGRESS NOTE ADULT - ASSESSMENT
66 y/o obese female, current smoker, w/ PMHx HTN, HLD, uncontrolled type 2 DM (A1c 9.4%), NATHAN (on CPAP), NSTEMI/CAD (prior PCI 09/2018 at [facility] with DERIK mid RCA), IBS, GERD who presented to [facility] ED c/o dizziness and SOB with exertion associated with chest pressure x2 days consistent with anginal equivalent, trop negative x3, EKG non-ischemic, admitted to [facility] for unstable angina with plan for cardiac catheterization with Dr. Patel today 11/2/20. 64 y/o obese female, current smoker, w/ PMHx HTN, HLD, uncontrolled type 2 DM (A1c 9.4%), NATHAN (on CPAP), NSTEMI/CAD (prior PCI 09/2018 at [facility] with DERIK mid RCA), IBS, GERD who presented to [facility] ED c/o dizziness and SOB with exertion associated with chest pressure x2 days consistent with anginal equivalent, trop negative x3, EKG non-ischemic, admitted to [facility] for unstable angina with plan for cardiac catheterization with Dr. Patel today 11/2/20, with hospital course also significant for elevated lactate (4.0), hyperglycemia (FS 300s-400s), possible KIKA (Cr. 1.6), and syncopal episode in the ER.

## 2020-11-02 NOTE — PROGRESS NOTE ADULT - PROBLEM SELECTOR PLAN 3
-Cr 1.6 on admission, most recent Cr 06/2019 was 0.9; no clear etiology based on history, Potential etiologies include pre-renal vs. ATN (supported by presence of granular casts on UA) vs post-renal   -f/u urine lytes, repeat BMP at 2pm  -Renal sono ordered  -monitor I/Os  -LR 75cc/hr x6h pre-cath -Cr 1.6 on admission, most recent Cr 01/31/2020 was 1.2; now improved to 1.3 with IVF  -f/u urine lytes, renal sono  -monitor I/Os  -LR 75cc/hr x6h pre-cath

## 2020-11-02 NOTE — PROGRESS NOTE ADULT - PROBLEM SELECTOR PLAN 5
Experienced witnessed syncopal event in ED, recovered after about 1 minute, denies recalling episode but woke up alert and oriented   - denies prior episodes   - not on monitor in ED during episode   - continue to monitor tele   - Echocardiogram ordered   - NPO for possible ischemic evaluation -witnessed syncopal event in ED as pt was getting undressed and IV placement, had LOC while speaking to ER MD, woke up approx 1 min later, no confusion, no seizure like activity and pt at baseline immediately. Not on cardiac monitor in ED during episode. Possibly vasovagal  -continue to monitor tele   -Echocardiogram ordered   -orthostatics negative -witnessed syncopal event in ED as pt was getting undressed and IV placement, had LOC while speaking to ER MD, woke up approx 1 min later, no confusion, no seizure like activity and pt at baseline immediately. Not on cardiac monitor in ED during episode. Possibly vasovagal  -continue to monitor tele   -Echocardiogram normal as above  -orthostatics negative

## 2020-11-02 NOTE — CONSULT NOTE ADULT - SUBJECTIVE AND OBJECTIVE BOX
HPI:  66 y/o obese female, current smoker, w/ PMHx HLD, type 2 DM, NSTEMI/CAD (prior PCI 2018 at [facility], DERIK mid RCA), IBS, GERD, and history of kidney stones who presented to [facility] ED c/o dizziness and SOB x 2 days associated w/ chest tightness. Patient reports she is able to walk one block prior to experiencing these symptoms. Patient also states that she used her prescribed sublingual nitroglycerin x 2 today prior to presenting to the hospital and reports it did relieve her symptoms but they recurred. As per ED providers, patient was initially upgraded in care in the ED due to increased respiratory effort and patient was placed on supplemental oxygen immediately. Furthermore, ED provider stated that patient had a syncopal episode while getting settled in the ED w/ LOC but no fall or head trauma, and patient recovered approximately one minute later and was alert and oriented x 3. Patient reports she does not recall the incident. Patient was not on monitor in ED when event occurred, and no telemetry events noted since being placed on the monitor. Patient denies prior syncopal episodes, abdominal pain, nausea, vomiting, melena, LE edema, fever, chills, cough, and known sick contacts/recent travel.  In ED, VS initially showed temp 98.7 F, HR 83 bpm, /75 mmHg, 36 RR, and SPO2 99% on room air. EKG in ED showed NSR at 67 bpm w/o acute ischemic changes. Labs upon arrival significant for BUN/Cr 28/1.60, Glucose 165, INR 1.25, troponin negative x 1, BNP within normal limits, Mag 1.8, and Lactate 2.2. COVID PCR negative. UA showed cloudy appearance, present bacteria, and negative nitrites/leukocytes. CTA PE showed main pulmonary artery dilation (at 3.4 cm) consistent w/ some pulmonary arterial hypertension and negative for PE. In ED, patient was given 1L NS, Duoneb x 1, Mag 2 g IV x 1, and Solumedrol 80 mg IV x 1. Patient is now admitted to cardiology/telemetry for further management of R/O ACS and syncopal workup.     Cardiac cath (2018): DERIK x 1 mid RCA, LM normal, mid LAD 50%, prox LCx 30%, EF 50%.  (2020 23:44)    Pt reports inconsistent insulin use of lantus 8 units at home, occasional metformin.  Per daughter little to moderate adherence to low carb diet, minimal physical activity, inconsistent home FSG monitoring.   Pt planned for cardiac cath today  Pt with severe hyperlgycemia this morning, was given 20 units NPH insulin and started on sliding scale.     PMH & Surgical Hx:SYNCOPE  Kidney stones  CAD (coronary artery disease)  HLD (hyperlipidemia)  GERD (gastroesophageal reflux disease)  HTN (hypertension)  Diabetes  Kidney stones  Depression with anxiety  Hypertension  Renal insufficiency  Type 2 diabetes mellitus with hyperglycemia  Unstable angina  GERD (gastroesophageal reflux disease)  IBS (irritable bowel syndrome)  H/O  section    FH:  DM: in mom, pre-DM in daughter  Thyroid: denies  Autoimmune: denies  Other:    SH:  Smoking: active smoker  Etoh:social  Recreational Drugs: denies  Social Life: lives w , family    Current Meds:  acetaminophen   Tablet .. 650 milliGRAM(s) Oral every 6 hours PRN  amLODIPine   Tablet 5 milliGRAM(s) Oral every 24 hours  aspirin enteric coated 81 milliGRAM(s) Oral daily  atorvastatin 80 milliGRAM(s) Oral at bedtime  carvedilol 6.25 milliGRAM(s) Oral every 12 hours  clopidogrel Tablet 75 milliGRAM(s) Oral daily  dextrose 40% Gel 15 Gram(s) Oral once PRN  dextrose 5%. 1000 milliLiter(s) IV Continuous <Continuous>  dextrose 50% Injectable 12.5 Gram(s) IV Push once  dextrose 50% Injectable 25 Gram(s) IV Push once  dextrose 50% Injectable 25 Gram(s) IV Push once  dicyclomine 10 milliGRAM(s) Oral two times a day before meals  glucagon  Injectable 1 milliGRAM(s) IntraMuscular once PRN  heparin   Injectable 7500 Unit(s) SubCutaneous every 8 hours  insulin glargine Injectable (LANTUS) 36 Unit(s) SubCutaneous at bedtime  insulin lispro (ADMELOG) corrective regimen sliding scale   SubCutaneous Before meals and at bedtime  insulin lispro Injectable (ADMELOG) 14 Unit(s) SubCutaneous three times a day before meals  nitroglycerin     SubLingual 0.4 milliGRAM(s) SubLingual every 5 minutes PRN  pantoprazole    Tablet 40 milliGRAM(s) Oral before breakfast  sodium chloride 0.9%. 500 milliLiter(s) IV Continuous <Continuous>      Allergies:  Celebrex (Short breath)      ROS:  Denies the following except as indicated.    General: weight loss/weight gain, decreased appetite, fatigue  Eyes: Blurry vision, double vision, visual changes  ENT: Throat pain, changes in voice,   CV: palpitations, SOB, CP, cough  GI: NVD, difficulty swallowing, abdominal pain  : polyuria, dysuria  Endo: abnormal menses, temperature intolerance, decreased libido  MSK: weakness, joint pain  Skin: rash, dryness, diaphoresis  Heme: Easy bruising,bleeding  Neuro: HA, dizziness, lightheadedness, numbness tingling  Psych: Anxiety, Depression    Vital Signs Last 24 Hrs  T(C): 36.7 (2020 18:00), Max: 37.1 (2020 23:44)  T(F): 98 (2020 18:00), Max: 98.8 (2020 04:48)  HR: 54 (2020 17:06) (54 - 81)  BP: 166/80 (2020 17:06) (140/78 - 186/86)  BP(mean): 98 (2020 23:44) (98 - 98)  RR: 17 (2020 16:32) (17 - 20)  SpO2: 94% (2020 16:32) (93% - 98%)  Height (cm): 170.2 ( @ 23:44)  Weight (kg): 117.9 ( @ 23:44)  BMI (kg/m2): 40.7 ( @ 23:44)    Constitutional: wn/wd in NAD.   HEENT: NCAT, MMM, OP clear, EOMI, , no proptosis or lid retraction  Neck: no thyromegaly or palpable thyroid nodules   Respiratory: lungs CTAB.  Cardiovascular: regular rhythm, normal S1 and S2, no audible murmurs  GI: soft, NT/ND, no masses/HSM appreciated.  Neurology: no tremors, DTR 2+  Skin: no visible rashes/lesions  Psychiatric: AAO x 3, normal affect/mood.  Ext: radial pulses intact, DP pulses intact, extremities warm, no cyanosis, clubbing or edema.       LABS:                        13.5   6.37  )-----------( 221      ( 2020 06:55 )             40.8         139  |  102  |  35<H>  ----------------------------<  192<H>  4.1   |  24  |  1.32<H>    Ca    9.7      2020 15:36  Mg     2.3         TPro  7.0  /  Alb  3.8  /  TBili  0.2  /  DBili  x   /  AST  13  /  ALT  17  /  AlkPhos  118  11-02    PT/INR - ( 2020 15:36 )   PT: 14.2 sec;   INR: 1.19          PTT - ( 2020 15:36 )  PTT:32.1 sec  Urinalysis Basic - ( 2020 19:06 )    Color: Yellow / Appearance: SL Cloudy / S.010 / pH: x  Gluc: x / Ketone: NEGATIVE  / Bili: Negative / Urobili: 0.2 E.U./dL   Blood: x / Protein: Trace mg/dL / Nitrite: NEGATIVE   Leuk Esterase: NEGATIVE / RBC: < 5 /HPF / WBC < 5 /HPF   Sq Epi: x / Non Sq Epi: Moderate /HPF / Bacteria: Present /HPF            Cholesterol, Serum: 141 mg/dL (20 @ 06:55)  HDL Cholesterol, Serum: 45 mg/dL (20 @ 06:55)  Triglycerides, Serum: 94 mg/dL (20 @ 06:55)  LDL Cholesterol Calculated: 77 mg/dL (20 @ 06:55)        A1C with Estimated Average Glucose Result: 9.4 ( @ 06:55)    CAPILLARY BLOOD GLUCOSE      POCT Blood Glucose.: 121 mg/dL (2020 21:59)  POCT Blood Glucose.: 170 mg/dL (2020 15:49)  POCT Blood Glucose.: 237 mg/dL (2020 11:26)  POCT Blood Glucose.: 356 mg/dL (2020 08:38)  POCT Blood Glucose.: 412 mg/dL (2020 06:53)

## 2020-11-02 NOTE — PROVIDER CONTACT NOTE (CRITICAL VALUE NOTIFICATION) - RECOMMENDATIONS
Latest fingerstick was 412 and pt received 12 untis of insulin. POCT to be rechecked at 0800
any interventions

## 2020-11-02 NOTE — PROGRESS NOTE ADULT - PROBLEM SELECTOR PLAN 6
Lipid panel ordered for AM labs   - home meds: Rosuvastatin 20 mg daily  - ordered for Atorvastatin 80 mg daily (therapeutic interchange) -SBP 140s-150s  -home regimen: losartan/HCTZ 100/12.5mg daily (holding due to possible KIKA)  -added Toprol XL 25mg daily (increase as tolerated) -pt reports multiple stressors at home, including caring for her unwell spouse. She speaks with a psychologist weekly  -currently anxious and stressed, but no SI and pt does not wish to see a psychiatrist this admission  -ativan 1mg PO given for pre-procedural anxiety

## 2020-11-02 NOTE — PROGRESS NOTE ADULT - PROBLEM SELECTOR PLAN 4
-lactate 2.2 --> 4.0 --> 2.6; normal anion gap (r/o DKA); venous pH 7.45 in ER  -no evidence of hypoperfusion/shock (no hypovolemia, heart failure, or sepsis); more likely type B lactic acidosis in setting of metformin use  -holding metformin (f/u endo recs)  -LR 75cc/hr  -continue to trend (next at 2pm) -lactate 2.2 --> 4.0 --> 2.6 -->1.7; normal anion gap (r/o DKA); venous pH 7.45 in ER  -no evidence of hypoperfusion/shock (no hypovolemia, heart failure, or sepsis); more likely type B lactic acidosis in setting of metformin use. Downtrending with IV fluids  -holding metformin  -LR 75cc/hr

## 2020-11-02 NOTE — PROGRESS NOTE ADULT - PROBLEM SELECTOR PLAN 2
-Hgb A1c 9.4%, FS 300s-400s on 11/2 AM  -home regimen: metformin 1000mg BID, lantus 8 units QHS  -Endocrine (Dr. Ponce) consulted, f/u recs   -s/p NPH 20units on 11/2 AM  -moderate ISS, lantus, nutritional -Hgb A1c 9.4%, FS 300s-400s on 11/2 AM  -home regimen: metformin 1000mg BID, lantus 8 units QHS  -Endocrine (Dr. Ponce) consulted, appreciate recs  -s/p NPH 20units on 11/2 AM  -lantus 36 units QHS, lispro 14 units TID w/meals, and moderate ISS

## 2020-11-02 NOTE — CONSULT NOTE ADULT - ATTENDING COMMENTS
A/P 65yFemale with hx of DM presenting for management of syncope, chest pain, with uncontrolled DM    1.  DM: type 2, uncontrolled, complicated with CKD and CAD  start lantus 36 units at bedtime, lispro 14 units tid with meals (weight based regimen)  Continue lispro moderate dose scale with meals and at bedtime.   Continue consistent carb diet  FSG Goal 100-180    2.  Hyperlipidemia - goal LDL < 70  continue statin    3.  Obesity - outpatient medical management.    will consider GLP-1 agonist    Pt is advised to follow up with me at discharge by calling .      Viridiana Bee MD, PhD  Endocrinology  [address]  [place] [address]  [phone] Tel  [phone] Fax  [email or web address]
CKD with KIKA in setting of decreased intake , possible ACS  no sign CHF  cont IVf pre angio-- can dc IVF after angio protocol  follow renal fxn

## 2020-11-02 NOTE — PROGRESS NOTE ADULT - PROBLEM SELECTOR PLAN 8
-LDL 77  -continue atorvastatin 80mg daily (on Rosuvastatin 20 mg daily at home)    Dispo- pending cardiac cath, anticipate d/c 11/3  PT eval ordered  Follow up with outpatient cardiologist- Dr. Gisela Oviedo d/w Dr. Patel

## 2020-11-02 NOTE — PROGRESS NOTE ADULT - PROBLEM SELECTOR PLAN 1
-Known Hx CAD s/p DERIK mRCA 2018  -Presented with exertional CP/SOB, currently without anginal symptoms  -Trop neg x3, EKG NSR without ischemic changes  -telemetry monitoring  -f/u Echo  -NPO for cath with Dr Patel; consent obtained and placed in chart  -pt reports daily compliance with aspirin/plavix   -continue atorvastatin 80mg QHS  -Added Toprol XL 25mg daily    -ASA III, Mallampati IV  -suitable candidate for moderate sedation    Risks & benefits of procedure and alternative therapy have been explained to the patient including but not limited to: allergic reaction, bleeding w/possible need for blood transfusion, infection, renal and vascular compromise, limb damage, arrhythmia, stroke, vessel dissection/perforation, Myocardial infarction, emergent CABG. -Known Hx CAD s/p DERIK mRCA 2018  -Presented with exertional CP/SOB, currently without anginal symptoms  -Trop neg x3, EKG NSR without ischemic changes  -telemetry monitoring  -TTE 11/2- normal biventricular size/function, EF 65%, no WMA, no significant valvular disease  -NPO for cath with Dr Patel; consent obtained and placed in chart  -pt reports daily compliance with aspirin/plavix   -continue atorvastatin 80mg QHS  -Added Toprol XL 25mg daily    -ASA III, Mallampati IV  -suitable candidate for moderate sedation    Risks & benefits of procedure and alternative therapy have been explained to the patient including but not limited to: allergic reaction, bleeding w/possible need for blood transfusion, infection, renal and vascular compromise, limb damage, arrhythmia, stroke, vessel dissection/perforation, Myocardial infarction, emergent CABG. -Known Hx CAD s/p DERIK mRCA 2018  -Presented with exertional CP/SOB, currently without anginal symptoms  -Trop neg x3, EKG NSR without ischemic changes  -telemetry monitoring  -TTE 11/2- normal biventricular size/function, EF 65%, no WMA, no significant valvular disease  -NPO for cath with Dr Patel; consent obtained and placed in chart  -pt reports daily compliance with aspirin/plavix   -continue atorvastatin 80mg QHS  -Added coreg 6.25mg BID     -ASA III, Mallampati IV  -suitable candidate for moderate sedation    Risks & benefits of procedure and alternative therapy have been explained to the patient including but not limited to: allergic reaction, bleeding w/possible need for blood transfusion, infection, renal and vascular compromise, limb damage, arrhythmia, stroke, vessel dissection/perforation, Myocardial infarction, emergent CABG.

## 2020-11-02 NOTE — PROGRESS NOTE ADULT - SUBJECTIVE AND OBJECTIVE BOX
OVERNIGHT EVENTS: admitted    SUBJECTIVE / INTERVAL HPI: Patient seen and examined at bedside. Comfortable, currently denies CP, SOB, dizziness, or palpitations. No fevers/chills, cough, dysuria, abd pain, diarrhea, or URI symptoms.     VITAL SIGNS:  Vital Signs Last 24 Hrs  T(C): 36.1 (02 Nov 2020 09:48), Max: 37.1 (01 Nov 2020 16:50)  T(F): 97 (02 Nov 2020 09:48), Max: 98.8 (02 Nov 2020 04:48)  HR: 60 (02 Nov 2020 11:51) (60 - 83)  BP: 152/68 (02 Nov 2020 11:51) (106/61 - 178/92)  BP(mean): 98 (01 Nov 2020 23:44) (98 - 98)  RR: 18 (02 Nov 2020 11:51) (18 - 36)  SpO2: 95% (02 Nov 2020 11:51) (94% - 99%)      I&O's Summary    Height (cm): 170.2 (11-01 @ 23:44)  Weight (kg): 117.9 (11-01 @ 23:44)  BMI (kg/m2): 40.7 (11-01 @ 23:44)  BSA (m2): 2.26 (11-01 @ 23:44)    PHYSICAL EXAM:  General: sitting up in bed, NAD  HEENT:  PERRL, conjunctiva clear; MMM  Neck: supple, no JVD  Cardiovascular: RRR, no murmurs  Respiratory: CTA B/L  Gastrointestinal: soft, NT/ND, +BS  Extremities: WWP, no edema or cyanosis  Vascular: carotid 2+ b/l, no bruits; radial 2+ b/l; femoral 2+ b/l no bruits; DP/PT 2+ b/l  Neurological: AAOx3, no focal deficits    MEDICATIONS:  MEDICATIONS  (STANDING):  aspirin enteric coated 81 milliGRAM(s) Oral daily  atorvastatin 80 milliGRAM(s) Oral at bedtime  clopidogrel Tablet 75 milliGRAM(s) Oral daily  dextrose 5%. 1000 milliLiter(s) (50 mL/Hr) IV Continuous <Continuous>  dextrose 50% Injectable 12.5 Gram(s) IV Push once  dextrose 50% Injectable 25 Gram(s) IV Push once  dextrose 50% Injectable 25 Gram(s) IV Push once  dicyclomine 10 milliGRAM(s) Oral two times a day before meals  heparin   Injectable 7500 Unit(s) SubCutaneous every 8 hours  insulin lispro (ADMELOG) corrective regimen sliding scale   SubCutaneous Before meals and at bedtime  lactated ringers. 500 milliLiter(s) (75 mL/Hr) IV Continuous <Continuous>  metoprolol succinate ER 25 milliGRAM(s) Oral daily  pantoprazole    Tablet 40 milliGRAM(s) Oral before breakfast    MEDICATIONS  (PRN):  dextrose 40% Gel 15 Gram(s) Oral once PRN Blood Glucose LESS THAN 70 milliGRAM(s)/deciliter  glucagon  Injectable 1 milliGRAM(s) IntraMuscular once PRN Glucose LESS THAN 70 milligrams/deciliter  nitroglycerin     SubLingual 0.4 milliGRAM(s) SubLingual every 5 minutes PRN Chest Pain      LABS:                        13.5   6.37  )-----------( 221      ( 02 Nov 2020 06:55 )             40.8       11-02    136  |  101  |  36<H>  ----------------------------<  467<HH>  4.8   |  21<L>  |  1.65<H>    Ca    9.3      02 Nov 2020 06:55  Mg     2.3     11-02    TPro  7.0  /  Alb  3.8  /  TBili  0.2  /  DBili  x   /  AST  13  /  ALT  17  /  AlkPhos  118  11-02      PT/INR - ( 01 Nov 2020 16:57 )   PT: 14.8 sec;   INR: 1.25          PTT - ( 01 Nov 2020 16:57 )  PTT:34.8 sec    CARDIAC MARKERS ( 02 Nov 2020 06:55 )  x     / <0.01 ng/mL / 85 U/L / x     / 2.8 ng/mL  CARDIAC MARKERS ( 01 Nov 2020 20:32 )  x     / <0.01 ng/mL / x     / x     / x      CARDIAC MARKERS ( 01 Nov 2020 16:57 )  x     / <0.01 ng/mL / 69 U/L / x     / 2.2 ng/mL        TELEMETRY: SR    EKG 11/1/20- NSR, no acute ischemia    RADIOLOGY & ADDITIONAL TESTS: Reviewed.

## 2020-11-02 NOTE — CONSULT NOTE ADULT - SUBJECTIVE AND OBJECTIVE BOX
HPI:  Patient is a 65 year old woman with DM, CAD (NSTEMI s/p PCI 2018), stable angina, GERD, and nephrolithiasis present with SOB x 2 days associated w/ chest tightness.  Patient states that she used her prescribed sublingual nitroglycerin x 2 did not temporarily improved her symptoms. ED provider stated that patient had a syncopal episode while getting settled in the ED w/ LOC but no fall or head trauma, and patient recovered approximately one minute later and was alert and oriented x 3. Patient reports she does not recall the incident. Patient was not on monitor in ED when event occurred, and no telemetry events noted since being placed on the monitor. Patient denies prior syncopal episodes, abdominal pain, nausea, vomiting, melena, LE edema, fever, chills, cough, and known sick contacts/recent travel.    In ED, VS initially showed temp 98.7 F, HR 83 bpm, /75 mmHg, 36 RR, and SPO2 99% on room air. EKG in ED showed NSR at 67 bpm w/o acute ischemic changes. Labs upon arrival significant for BUN/Cr 28/1.60, Glucose 165, INR 1.25, troponin negative x 1, BNP within normal limits, Mag 1.8, and Lactate 2.2. COVID PCR negative. UA showed cloudy appearance, present bacteria, and negative nitrites/leukocytes. CTA PE showed main pulmonary artery dilation (at 3.4 cm) consistent w/ some pulmonary arterial hypertension and negative for PE. In ED, patient was given 1L NS, Duoneb x 1, Mag 2 g IV x 1, and Solumedrol 80 mg IV x 1. Patient is now admitted to cardiology/telemetry for further management of R/O ACS and syncopal workup.     Nephrology consulted for KIKA.    PAST MEDICAL & SURGICAL HISTORY:  As per HPI.    Allergies:  Celebrex (Short breath)    Home Medications:   aspirin enteric coated 81 milliGRAM(s) Oral daily  atorvastatin 80 milliGRAM(s) Oral at bedtime  clopidogrel Tablet 75 milliGRAM(s) Oral daily  dextrose 40% Gel 15 Gram(s) Oral once PRN  dextrose 5%. 1000 milliLiter(s) IV Continuous <Continuous>  dextrose 50% Injectable 12.5 Gram(s) IV Push once  dextrose 50% Injectable 25 Gram(s) IV Push once  dextrose 50% Injectable 25 Gram(s) IV Push once  dicyclomine 10 milliGRAM(s) Oral two times a day before meals  glucagon  Injectable 1 milliGRAM(s) IntraMuscular once PRN  heparin   Injectable 7500 Unit(s) SubCutaneous every 8 hours  insulin lispro (ADMELOG) corrective regimen sliding scale   SubCutaneous Before meals and at bedtime  lactated ringers. 500 milliLiter(s) IV Continuous <Continuous>  metoprolol succinate ER 25 milliGRAM(s) Oral daily  nitroglycerin     SubLingual 0.4 milliGRAM(s) SubLingual every 5 minutes PRN  pantoprazole    Tablet 40 milliGRAM(s) Oral before breakfast    Hospital Medications:   MEDICATIONS  (STANDING):  aspirin enteric coated 81 milliGRAM(s) Oral daily  atorvastatin 80 milliGRAM(s) Oral at bedtime  clopidogrel Tablet 75 milliGRAM(s) Oral daily  dextrose 5%. 1000 milliLiter(s) (50 mL/Hr) IV Continuous <Continuous>  dextrose 50% Injectable 12.5 Gram(s) IV Push once  dextrose 50% Injectable 25 Gram(s) IV Push once  dextrose 50% Injectable 25 Gram(s) IV Push once  dicyclomine 10 milliGRAM(s) Oral two times a day before meals  heparin   Injectable 7500 Unit(s) SubCutaneous every 8 hours  insulin lispro (ADMELOG) corrective regimen sliding scale   SubCutaneous Before meals and at bedtime  lactated ringers. 500 milliLiter(s) (75 mL/Hr) IV Continuous <Continuous>  metoprolol succinate ER 25 milliGRAM(s) Oral daily  pantoprazole    Tablet 40 milliGRAM(s) Oral before breakfast    FAMILY HISTORY:  No pertinent family history in first degree relatives    VITALS:  T(F): 97 (20 @ 09:48), Max: 98.8 (20 @ 04:48)  HR: 60 (20 @ 11:51)  BP: 152/68 (20 @ 11:51)  RR: 18 (20 @ 11:51)  SpO2: 95% (20 @ 11:51)  Wt(kg): --    Height (cm): 170.2 ( @ 23:44)  Weight (kg): 117.9 ( @ 23:44)  BMI (kg/m2): 40.7 ( @ 23:44)  BSA (m2): 2.26 ( @ 23:44)  CAPILLARY BLOOD GLUCOSE    POCT Blood Glucose.: 237 mg/dL (2020 11:26)  POCT Blood Glucose.: 356 mg/dL (2020 08:38)  POCT Blood Glucose.: 412 mg/dL (2020 06:53)  POCT Blood Glucose.: 159 mg/dL (2020 16:46)    Review of Systems:  CONSTITUTIONAL: No fever or chills  RESPIRATORY: +shortness of breath  CARDIOVASCULAR: +chest pain  GASTROINTESTINAL: No abdominal pain, nausea, vomiting, diarrhea  GENITOURINARY: No urinary frequency, gross hematuria, dysuria  SKIN: No rash or skin lesion  MUSCULOSKELETAL: No swelling    PHYSICAL EXAM:  GENERAL: Alert, awake, oriented x3, no acute distress  CHEST/LUNG: Bilateral clear breath sounds  HEART: Regular rate and rhythm  ABDOMEN: Soft, nontender, non distended  EXTREMITIES: no pedal edema  Neurology: no gross focal neurological deficit    LABS:      136  |  101  |  36<H>  ----------------------------<  467<HH>  4.8   |  21<L>  |  1.65<H>    Ca    9.3      2020 06:55  Mg     2.3         TPro  7.0  /  Alb  3.8  /  TBili  0.2  /  DBili      /  AST  13  /  ALT  17  /  AlkPhos  118  1102    Creatinine Trend: 1.65 <--, 1.60 <--                        13.5   6.37  )-----------( 221      ( 2020 06:55 )             40.8     Urine Studies:  Urinalysis Basic - ( 2020 19:06 )    Color: Yellow / Appearance: SL Cloudy / S.010 / pH:   Gluc:  / Ketone: NEGATIVE  / Bili: Negative / Urobili: 0.2 E.U./dL   Blood:  / Protein: Trace mg/dL / Nitrite: NEGATIVE   Leuk Esterase: NEGATIVE / RBC: < 5 /HPF / WBC < 5 /HPF   Sq Epi:  / Non Sq Epi: Moderate /HPF / Bacteria: Present /HPF                Assessment/Plan:   65F with DM, CAD, and GERD admitted for R/O ACS and syncope work-up, found to have KIKA (Cr 1.65). Nephrology consulted for KIKA pending contrast studies.      #KIKA   -Unknown baseline creatinine, presumably normal   -No clear etiology based on history   -Potential etiologies include pre-renal vs. ATN (supported by presence of granular casts on UA) vs post-renal   -Recommend NS @ 75cc/hr for 6hr pre- and post-contrast studies to reduce risk for RENATE  -Trend BID BMP + urine electrolytes  -Renal sono     Thank you for the opportunity to participate in the care of your patient. The nephrology service remains available to assist with any questions or concerns. Please feel free to reach us by paging the on-call nephrology fellow for urgent issues or as below.     Chinedu Katz M.D.   PGY-4, Nephrology Fellow   C: [phone]   P: [phone] HPI:  Patient is a 65 year old woman with CKD baseline Cr 1.2, DM, CAD (NSTEMI s/p PCI 2018), stable angina, GERD, and nephrolithiasis present with SOB x 2 days associated w/ chest tightness.  Patient states that she used her prescribed sublingual nitroglycerin x 2 did not temporarily improved her symptoms. ED provider stated that patient had a syncopal episode while getting settled in the ED w/ LOC but no fall or head trauma, and patient recovered approximately one minute later and was alert and oriented x 3. Patient reports she does not recall the incident. Patient was not on monitor in ED when event occurred, and no telemetry events noted since being placed on the monitor. Patient denies prior syncopal episodes, abdominal pain, nausea, vomiting, melena, LE edema, fever, chills, cough, and known sick contacts/recent travel.    In ED, VS initially showed temp 98.7 F, HR 83 bpm, /75 mmHg, 36 RR, and SPO2 99% on room air. EKG in ED showed NSR at 67 bpm w/o acute ischemic changes. Labs upon arrival significant for BUN/Cr 28/1.60, Glucose 165, INR 1.25, troponin negative x 1, BNP within normal limits, Mag 1.8, and Lactate 2.2. COVID PCR negative. UA showed cloudy appearance, present bacteria, and negative nitrites/leukocytes. CTA PE showed main pulmonary artery dilation (at 3.4 cm) consistent w/ some pulmonary arterial hypertension and negative for PE. In ED, patient was given 1L NS, Duoneb x 1, Mag 2 g IV x 1, and Solumedrol 80 mg IV x 1. Patient is now admitted to cardiology/telemetry for further management of R/O ACS and syncopal workup.     Nephrology consulted for KIKA.    PAST MEDICAL & SURGICAL HISTORY:  As per HPI.    Allergies:  Celebrex (Short breath)    Home Medications:   aspirin enteric coated 81 milliGRAM(s) Oral daily  atorvastatin 80 milliGRAM(s) Oral at bedtime  clopidogrel Tablet 75 milliGRAM(s) Oral daily  dextrose 40% Gel 15 Gram(s) Oral once PRN  dextrose 5%. 1000 milliLiter(s) IV Continuous <Continuous>  dextrose 50% Injectable 12.5 Gram(s) IV Push once  dextrose 50% Injectable 25 Gram(s) IV Push once  dextrose 50% Injectable 25 Gram(s) IV Push once  dicyclomine 10 milliGRAM(s) Oral two times a day before meals  glucagon  Injectable 1 milliGRAM(s) IntraMuscular once PRN  heparin   Injectable 7500 Unit(s) SubCutaneous every 8 hours  insulin lispro (ADMELOG) corrective regimen sliding scale   SubCutaneous Before meals and at bedtime  lactated ringers. 500 milliLiter(s) IV Continuous <Continuous>  metoprolol succinate ER 25 milliGRAM(s) Oral daily  nitroglycerin     SubLingual 0.4 milliGRAM(s) SubLingual every 5 minutes PRN  pantoprazole    Tablet 40 milliGRAM(s) Oral before breakfast    Hospital Medications:   MEDICATIONS  (STANDING):  aspirin enteric coated 81 milliGRAM(s) Oral daily  atorvastatin 80 milliGRAM(s) Oral at bedtime  clopidogrel Tablet 75 milliGRAM(s) Oral daily  dextrose 5%. 1000 milliLiter(s) (50 mL/Hr) IV Continuous <Continuous>  dextrose 50% Injectable 12.5 Gram(s) IV Push once  dextrose 50% Injectable 25 Gram(s) IV Push once  dextrose 50% Injectable 25 Gram(s) IV Push once  dicyclomine 10 milliGRAM(s) Oral two times a day before meals  heparin   Injectable 7500 Unit(s) SubCutaneous every 8 hours  insulin lispro (ADMELOG) corrective regimen sliding scale   SubCutaneous Before meals and at bedtime  lactated ringers. 500 milliLiter(s) (75 mL/Hr) IV Continuous <Continuous>  metoprolol succinate ER 25 milliGRAM(s) Oral daily  pantoprazole    Tablet 40 milliGRAM(s) Oral before breakfast    FAMILY HISTORY:  No pertinent family history in first degree relatives    VITALS:  T(F): 97 (20 @ 09:48), Max: 98.8 (20 @ 04:48)  HR: 60 (20 @ 11:51)  BP: 152/68 (20 @ 11:51)  RR: 18 (20 @ 11:51)  SpO2: 95% (20 @ 11:51)  Wt(kg): --    Height (cm): 170.2 ( 23:44)  Weight (kg): 117.9 ( 23:44)  BMI (kg/m2): 40.7 (11-01 @ 23:44)  BSA (m2): 2.26 ( @ 23:44)  CAPILLARY BLOOD GLUCOSE    POCT Blood Glucose.: 237 mg/dL (2020 11:26)  POCT Blood Glucose.: 356 mg/dL (2020 08:38)  POCT Blood Glucose.: 412 mg/dL (2020 06:53)  POCT Blood Glucose.: 159 mg/dL (2020 16:46)    Review of Systems:  CONSTITUTIONAL: No fever or chills  RESPIRATORY: +shortness of breath  CARDIOVASCULAR: +chest pain  GASTROINTESTINAL: No abdominal pain, nausea, vomiting, diarrhea  GENITOURINARY: No urinary frequency, gross hematuria, dysuria  SKIN: No rash or skin lesion  MUSCULOSKELETAL: No swelling    PHYSICAL EXAM:  GENERAL: Alert, awake, oriented x3, no acute distress  CHEST/LUNG: Bilateral clear breath sounds  HEART: Regular rate and rhythm  ABDOMEN: Soft, nontender, non distended  EXTREMITIES: no pedal edema  Neurology: no gross focal neurological deficit    LABS:      136  |  101  |  36<H>  ----------------------------<  467<HH>  4.8   |  21<L>  |  1.65<H>    Ca    9.3      2020 06:55  Mg     2.3         TPro  7.0  /  Alb  3.8  /  TBili  0.2  /  DBili      /  AST  13  /  ALT  17  /  AlkPhos  118  11    Creatinine Trend: 1.65 <--, 1.60 <--                        13.5   6.37  )-----------( 221      ( 2020 06:55 )             40.8     Urine Studies:  Urinalysis Basic - ( 2020 19:06 )    Color: Yellow / Appearance: SL Cloudy / S.010 / pH:   Gluc:  / Ketone: NEGATIVE  / Bili: Negative / Urobili: 0.2 E.U./dL   Blood:  / Protein: Trace mg/dL / Nitrite: NEGATIVE   Leuk Esterase: NEGATIVE / RBC: < 5 /HPF / WBC < 5 /HPF   Sq Epi:  / Non Sq Epi: Moderate /HPF / Bacteria: Present /HPF                Assessment/Plan:   65F with CKD baseline Cr 1.2, DM, CAD, and GERD admitted for R/O ACS and syncope work-up, found to have KIKA (Cr 1.65). Nephrology consulted for KIKA pending contrast studies.      #KIKA on CKD   -baseline creatinine 1.2   -No clear etiology based on history   -Potential etiologies include pre-renal vs. ATN (supported by presence of granular casts on UA) vs post-renal   -Recommend NS @ 75cc/hr for 6hr pre- and post-contrast studies to reduce risk for RENATE  -Trend BID BMP + urine electrolytes  -Renal sono     Thank you for the opportunity to participate in the care of your patient. The nephrology service remains available to assist with any questions or concerns. Please feel free to reach us by paging the on-call nephrology fellow for urgent issues or as below.     Chinedu Katz M.D.   PGY-4, Nephrology Fellow   C: [phone]   P: [phone] HPI:  Patient is a 65 year old woman with CKD baseline Cr 1.2, DM, CAD (NSTEMI s/p PCI 2018), stable angina, GERD, and nephrolithiasis present with SOB x 2 days associated w/ chest tightness.  Patient states that she used her prescribed sublingual nitroglycerin x 2 did not temporarily improved her symptoms. ED provider stated that patient had a syncopal episode while getting settled in the ED w/ LOC but no fall or head trauma, and patient recovered approximately one minute later and was alert and oriented x 3. Patient reports she does not recall the incident. Patient was not on monitor in ED when event occurred, and no telemetry events noted since being placed on the monitor. Patient denies prior syncopal episodes, abdominal pain, nausea, vomiting, melena, LE edema, fever, chills, cough, and known sick contacts/recent travel.    In ED, VS initially showed temp 98.7 F, HR 83 bpm, /75 mmHg, 36 RR, and SPO2 99% on room air. EKG in ED showed NSR at 67 bpm w/o acute ischemic changes. Labs upon arrival significant for BUN/Cr 28/1.60, Glucose 165, INR 1.25, troponin negative x 1, BNP within normal limits, Mag 1.8, and Lactate 2.2. COVID PCR negative. UA showed cloudy appearance, present bacteria, and negative nitrites/leukocytes. CTA PE showed main pulmonary artery dilation (at 3.4 cm) consistent w/ some pulmonary arterial hypertension and negative for PE. In ED, patient was given 1L NS, Duoneb x 1, Mag 2 g IV x 1, and Solumedrol 80 mg IV x 1. Patient is now admitted to cardiology/telemetry for further management of R/O ACS and syncopal workup.     Nephrology consulted for KIKA.    PAST MEDICAL & SURGICAL HISTORY:  As per HPI.    Allergies:  Celebrex (Short breath)    Home Medications:   aspirin enteric coated 81 milliGRAM(s) Oral daily  atorvastatin 80 milliGRAM(s) Oral at bedtime  clopidogrel Tablet 75 milliGRAM(s) Oral daily  dextrose 40% Gel 15 Gram(s) Oral once PRN  dextrose 5%. 1000 milliLiter(s) IV Continuous <Continuous>  dextrose 50% Injectable 12.5 Gram(s) IV Push once  dextrose 50% Injectable 25 Gram(s) IV Push once  dextrose 50% Injectable 25 Gram(s) IV Push once  dicyclomine 10 milliGRAM(s) Oral two times a day before meals  glucagon  Injectable 1 milliGRAM(s) IntraMuscular once PRN  heparin   Injectable 7500 Unit(s) SubCutaneous every 8 hours  insulin lispro (ADMELOG) corrective regimen sliding scale   SubCutaneous Before meals and at bedtime  lactated ringers. 500 milliLiter(s) IV Continuous <Continuous>  metoprolol succinate ER 25 milliGRAM(s) Oral daily  nitroglycerin     SubLingual 0.4 milliGRAM(s) SubLingual every 5 minutes PRN  pantoprazole    Tablet 40 milliGRAM(s) Oral before breakfast    Hospital Medications:   MEDICATIONS  (STANDING):  aspirin enteric coated 81 milliGRAM(s) Oral daily  atorvastatin 80 milliGRAM(s) Oral at bedtime  clopidogrel Tablet 75 milliGRAM(s) Oral daily  dextrose 5%. 1000 milliLiter(s) (50 mL/Hr) IV Continuous <Continuous>  dextrose 50% Injectable 12.5 Gram(s) IV Push once  dextrose 50% Injectable 25 Gram(s) IV Push once  dextrose 50% Injectable 25 Gram(s) IV Push once  dicyclomine 10 milliGRAM(s) Oral two times a day before meals  heparin   Injectable 7500 Unit(s) SubCutaneous every 8 hours  insulin lispro (ADMELOG) corrective regimen sliding scale   SubCutaneous Before meals and at bedtime  lactated ringers. 500 milliLiter(s) (75 mL/Hr) IV Continuous <Continuous>  metoprolol succinate ER 25 milliGRAM(s) Oral daily  pantoprazole    Tablet 40 milliGRAM(s) Oral before breakfast    FAMILY HISTORY:  No pertinent family history in first degree relatives    VITALS:  T(F): 97 (20 @ 09:48), Max: 98.8 (20 @ 04:48)  HR: 60 (20 @ 11:51)  BP: 152/68 (20 @ 11:51)  RR: 18 (20 @ 11:51)  SpO2: 95% (20 @ 11:51)  Wt(kg): --    Height (cm): 170.2 ( 23:44)  Weight (kg): 117.9 ( 23:44)  BMI (kg/m2): 40.7 (11-01 @ 23:44)  BSA (m2): 2.26 ( @ 23:44)  CAPILLARY BLOOD GLUCOSE    POCT Blood Glucose.: 237 mg/dL (2020 11:26)  POCT Blood Glucose.: 356 mg/dL (2020 08:38)  POCT Blood Glucose.: 412 mg/dL (2020 06:53)  POCT Blood Glucose.: 159 mg/dL (2020 16:46)    Review of Systems:  CONSTITUTIONAL: No fever or chills  RESPIRATORY: no shortness of breath  CARDIOVASCULAR: +chest pain  GASTROINTESTINAL: No abdominal pain, nausea, vomiting, diarrhea  GENITOURINARY: No urinary frequency, gross hematuria, dysuria  SKIN: No rash or skin lesion  MUSCULOSKELETAL: No swelling  psych - anxiety     PHYSICAL EXAM:  GENERAL: Alert, awake, oriented x3, no acute distress  CHEST/LUNG: Bilateral clear breath sounds  HEART: Regular rate and rhythm  ABDOMEN: Soft, nontender, non distended  EXTREMITIES: no pedal edema  Neurology: no gross focal neurological deficit    LABS:      136  |  101  |  36<H>  ----------------------------<  467<HH>  4.8   |  21<L>  |  1.65<H>    Ca    9.3      2020 06:55  Mg     2.3         TPro  7.0  /  Alb  3.8  /  TBili  0.2  /  DBili      /  AST  13  /  ALT  17  /  AlkPhos  118  11    Creatinine Trend: 1.65 <--, 1.60 <--                        13.5   6.37  )-----------( 221      ( 2020 06:55 )             40.8     Urine Studies:  Urinalysis Basic - ( 2020 19:06 )    Color: Yellow / Appearance: SL Cloudy / S.010 / pH:   Gluc:  / Ketone: NEGATIVE  / Bili: Negative / Urobili: 0.2 E.U./dL   Blood:  / Protein: Trace mg/dL / Nitrite: NEGATIVE   Leuk Esterase: NEGATIVE / RBC: < 5 /HPF / WBC < 5 /HPF   Sq Epi:  / Non Sq Epi: Moderate /HPF / Bacteria: Present /HPF                Assessment/Plan:   65F with CKD baseline Cr 1.2, DM, CAD, and GERD admitted for R/O ACS and syncope work-up, found to have KIKA (Cr 1.65). Nephrology consulted for KIKA pending contrast studies.      #KIKA on CKD   -baseline creatinine 1.2   -No clear etiology based on history   -Potential etiologies include pre-renal vs. ATN (supported by presence of granular casts on UA) vs post-renal   -Recommend NS @ 75cc/hr for 6hr pre- and post-contrast studies to reduce risk for RENATE  -Trend BID BMP + urine electrolytes  -Renal sono     Thank you for the opportunity to participate in the care of your patient. The nephrology service remains available to assist with any questions or concerns. Please feel free to reach us by paging the on-call nephrology fellow for urgent issues or as below.     Chinedu Katz M.D.   PGY-4, Nephrology Fellow   C: [phone]   P: [phone]

## 2020-11-02 NOTE — PROGRESS NOTE ADULT - PROBLEM SELECTOR PLAN 7
-LDL 77  -continue atorvastatin 80mg daily (on Rosuvastatin 20 mg daily at home)    Dispo- pending cardiac cath, anticipate d/c 11/3  PT eval ordered    Case d/w Dr. Patel -SBP as high as 180s  -home regimen: losartan/HCTZ 100/12.5mg daily (holding due to KIKA)  -added coreg 6.25mg BID and amlodipine 5mg daily

## 2020-11-02 NOTE — PROVIDER CONTACT NOTE (CRITICAL VALUE NOTIFICATION) - SITUATION
Chemistry tech informed of blood glucose level of 467
Chemistry tech informed of critical value Lactic Acid of 4

## 2020-11-03 ENCOUNTER — TRANSCRIPTION ENCOUNTER (OUTPATIENT)
Age: 65
End: 2020-11-03

## 2020-11-03 VITALS
SYSTOLIC BLOOD PRESSURE: 126 MMHG | HEART RATE: 59 BPM | RESPIRATION RATE: 18 BRPM | DIASTOLIC BLOOD PRESSURE: 69 MMHG | OXYGEN SATURATION: 95 %

## 2020-11-03 LAB
ANION GAP SERPL CALC-SCNC: 12 MMOL/L — SIGNIFICANT CHANGE UP (ref 5–17)
BUN SERPL-MCNC: 31 MG/DL — HIGH (ref 7–23)
C PEPTIDE SERPL-MCNC: 7.2 NG/ML — HIGH (ref 1.1–4.4)
CALCIUM SERPL-MCNC: 9 MG/DL — SIGNIFICANT CHANGE UP (ref 8.4–10.5)
CHLORIDE SERPL-SCNC: 101 MMOL/L — SIGNIFICANT CHANGE UP (ref 96–108)
CO2 SERPL-SCNC: 25 MMOL/L — SIGNIFICANT CHANGE UP (ref 22–31)
CREAT SERPL-MCNC: 1.44 MG/DL — HIGH (ref 0.5–1.3)
GLUCOSE BLDC GLUCOMTR-MCNC: 129 MG/DL — HIGH (ref 70–99)
GLUCOSE BLDC GLUCOMTR-MCNC: 187 MG/DL — HIGH (ref 70–99)
GLUCOSE SERPL-MCNC: 215 MG/DL — HIGH (ref 70–99)
HCT VFR BLD CALC: 38.3 % — SIGNIFICANT CHANGE UP (ref 34.5–45)
HGB BLD-MCNC: 12.7 G/DL — SIGNIFICANT CHANGE UP (ref 11.5–15.5)
MAGNESIUM SERPL-MCNC: 1.9 MG/DL — SIGNIFICANT CHANGE UP (ref 1.6–2.6)
MCHC RBC-ENTMCNC: 29.6 PG — SIGNIFICANT CHANGE UP (ref 27–34)
MCHC RBC-ENTMCNC: 33.2 GM/DL — SIGNIFICANT CHANGE UP (ref 32–36)
MCV RBC AUTO: 89.3 FL — SIGNIFICANT CHANGE UP (ref 80–100)
NRBC # BLD: 0 /100 WBCS — SIGNIFICANT CHANGE UP (ref 0–0)
PLATELET # BLD AUTO: 211 K/UL — SIGNIFICANT CHANGE UP (ref 150–400)
POTASSIUM SERPL-MCNC: 3.7 MMOL/L — SIGNIFICANT CHANGE UP (ref 3.5–5.3)
POTASSIUM SERPL-SCNC: 3.7 MMOL/L — SIGNIFICANT CHANGE UP (ref 3.5–5.3)
RBC # BLD: 4.29 M/UL — SIGNIFICANT CHANGE UP (ref 3.8–5.2)
RBC # FLD: 13.2 % — SIGNIFICANT CHANGE UP (ref 10.3–14.5)
SARS-COV-2 IGG SERPL QL IA: NEGATIVE — SIGNIFICANT CHANGE UP
SARS-COV-2 IGM SERPL IA-ACNC: <0.1 INDEX — SIGNIFICANT CHANGE UP
SODIUM SERPL-SCNC: 138 MMOL/L — SIGNIFICANT CHANGE UP (ref 135–145)
WBC # BLD: 9.24 K/UL — SIGNIFICANT CHANGE UP (ref 3.8–10.5)
WBC # FLD AUTO: 9.24 K/UL — SIGNIFICANT CHANGE UP (ref 3.8–10.5)

## 2020-11-03 PROCEDURE — 99232 SBSQ HOSP IP/OBS MODERATE 35: CPT

## 2020-11-03 PROCEDURE — 99233 SBSQ HOSP IP/OBS HIGH 50: CPT

## 2020-11-03 RX ORDER — AMLODIPINE BESYLATE 2.5 MG/1
1 TABLET ORAL
Qty: 0 | Refills: 0 | DISCHARGE
Start: 2020-11-03

## 2020-11-03 RX ORDER — AMLODIPINE BESYLATE 2.5 MG/1
1 TABLET ORAL
Qty: 30 | Refills: 0
Start: 2020-11-03 | End: 2020-12-02

## 2020-11-03 RX ORDER — CARVEDILOL PHOSPHATE 80 MG/1
1 CAPSULE, EXTENDED RELEASE ORAL
Qty: 60 | Refills: 0
Start: 2020-11-03 | End: 2020-12-02

## 2020-11-03 RX ORDER — ASPIRIN/CALCIUM CARB/MAGNESIUM 324 MG
1 TABLET ORAL
Qty: 30 | Refills: 11
Start: 2020-11-03 | End: 2021-10-28

## 2020-11-03 RX ORDER — ENOXAPARIN SODIUM 100 MG/ML
40 INJECTION SUBCUTANEOUS
Qty: 1 | Refills: 0
Start: 2020-11-03 | End: 2020-12-02

## 2020-11-03 RX ORDER — POTASSIUM CHLORIDE 20 MEQ
40 PACKET (EA) ORAL ONCE
Refills: 0 | Status: COMPLETED | OUTPATIENT
Start: 2020-11-03 | End: 2020-11-03

## 2020-11-03 RX ORDER — LOSARTAN/HYDROCHLOROTHIAZIDE 100MG-25MG
1 TABLET ORAL
Qty: 0 | Refills: 0 | DISCHARGE

## 2020-11-03 RX ORDER — INSULIN LISPRO 100/ML
14 VIAL (ML) SUBCUTANEOUS
Qty: 1 | Refills: 0
Start: 2020-11-03 | End: 2020-12-02

## 2020-11-03 RX ORDER — METFORMIN HYDROCHLORIDE 850 MG/1
1 TABLET ORAL
Qty: 0 | Refills: 0 | DISCHARGE

## 2020-11-03 RX ORDER — INSULIN ASPART 100 [IU]/ML
14 INJECTION, SOLUTION SUBCUTANEOUS
Qty: 1 | Refills: 0
Start: 2020-11-03 | End: 2020-12-02

## 2020-11-03 RX ORDER — CARVEDILOL PHOSPHATE 80 MG/1
1 CAPSULE, EXTENDED RELEASE ORAL
Qty: 0 | Refills: 0 | DISCHARGE
Start: 2020-11-03

## 2020-11-03 RX ORDER — INSULIN DETEMIR 100/ML (3)
40 INSULIN PEN (ML) SUBCUTANEOUS
Qty: 1 | Refills: 0
Start: 2020-11-03 | End: 2020-12-02

## 2020-11-03 RX ORDER — ONDANSETRON 8 MG/1
1 TABLET, FILM COATED ORAL
Qty: 0 | Refills: 0 | DISCHARGE

## 2020-11-03 RX ORDER — CLOPIDOGREL BISULFATE 75 MG/1
1 TABLET, FILM COATED ORAL
Qty: 0 | Refills: 0 | DISCHARGE

## 2020-11-03 RX ORDER — ASPIRIN/CALCIUM CARB/MAGNESIUM 324 MG
1 TABLET ORAL
Qty: 0 | Refills: 0 | DISCHARGE

## 2020-11-03 RX ORDER — CLOPIDOGREL BISULFATE 75 MG/1
1 TABLET, FILM COATED ORAL
Qty: 30 | Refills: 11
Start: 2020-11-03 | End: 2021-10-28

## 2020-11-03 RX ORDER — INSULIN GLARGINE 100 [IU]/ML
8 INJECTION, SOLUTION SUBCUTANEOUS
Qty: 0 | Refills: 0 | DISCHARGE

## 2020-11-03 RX ADMIN — HEPARIN SODIUM 7500 UNIT(S): 5000 INJECTION INTRAVENOUS; SUBCUTANEOUS at 06:18

## 2020-11-03 RX ADMIN — Medication 2: at 07:05

## 2020-11-03 RX ADMIN — Medication 81 MILLIGRAM(S): at 10:43

## 2020-11-03 RX ADMIN — Medication 40 MILLIEQUIVALENT(S): at 10:42

## 2020-11-03 RX ADMIN — Medication 14 UNIT(S): at 07:06

## 2020-11-03 RX ADMIN — CLOPIDOGREL BISULFATE 75 MILLIGRAM(S): 75 TABLET, FILM COATED ORAL at 10:43

## 2020-11-03 RX ADMIN — PANTOPRAZOLE SODIUM 40 MILLIGRAM(S): 20 TABLET, DELAYED RELEASE ORAL at 06:18

## 2020-11-03 NOTE — DISCHARGE NOTE PROVIDER - PROVIDER TOKENS
PROVIDER:[TOKEN:[[id number]],FOLLOWUP:[1 week]],PROVIDER:[TOKEN:[[id number]],FOLLOWUP:[2 weeks]],PROVIDER:[TOKEN:[[id number]],FOLLOWUP:[1 week]]

## 2020-11-03 NOTE — PROGRESS NOTE ADULT - SUBJECTIVE AND OBJECTIVE BOX
Patient is a 65y Female seen and evaluated at bedside  S/p cardiac cath on 20 with DERIK pRCA and DERIK Rancho  Acceptable BP and volume status  No complaints    Meds:  acetaminophen   Tablet .. 650 every 6 hours PRN  amLODIPine   Tablet 5 every 24 hours  aspirin enteric coated 81 daily  atorvastatin 80 at bedtime  carvedilol 6.25 every 12 hours  clopidogrel Tablet 75 daily  dextrose 40% Gel 15 once PRN  dextrose 5%. 1000 <Continuous>  dextrose 50% Injectable 12.5 once  dextrose 50% Injectable 25 once  dextrose 50% Injectable 25 once  dicyclomine 10 two times a day before meals  glucagon  Injectable 1 once PRN  heparin   Injectable 7500 every 8 hours  insulin glargine Injectable (LANTUS) 36 at bedtime  insulin lispro (ADMELOG) corrective regimen sliding scale  Before meals and at bedtime  insulin lispro Injectable (ADMELOG) 14 three times a day before meals  nitroglycerin     SubLingual 0.4 every 5 minutes PRN  pantoprazole    Tablet 40 before breakfast  sodium chloride 0.9%. 500 <Continuous>      T(C): , Max: 37 (20 @ 14:28)  T(F): , Max: 98.6 (20 @ 14:28)  HR: 59 (20 @ 10:43)  BP: 126/69 (20 @ 10:43)  BP(mean): --  RR: 18 (20 @ 10:43)  SpO2: 95% (20 @ 10:43)  Wt(kg): --     @ 07:01  -   @ 13:08  --------------------------------------------------------  IN: 355 mL / OUT: 0 mL / NET: 355 mL      Review of Systems:  CONSTITUTIONAL: No fever or chills, No fatigue or tiredness  RESPIRATORY: No shortness of breath, cough, hemoptysis  CARDIOVASCULAR: No chest pain or shortness of breath  GENITOURINARY: No dysuria or urinary burning, No difficulty passing urine, No hematuria      PHYSICAL EXAM:  GENERAL: well-developed, well nourished, alert, no acute distress at present  NECK: supple, No JVD  CHEST/LUNG: Clear to auscultation bilaterally  HEART: normal S1S2, RRR  ABDOMEN: Soft, Nontender, +BS, No flank tenderness bilateral  EXTREMITIES: No clubbing, cyanosis, or edema   NEUROLOGY: AAO x3, no focal neurological deficit        LABS:                        12.7   9.24  )-----------( 211      ( 2020 05:49 )             38.3     11-03    138  |  101  |  31<H>  ----------------------------<  215<H>  3.7   |  25  |  1.44<H>    Ca    9.0      2020 05:49  Mg     1.9     11-03    TPro  7.0  /  Alb  3.8  /  TBili  0.2  /  DBili  x   /  AST  13  /  ALT  17  /  AlkPhos  118  11-02      PT/INR - ( 2020 15:36 )   PT: 14.2 sec;   INR: 1.19          PTT - ( 2020 15:36 )  PTT:32.1 sec  Urinalysis Basic - ( 2020 19:06 )    Color: Yellow / Appearance: SL Cloudy / S.010 / pH: x  Gluc: x / Ketone: NEGATIVE  / Bili: Negative / Urobili: 0.2 E.U./dL   Blood: x / Protein: Trace mg/dL / Nitrite: NEGATIVE   Leuk Esterase: NEGATIVE / RBC: < 5 /HPF / WBC < 5 /HPF   Sq Epi: x / Non Sq Epi: Moderate /HPF / Bacteria: Present /HPF            RADIOLOGY & ADDITIONAL STUDIES:           Patient is a 65y Female seen and evaluated at bedside  S/p cardiac cath on 20 with DERIK pRCA and DERIK [place]  Acceptable BP and volume status  No complaints    Meds:  acetaminophen   Tablet .. 650 every 6 hours PRN  amLODIPine   Tablet 5 every 24 hours  aspirin enteric coated 81 daily  atorvastatin 80 at bedtime  carvedilol 6.25 every 12 hours  clopidogrel Tablet 75 daily  dextrose 40% Gel 15 once PRN  dextrose 5%. 1000 <Continuous>  dextrose 50% Injectable 12.5 once  dextrose 50% Injectable 25 once  dextrose 50% Injectable 25 once  dicyclomine 10 two times a day before meals  glucagon  Injectable 1 once PRN  heparin   Injectable 7500 every 8 hours  insulin glargine Injectable (LANTUS) 36 at bedtime  insulin lispro (ADMELOG) corrective regimen sliding scale  Before meals and at bedtime  insulin lispro Injectable (ADMELOG) 14 three times a day before meals  nitroglycerin     SubLingual 0.4 every 5 minutes PRN  pantoprazole    Tablet 40 before breakfast  sodium chloride 0.9%. 500 <Continuous>      T(C): , Max: 37 (20 @ 14:28)  T(F): , Max: 98.6 (20 @ 14:28)  HR: 59 (20 @ 10:43)  BP: 126/69 (20 @ 10:43)  BP(mean): --  RR: 18 (20 @ 10:43)  SpO2: 95% (20 @ 10:43)  Wt(kg): --     @ 07:01  -   @ 13:08  --------------------------------------------------------  IN: 355 mL / OUT: 0 mL / NET: 355 mL      Review of Systems:  CONSTITUTIONAL: No fever or chills, No fatigue or tiredness  RESPIRATORY: No shortness of breath, cough, hemoptysis  CARDIOVASCULAR: No chest pain or shortness of breath  GENITOURINARY: No dysuria       PHYSICAL EXAM:  GENERAL: well-developed, well nourished, alert, no acute distress at present  CHEST/LUNG: Clear to auscultation bilaterally  HEART: normal S1S2, RRR  ABDOMEN: Soft, Nontender  EXTREMITIES: No edema   NEUROLOGY: AAO x3, no focal neurological deficit        LABS:                        12.7   9.24  )-----------( 211      ( 2020 05:49 )             38.3     11-03    138  |  101  |  31<H>  ----------------------------<  215<H>  3.7   |  25  |  1.44<H>    Ca    9.0      2020 05:49  Mg     1.9     11-    TPro  7.0  /  Alb  3.8  /  TBili  0.2  /  DBili  x   /  AST  13  /  ALT  17  /  AlkPhos  118  11-02      PT/INR - ( 2020 15:36 )   PT: 14.2 sec;   INR: 1.19          PTT - ( 2020 15:36 )  PTT:32.1 sec  Urinalysis Basic - ( 2020 19:06 )    Color: Yellow / Appearance: SL Cloudy / S.010 / pH: x  Gluc: x / Ketone: NEGATIVE  / Bili: Negative / Urobili: 0.2 E.U./dL   Blood: x / Protein: Trace mg/dL / Nitrite: NEGATIVE   Leuk Esterase: NEGATIVE / RBC: < 5 /HPF / WBC < 5 /HPF   Sq Epi: x / Non Sq Epi: Moderate /HPF / Bacteria: Present /HPF            RADIOLOGY & ADDITIONAL STUDIES:

## 2020-11-03 NOTE — DISCHARGE NOTE PROVIDER - NSDCFUADDINST_GEN_ALL_CORE_FT
-Your procedure was done through your wrist -You do not need to keep this area covered and you may shower -Please avoid any heavy lifting  (no more than 3 to 5 lbs) or strenuous activity for five days -If you develop any swelling, bleeding, hardening of the skin (hematoma formation), acute pain, numbness/tingling  in your arm please contact your doctor immediately or call our 24/7 line: [phone]

## 2020-11-03 NOTE — CONSULT NOTE ADULT - SUBJECTIVE AND OBJECTIVE BOX
Preventive Cardiology Consultation Note    CHIEF COMPLAINT: s/p cardiac catheterization requiring cardiovascular prevention optimization and education    HISTORY OF PRESENT ILLNESS: 64 y/o obese female, current smoker, w/ PMHx HLD, type 2 DM, NSTEMI/CAD (prior PCI 2018 at [facility], DERIK mid RCA), IBS, GERD, and history of kidney stones who presented to [facility] ED c/o dizziness and SOB x 2 days associated w/ chest tightness. Patient is now s/p cardiac cath w/ DERIK pRCA, DERKI pLAD, and other findings of LM normal, mRCA patent stent, mLCx 30%, Ramus mild luminal irregularities, EDP 17 mmHg.     Review of systems otherwise negative.     Lifestyle History:  Mediterranean Diet Score (9 question survey) was 4.   (8-9: optimal, 6-7: near-optimal, 4-5: suboptimal, 0-3: markedly suboptimal)  Exercise: Patient reports exercising at a moderate level for <30 minutes per week.   Smoking: Current smoker (-/ PPD since she was 13 years old).  Stress: Patient reports a high amount of family stress.     PAST MEDICAL & SURGICAL HISTORY:  Kidney stones    CAD (coronary artery disease)    HLD (hyperlipidemia)    GERD (gastroesophageal reflux disease)    HTN (hypertension)    Diabetes    History of coronary artery stent placement    H/O  section      FAMILY HISTORY:   Patient denies any first degree family history of premature CAD or CVA.     Allergies:   Celebrex (Short breath)      HOME MEDICATIONS:   amLODIPine 5 mg oral tablet: 1 tab(s) orally every 24 hours (2020 12:58)  Aspirin Enteric Coated 81 mg oral delayed release tablet: 1 tab(s) orally once a day (2020 23:49)  carvedilol 6.25 mg oral tablet: 1 tab(s) orally every 12 hours (2020 12:58)  dicyclomine 10 mg oral capsule: 1 cap(s) orally 2 times a day (2020 23:49)  Lantus 100 units/mL subcutaneous solution: 8 unit(s) subcutaneous once a day (at bedtime) (2020 13:20)  metFORMIN 1000 mg oral tablet: 1 tab(s) orally 2 times a day (2020 10:50)  nitroglycerin 0.4 mg sublingual tablet: 1 tab(s) sublingual every 5 minutes, As Needed (2020 23:49)  ondansetron 4 mg oral tablet: 1 tab(s) orally every 8 hours, As Needed (2020 12:58)  Plavix 75 mg oral tablet: 1 tab(s) orally once a day (2020 23:49)  Protonix 40 mg oral delayed release tablet: 1 tab(s) orally once a day (2020 23:49)  rosuvastatin 20 mg oral tablet: 1 tab(s) orally once a day (2020 23:49)      PHYSICAL EXAM:  T(C): 36.2 (20 @ 09:45), Max: 37 (20 @ 14:28)  T(F): 97.1 (20 @ 09:45), Max: 98.6 (20 @ 14:28)  HR: 59 (20 @ 10:43) (50 - 75)  BP: 126/69 (20 @ 10:43) (125/60 - 186/86)  RR: 18 (20 @ 10:43) (14 - 25)  SpO2: 95% (20 @ 10:43) (93% - 97%)  Height (cm): 170.2 ( 23:44)  Weight (kg): 117.9 ( 23:44)  BMI (kg/m2): 40.7 ( 23:44)  	  Gen- awake, conversive  Head-NCAT; eyes: no corneal arcus noted b/l; no xanthelasmas   Neck- no thyromegaly, no cervical LAD, no JVD, no carotid bruit b/l  Respiratory- good air entry b/l, no WRR  Cardiovascular- S1S2, RRR, no MRG appr, no LE edema b/l, distal pulses 2+ b/l  Gastrointestinal- no HSM, no masses  Neurology- moves all extremities, no focal deficits  Psych- normal affect, non-depressed mood  Skin- no lesions, no rashes, no xanthomas     LABS:                        12.7   9.24  )-----------( 211      ( 2020 05:49 )             38.3         138  |  101  |  31<H>  ----------------------------<  215<H>  3.7   |  25  |  1.44<H>    Ca    9.0      2020 05:49  Mg     1.9         TPro  7.0  /  Alb  3.8  /  TBili  0.2  /  DBili  x   /  AST  13  /  ALT  17  /  AlkPhos  118        Cholesterol, Serum: 141 mg/dL ( @ 06:55)  HDL Cholesterol, Serum: 45 mg/dL ( @ 06:55)  Triglycerides, Serum: 94 mg/dL ( @ 06:55)  LDL Cholesterol, Serum: 77 mg/dL ( @ :55)    A1c 9.4%    ASSESSMENT/RECOMMENDATIONS: 	  Patient's dietary, exercise and overall lifestyle habits were reviewed. The concept of atherosclerosis and its systemic nature was discussed with a focus on the need to get all cardiovascular risk factors to goal. At this time, I would like to make the following recommendations to optimize atherosclerotic risk factors.     RECOMMENDATIONS:   Anti-platelet Therapy: DAPT per interventionalist recommendation.   Lipid Therapy: Patient is currently taking atorvastatin 80mg and is compliant and tolerating it well. Along with lifestyle modifications, we believe it would be reasonable to consider adding Zetia 10mg daily to further optimize her medication regimen and lower her LDL to below 70mg/dL.  Hypertension: Blood pressures during this stay were well-controlled.   Mediterranean Diet Score is 4. Some suggestions include continue incorporating 2 or more servings per day of vegetables, fruits, and whole grains. Increase intake of fish and legumes/beans to 2 or more servings per week. Aim to increase intake of healthy fats, such as olive oil and avocados, and have a handful of nuts/seeds most days. Reduce red/processed meat consumption to 2 or fewer times per week.   Exercise: Recommended gradually increasing activity to 30-45 minutes most days of the week once cleared by referring cardiologist. Cardiac rehab might benefit this patient and is covered by major insurance plans (other than co-pays), please refer.   Medication Adherence: Patient has no issues with adherence at this time.   Smoking: Smoking cessation was strongly encouraged and discussed with the patient. We suggested picking a day in the future that will be the planned quit date. We recommend following up with her PCP for further assistance, if needed for nicotine replacement therapy. The risks to overall health if she continues to smoke were discussed, and patient expressed understanding.    Obesity/Overweight: The patient was advised about specific mechanisms such as reduced portions and increased activity for efforts toward weight loss.   Glucometabolic State: Patient's blood sugar is not well-controlled on the current regimen at this time. HbA1c today was 9.4%. Depending on discussions with patient's PCP and/or endocrinologist, we would recommend the possibility of a GLP-1 agonist or SGLT-2 inhibitor, as these newer agents have cardiovascular benefits as well as glucose control. The goal is to transition to more guideline-based cardioprotective agents that will reduce the risk of recurrent CV events, while reducing the dependence on insulin.    Sleep Apnea: The patient is at low risk for sleep apnea.   Psychological Stress: We recommend the patient find healthy ways to cope with her stress level, such as physical activity, reducing workload if possible, meditation, spending time with friends and/or family, and any activities that bring relaxation and enjoyment. She currently sees a therapist once a week, which she feels helps her.     Thank you for the opportunity to see this patient. Please feel free to contact Prevention if there are any questions, or if you feel that your patient would benefit from continued follow-up visits with the Program.    [facility]  Cardiovascular Prevention     Clementina Shin MD  System Director, Cardiovascular Prevention

## 2020-11-03 NOTE — DISCHARGE NOTE PROVIDER - NSDCCPCAREPLAN_GEN_ALL_CORE_FT
PRINCIPAL DISCHARGE DIAGNOSIS  Diagnosis: Coronary artery disease  Assessment and Plan of Treatment: You underwent a cardiac catheterization and the blockage/narrowing due to plaque build-up in your was opened using a stent. You should continue taking aspirin 81mg daily and Plavix 75 mg daily.  NEVER MISS A DOSE OF ASPIRIN OR PLAVIX; IF YOU DO, YOU ARE AT RISK OF YOUR STENT CLOSING AND HAVING A HEART ATTACK. DO NOT STOP THESE TWO MEDICATIONS UNLESS INSTRUCTED TO DO SO BY YOUR CARDIOLOGIST.  You should also continue taking_____  Aspirin and Plavix can put you at increased risk of bleeding; please avoid NSAIDS (such as Motrin, Advil, Ibuprofen, Naproxen, or Aleve, as these medications may further your risk of bleeding.  -Your procedure was done through your groin or your wrist  -You do not need to keep this area covered and you may shower  -Please avoid any heavy lifting  (no more than 3 to 5 lbs) or strenuous activity for five days  -If you develop any swelling, bleeding, hardening of the skin (hematoma formation), acute pain, numbness/tingling  in your arm or leg please contact your doctor immediately or call our 24/7 line: [phone]  -Please follow up with  …in 1-2 weeks  Please return to the hospital or seek immediate medical attention if you have symptoms including, but not limited to, persistent chest pain or shortness of breath, especially if it’s associated with nausea, vomiting, sweating, passing out, or pain radiating to your jaw, shoulder, or arm.      SECONDARY DISCHARGE DIAGNOSES  Diagnosis: Hypertension  Assessment and Plan of Treatment: Hypertension    Diagnosis: Type 2 diabetes mellitus with hyperglycemia  Assessment and Plan of Treatment:     Diagnosis: Chronic kidney disease  Assessment and Plan of Treatment:      PRINCIPAL DISCHARGE DIAGNOSIS  Diagnosis: Coronary artery disease  Assessment and Plan of Treatment: You underwent a cardiac catheterization and the blockage/narrowing due to plaque build-up in your right coronary artery and left anterior descending artery were opened using a stent. You should continue taking aspirin 81mg daily and Plavix 75 mg daily, as well as crestor 20mg daily.   NEVER MISS A DOSE OF ASPIRIN OR PLAVIX; IF YOU DO, YOU ARE AT RISK OF YOUR STENT CLOSING AND HAVING A HEART ATTACK. DO NOT STOP THESE TWO MEDICATIONS UNLESS INSTRUCTED TO DO SO BY YOUR CARDIOLOGIST.  Aspirin and Plavix can put you at increased risk of bleeding; please avoid NSAIDS (such as Motrin, Advil, Ibuprofen, Naproxen, or Aleve, as these medications may further your risk of bleeding.  Please follow up with Dr. Patel for a post-procedure check in 1 week and Dr. Duggan within 1 week.  Please return to the hospital or seek immediate medical attention if you have symptoms including, but not limited to, persistent chest pain or shortness of breath, especially if it’s associated with nausea, vomiting, sweating, passing out, or pain radiating to your jaw, shoulder, or arm.      SECONDARY DISCHARGE DIAGNOSES  Diagnosis: Hypertension  Assessment and Plan of Treatment: Please stop taking losartan/hydrochlorothiazide, and instead continue carvedilol 6.25mg twice a day, amlodipine 5mg once a day, and ramipril 2.5mg once a day.    Diagnosis: Type 2 diabetes mellitus with hyperglycemia  Assessment and Plan of Treatment:     Diagnosis: Chronic kidney disease  Assessment and Plan of Treatment: Your kidney function was slightly decreased when you first arrived at the hospital, but improved after receiving IV fluids. Please continue to drink plenty of water and follow up with Dr. Duggan in 1 week for blood work.     PRINCIPAL DISCHARGE DIAGNOSIS  Diagnosis: Coronary artery disease  Assessment and Plan of Treatment: You underwent a cardiac catheterization and the blockage/narrowing due to plaque build-up in your right coronary artery and left anterior descending artery were opened using a stent. You should continue taking aspirin 81mg daily and Plavix 75 mg daily, as well as crestor 20mg daily.   NEVER MISS A DOSE OF ASPIRIN OR PLAVIX; IF YOU DO, YOU ARE AT RISK OF YOUR STENT CLOSING AND HAVING A HEART ATTACK. DO NOT STOP THESE TWO MEDICATIONS UNLESS INSTRUCTED TO DO SO BY YOUR CARDIOLOGIST.  Aspirin and Plavix can put you at increased risk of bleeding; please avoid NSAIDS (such as Motrin, Advil, Ibuprofen, Naproxen, or Aleve, as these medications may further your risk of bleeding.  Please follow up with Dr. Patel for a post-procedure check in 1 week and Dr. Duggan within 1 week.  Please return to the hospital or seek immediate medical attention if you have symptoms including, but not limited to, persistent chest pain or shortness of breath, especially if it’s associated with nausea, vomiting, sweating, passing out, or pain radiating to your jaw, shoulder, or arm.      SECONDARY DISCHARGE DIAGNOSES  Diagnosis: Hypertension  Assessment and Plan of Treatment: Please stop taking losartan/hydrochlorothiazide, and instead continue carvedilol 6.25mg twice a day, amlodipine 5mg once a day, and ramipril 2.5mg once a day.    Diagnosis: Type 2 diabetes mellitus with hyperglycemia  Assessment and Plan of Treatment: You hemoglobin A1c is 9.4%.  Please stop metformin and start lantus 40 units at bedtime, with admelog 14 units 3 times per day (with meals). Follow up with Dr. Bee in 1-2 weeks.    Diagnosis: Chronic kidney disease  Assessment and Plan of Treatment: Your kidney function was slightly decreased when you first arrived at the hospital, but improved after receiving IV fluids. Please continue to drink plenty of water and follow up with Dr. Duggan in 1 week for blood work.

## 2020-11-03 NOTE — DISCHARGE NOTE PROVIDER - HOSPITAL COURSE
64 y/o obese female, current smoker, w/ PMHx HTN, HLD, uncontrolled type 2 DM (A1c 9.4%), NATHAN (on CPAP), NSTEMI/CAD (prior PCI 09/2018 at [facility] with DERIK mid RCA), IBS, GERD who presented to [facility] ED c/o dizziness and SOB with exertion associated with chest pressure x2 days consistent with anginal equivalent, trop negative x3, EKG non-ischemic, admitted to [facility] for unstable angina with plan for cardiac catheterization with Dr. Patel 11/2/20 (PCI with DERIK pRCA and DERIK pLAD), with hospital course also significant for elevated lactate (4.0), hyperglycemia (FS 300s-400s), possible KIKA (Cr. 1.6), and syncopal episode in the ER.     Problem/Plan - 1:  Problem: Unstable angina. TTE 11/2: normal biventricular size/function, EF 65%, no WMA, no significant valvular disease. Cardiac catheterization with Dr. Patel 11/2/20 s/p w/ DERIK pRCA and DERIK pLAD. R radial access used s/p with no hematomas, bleeding, and pulses intact. Pt started on Amlodipine 5mg QD, Carvedilol 6.25mg BID for further cardiac protection. Pt reports daily compliance with aspirin/plavix and will c/w amlodipine 5mg QD, Carvedilol 6.25mg BID, Aspirin 81mg QD, Clopidogrel 75mg QD, Rosuvastatin 20mg QD, and nitroglycerin PRN. Pt consulted w/ preventative cardiology service during her stay and pt to f/u with outpatient cardiologist- Dr. Gisela Duggan and Dr. Sorto office.     Problem/Plan - 2:  Problem: Type 2 diabetes mellitus with hyperglycemia. Hgb A1c 9.4%, FS 300s-400s on 11/2 AM, moderate ISS. FS controlled at 186 11/3/20 AM s/p NPH 20units 11/2/20 and lantus 36 units QHS and lispro 14units TID w/ meals. Pt's home regimen was previously Metformin 1000mg BID, lantus 8 units QHS and titrated to lantus 36 units QHS and lispro 14units TID w/ meals. Endocrine (Dr. Ponce) consulted and will f/u recs on d/c.     Problem/Plan - 3:  Problem: Renal insufficiency. Cr 1.6 on admission and has improved to 1.44 11/3/20 s/p IVF.     Problem/Plan - 4:  Problem: Increased lactic acid level. Lactate 2.2 --> 4.0 --> 2.6 -->1.7 during admission; normal anion gap (r/o DKA); venous pH 7.45 in ER. There was no evidence of hypoperfusion/shock (no hypovolemia, heart failure, or sepsis); more likely type B lactic acidosis in setting of metformin use. Hoem metformin was held and Cr improved to 1.7 s/p IVF.     Problem/Plan - 5:  ·  Problem: Syncope. Witnessed syncopal event in ED as pt was getting undressed and IV placement, had LOC while speaking to ER MD, woke up approx 1 min later, no confusion, no seizure like activity and pt at baseline immediately. Not on cardiac monitor in ED during episode. Possibly vasovagal. Orthostatics negative and TTE normal as stated above. Pt had no further syncopal episodes during her stay.    Problem/Plan - 6:  Problem: Depression with anxiety. Pt reports multiple stressors at home, including caring for her unwell spouse. She speaks with a psychologist weekly. Pt was very anxious and stressed, but pt had no SI and pt did not wish to see a psychiatrist this admission. Pt was given ativan 1mg PO prior to cardiac catheterization 11/2/20 for anxiety and feels much better s/p cath.    Problem/Plan - 7:  Problem: Hypertension. SBP range from 138-186 during admission. Started pt on amlodipine 5mg QD and carvedilol 6.25mg BID for further BP control. D/c pt home losartan/HCTZ 100/12.5mg daily (held during admission due to KIKA). Will start pt on Ramipril 2.5mg QD on d/c and have pt c/w amlodipine 5mg QD, carvedilol 6.25mg BID and ramipril 2.5mg QD.    Problem/Plan - 8:  Problem: Hyperlipidemia. LDL 77 on admission. Pt on Atorvastatin 80mg QD during stay and will c/w home Rosuvastatin 20 mg QD upon d/c.       66 y/o obese female, current smoker, w/ PMHx HTN, HLD, uncontrolled type 2 DM (A1c 9.4%), NATHAN (on CPAP), NSTEMI/CAD (prior PCI 09/2018 at [facility] with DERIK mid RCA), IBS, GERD who presented to [facility] ED c/o dizziness and SOB with exertion associated with chest pressure x2 days consistent with anginal equivalent, admitted to [facility] for unstable angina with plan for cardiac catheterization with Dr. Patel 11/2/20, s/p PCI with DERIK pRCA and DERIK pLAD, with hospital course also significant for elevated lactate (4.0), hyperglycemia (FS 300s-400s), possible KIKA (Cr. 1.6), and syncopal episode in the ER.     #Unstable angina  Trop negative x3, EKG NSR, non-ischemic, TTE 11/2: normal biventricular size/function, EF 65%, no WMA, no significant valvular disease. She underwent cardiac catheterization with Dr. Patel 11/2/20 with DERIK pRCA and DERIK pLAD. R radial access (remained stable). Patient to continue aspirin 81mg daily, plavix 75mg daily, rosuvastatin 20mg QHS. Coreg 6.25mg BID added. Patient seen by preventative cardiology prior to discharge. Follow up with Dr. Patel in 1 week for post-PCI check-up and cardiologist Dr. Gisela Duggan within 1 week.    #Type 2 diabetes mellitus with hyperglycemia  Hgb A1c 9.4%, FS 300s-400s on 11/2 AM, improved s/p NPH 20 units. Endocrine (Dr. Bee) consulted and recommended lantus 36b units QHS and lispro 14 units TID with meals.  Previous home regimen included Metformin 1000mg BID and lantus 8 units QHS. Upon discharge, patient to continue ___________.     #Renal insufficiency  Cr 1.6 on admission (most recent Cr 1.2 in Jan 2020 as outpatient), improved to 1.3 s/p IVF. On day of discharge, Cr remained stable (1.4). Patient to stop losartan/HCTZ and instead start ramipril 2.5mg daily on 11/6. Pt encouraged to follow up with her PMD/cardiologist Dr. Duggan for repeat labs.     #Elevated lactate  Lactate 2.2 on admission, uptrended to 4.0 then down to 1.7 s/p IVF. Normal anion gap, bicarb 21, venous pH 7.45 in ER. There was no evidence of hypoperfusion/shock (no hypovolemia, heart failure, or sepsis); more likely type B lactic acidosis, possibly in setting of metformin use. Home metformin was held.     #Hypertension  BP elevated to 180s/80s on 11/3, likely in setting of anxiety and holding home losartan/HCTZ due to KIKA. She was started on amlodipine 5mg daily and coreg 6.25mg BID with improvement in BPs. Home losartan/HCTZ 100/12.5mg daily stopped, and pt was started on Ramipril 2.5mg daily, to be started on 11/56.    #Syncope  Witnessed syncopal event in ED as pt was getting undressed and IV placement, had LOC while speaking to ER MD, woke up approx 1 min later, no confusion, no seizure like activity and pt at baseline immediately. Not on cardiac monitor in ED during episode. Possibly vasovagal. Orthostatics negative and TTE normal as stated above. Pt had no further syncopal episodes during her stay.    #Depression with anxiety. Pt reports multiple stressors at home, including caring for her unwell spouse. She speaks with a psychologist weekly. Pt was very anxious and stressed, but pt had no SI and pt did not wish to see a psychiatrist this admission. Pt was given ativan 1mg PO prior to cardiac catheterization 11/2/20 for anxiety and feels much better s/p cath.    #Hyperlipidemia  LDL 77 on admission. Pt on Atorvastatin 80mg QD during stay and will c/w home Rosuvastatin 20 mg QD upon d/c.       64 y/o obese female, current smoker, w/ PMHx HTN, HLD, uncontrolled type 2 DM (A1c 9.4%), NATHAN (on CPAP), NSTEMI/CAD (prior PCI 09/2018 at [facility] with DERIK mid RCA), IBS, GERD who presented to [facility] ED c/o dizziness and SOB with exertion associated with chest pressure x2 days consistent with anginal equivalent, admitted to [facility] for unstable angina with plan for cardiac catheterization with Dr. Patel 11/2/20, s/p PCI with DERIK pRCA and DERIK pLAD, with hospital course also significant for elevated lactate (4.0), hyperglycemia (FS 300s-400s), possible KIKA (Cr. 1.6), and syncopal episode in the ER.     #Unstable angina  Trop negative x3, EKG NSR, non-ischemic, TTE 11/2: normal biventricular size/function, EF 65%, no WMA, no significant valvular disease. She underwent cardiac catheterization with Dr. Patel 11/2/20 with DERIK pRCA and DERIK pLAD. R radial access (remained stable). Patient to continue aspirin 81mg daily, plavix 75mg daily, rosuvastatin 20mg QHS. Coreg 6.25mg BID added. Patient seen by preventative cardiology prior to discharge. Follow up with Dr. Patel in 1 week for post-PCI check-up and cardiologist Dr. Gisela Duggan within 1 week.    #Type 2 diabetes mellitus with hyperglycemia  Hgb A1c 9.4%, FS 300s-400s on 11/2 AM, improved s/p NPH 20 units. Endocrine (Dr. Bee) consulted and recommended lantus 36 units QHS and lispro 14 units TID with meals.  Previous home regimen included Metformin 1000mg BID and lantus 8 units QHS. Upon discharge, patient to continue ___________.     #Renal insufficiency  Cr 1.6 on admission (most recent Cr 1.2 in Jan 2020 as outpatient), improved to 1.3 s/p IVF. On day of discharge, Cr remained stable (1.4). Patient to stop losartan/HCTZ and instead start ramipril 2.5mg daily on 11/6. Pt encouraged to follow up with her PMD/cardiologist Dr. Duggan for repeat labs.     #Elevated lactate  Lactate 2.2 on admission, uptrended to 4.0 then down to 1.7 s/p IVF. Normal anion gap, bicarb 21, venous pH 7.45 in ER. There was no evidence of hypoperfusion/shock (no hypovolemia, heart failure, or sepsis); more likely type B lactic acidosis, possibly in setting of metformin use. Home metformin was held.     #Hypertension  BP elevated to 180s/80s on 11/3, likely in setting of anxiety and holding home losartan/HCTZ due to KIKA. She was started on amlodipine 5mg daily and coreg 6.25mg BID with improvement in BPs. Home losartan/HCTZ 100/12.5mg daily stopped, and pt was started on Ramipril 2.5mg daily, to be started on 11/6.    #Syncope  Witnessed syncopal event in ED as pt was getting undressed and IV placement, had LOC while speaking to ER MD, woke up approx 1 min later, no confusion, no seizure like activity and pt at baseline immediately. Not on cardiac monitor in ED during episode. Possibly vasovagal. Orthostatics negative and TTE normal as stated above. Pt had no further syncopal episodes during her stay.    #Depression with anxiety. Pt reports multiple stressors at home, including caring for her unwell spouse. She speaks with a psychologist weekly. Pt was very anxious and stressed, but pt had no SI and pt did not wish to see a psychiatrist this admission. Pt was given ativan 1mg PO prior to cardiac catheterization 11/2/20 for anxiety and feels much better s/p cath.    #Hyperlipidemia  LDL 77 on admission. Pt on Atorvastatin 80mg QD during stay and will c/w home Rosuvastatin 20 mg QD upon d/c           66 y/o obese female, current smoker, w/ PMHx HTN, HLD, uncontrolled type 2 DM (A1c 9.4%), NATHAN (on CPAP), NSTEMI/CAD (prior PCI 09/2018 at [facility] with DERIK mid RCA), IBS, GERD who presented to [facility] ED c/o dizziness and SOB with exertion associated with chest pressure x2 days consistent with anginal equivalent, admitted to [facility] for unstable angina with plan for cardiac catheterization with Dr. Patel 11/2/20, s/p PCI with DERIK pRCA and DERIK pLAD, with hospital course also significant for elevated lactate (4.0), hyperglycemia (FS 300s-400s), possible KIKA (Cr. 1.6), and syncopal episode in the ER.     #Unstable angina  Trop negative x3, EKG NSR, non-ischemic, TTE 11/2: normal biventricular size/function, EF 65%, no WMA, no significant valvular disease. She underwent cardiac catheterization with Dr. Patel 11/2/20 with DERIK pRCA and DERIK pLAD. R radial access (remained stable). Patient to continue aspirin 81mg daily, plavix 75mg daily, rosuvastatin 20mg QHS. Coreg 6.25mg BID added. Patient seen by preventative cardiology prior to discharge. Follow up with Dr. Patel in 1 week for post-PCI check-up and cardiologist Dr. Gisela Duggan within 1 week.    #Type 2 diabetes mellitus with hyperglycemia  Hgb A1c 9.4%, FS 300s-400s on 11/2 AM, improved s/p NPH 20 units. Endocrine (Dr. Bee) consulted and recommended lantus 36 units QHS and lispro 14 units TID with meals.  Previous home regimen included Metformin 1000mg BID and lantus 8 units QHS. Upon discharge, patient to take lantus 40 units QHS, admelog 14 units TID with meals. Stop metformin. Follow up with Dr. Bee.     #Renal insufficiency  Cr 1.6 on admission (most recent Cr 1.2 in Jan 2020 as outpatient), improved to 1.3 s/p IVF. On day of discharge, Cr remained stable (1.4). Patient to stop losartan/HCTZ and instead start ramipril 2.5mg daily on 11/6. Pt encouraged to follow up with her PMD/cardiologist Dr. Duggan for repeat labs.     #Elevated lactate  Lactate 2.2 on admission, uptrended to 4.0 then down to 1.7 s/p IVF. Normal anion gap, bicarb 21, venous pH 7.45 in ER. There was no evidence of hypoperfusion/shock (no hypovolemia, heart failure, or sepsis); more likely type B lactic acidosis, possibly in setting of metformin use. Home metformin was held.     #Hypertension  BP elevated to 180s/80s on 11/3, likely in setting of anxiety and holding home losartan/HCTZ due to KIKA. She was started on amlodipine 5mg daily and coreg 6.25mg BID with improvement in BPs. Home losartan/HCTZ 100/12.5mg daily stopped, and pt was started on Ramipril 2.5mg daily, to be started on 11/6.    #Syncope  Witnessed syncopal event in ED as pt was getting undressed and IV placement, had LOC while speaking to ER MD, woke up approx 1 min later, no confusion, no seizure like activity and pt at baseline immediately. Not on cardiac monitor in ED during episode. Possibly vasovagal. Orthostatics negative and TTE normal as stated above. Pt had no further syncopal episodes during her stay.    #Depression with anxiety. Pt reports multiple stressors at home, including caring for her unwell spouse. She speaks with a psychologist weekly. Pt was very anxious and stressed, but pt had no SI and pt did not wish to see a psychiatrist this admission. Pt was given ativan 1mg PO prior to cardiac catheterization 11/2/20 for anxiety and feels much better s/p cath.    #Hyperlipidemia  LDL 77 on admission. Pt on Atorvastatin 80mg QD during stay and will c/w home Rosuvastatin 20 mg QD upon d/c

## 2020-11-03 NOTE — DISCHARGE NOTE PROVIDER - NSDCMRMEDTOKEN_GEN_ALL_CORE_FT
Aspirin Enteric Coated 81 mg oral delayed release tablet: 1 tab(s) orally once a day  dicyclomine 10 mg oral capsule: 1 cap(s) orally 2 times a day  Lantus 100 units/mL subcutaneous solution: 8 unit(s) subcutaneous once a day (at bedtime)  losartan-hydrochlorothiazide 100mg-12.5mg oral tablet: 1 tab(s) orally once a day  metFORMIN 1000 mg oral tablet: 1 tab(s) orally 2 times a day  nitroglycerin 0.4 mg sublingual tablet: 1 tab(s) sublingual every 5 minutes, As Needed  ondansetron 4 mg oral tablet: 1 tab(s) orally every 8 hours  Plavix 75 mg oral tablet: 1 tab(s) orally once a day  Protonix 40 mg oral delayed release tablet: 1 tab(s) orally once a day  rosuvastatin 20 mg oral tablet: 1 tab(s) orally once a day   Admelog SoloStar 100 units/mL injectable solution: 14 unit(s) injectable 3 times a day (with meals)   amLODIPine 5 mg oral tablet: 1 tab(s) orally once a day   Aspirin Enteric Coated 81 mg oral delayed release tablet: 1 tab(s) orally once a day  carvedilol 6.25 mg oral tablet: 1 tab(s) orally every 12 hours  dicyclomine 10 mg oral capsule: 1 cap(s) orally 2 times a day  Lantus Solostar Pen 100 units/mL subcutaneous solution: 40 unit(s) subcutaneous once a day (at bedtime)  nitroglycerin 0.4 mg sublingual tablet: 1 tab(s) sublingual every 5 minutes, As Needed  ondansetron 4 mg oral tablet: 1 tab(s) orally every 8 hours, As Needed  Plavix 75 mg oral tablet: 1 tab(s) orally once a day  Protonix 40 mg oral delayed release tablet: 1 tab(s) orally once a day  ramipril 2.5 mg oral capsule: 1 cap(s) orally once a day   rosuvastatin 20 mg oral tablet: 1 tab(s) orally once a day

## 2020-11-03 NOTE — DISCHARGE NOTE NURSING/CASE MANAGEMENT/SOCIAL WORK - HAS THE PATIENT RECEIVED THE INFLUENZA VACCINE THIS SEASON?
pt advised to continue abx, sxs that should prompt return explained to pt, verbalizes understanding. yes...

## 2020-11-03 NOTE — DISCHARGE NOTE PROVIDER - CARE PROVIDER_API CALL
Gen Patel)  Cardiology; Internal Medicine; Interventional Cardiology  [address] [address]  Phone: [phone]  Fax: [phone]  Follow Up Time: 1 week    Viridiana Bee  INTERNAL MEDICINE  [address], [address]  [place] [address]  Phone: [phone]  Fax: [phone]  Follow Up Time: 2 weeks    Gisela Duggan  CARDIOVASCULAR DISEASE  [address] [place] [address]  Phone: [phone]  Fax: [phone]  Follow Up Time: 1 week

## 2020-11-03 NOTE — PHYSICAL THERAPY INITIAL EVALUATION ADULT - PERTINENT HX OF CURRENT PROBLEM, REHAB EVAL
65F who presented to [facility] ED c/o dizziness and SOB x 2 days associated w/ chest tightness.

## 2020-11-03 NOTE — PROGRESS NOTE ADULT - ASSESSMENT
Assessment/Plan:   65F with CKD baseline Cr 1.2, DM, CAD, and GERD admitted for unstable angina, found to have KIKA (Cr 1.65). Pt is s/p cardiac cath on 11/2/20 with DERIK pRCA and DERIK Rancho. Nephrology consulted for KIKA.     #KIKA on CKD   -Creatine 1.65 on admission (baseline 1.2)   -No clear etiology based on history   -S/p cardiac cath on 11/2/20 with DERIK pRCA and DERIK Rancho  -Received NS @ 75cc/hr for 6hr pre- and post-contrast studies to reduce risk for RENATE  -Creatine overall improving since procedure (1.44 <-- 1.65)  -Trend BID BMP + urine electrolytes  -Renal sono     Thank you for the opportunity to participate in the care of your patient. The nephrology service remains available to assist with any questions or concerns. Please feel free to reach us by paging the on-call nephrology fellow for urgent issues or as below.     Chinedu Katz M.D.   PGY-4, Nephrology Fellow   C: [phone]   P: [phone]     Attending Attestation:   CKD with KIKA in setting of decreased intake , possible ACS  no sign CHF  cont IVf pre angio-- can dc IVF after angio protocol  follow renal fxn . Assessment/Plan:   65F with CKD baseline Cr 1.2, DM, CAD, and GERD admitted for unstable angina, found to have KIKA (Cr 1.65). Pt is s/p cardiac cath on 11/2/20 with DERIK pRCA and DERIK Rancho. Nephrology consulted for KIKA.     #KIKA on CKD   -Creatine 1.65 on admission (baseline 1.2)   -No clear etiology based on history   -S/p cardiac cath on 11/2/20 with DERIK pRCA and DERIK Rancho  -Received NS @ 75cc/hr for 6hr pre- and post-contrast studies to reduce risk for RENATE  -Creatine overall improving since procedure (1.44 <-- 1.65)  -Trend BID BMP + urine electrolytes  -Renal sono pending     Thank you for the opportunity to participate in the care of your patient. The nephrology service remains available to assist with any questions or concerns. Please feel free to reach us by paging the on-call nephrology fellow for urgent issues or as below.     Chinedu Katz M.D.   PGY-4, Nephrology Fellow   C: [phone]   P: [phone]   PGY-4, Nephrology Fellow   C: [phone]   P: [phone]     Attending Attestation:   CKD with KIKA in setting of decreased intake , possible ACS  no sign CHF  cont IVf pre angio-- can dc IVF after angio protocol  follow renal fxn .

## 2020-11-03 NOTE — PHYSICAL THERAPY INITIAL EVALUATION ADULT - DISCHARGE DISPOSITION, PT EVAL
home/outpatient PT/DISCHARGE INPATIENT PT secondary to patient .independent and at prior level of function

## 2020-11-03 NOTE — DISCHARGE NOTE NURSING/CASE MANAGEMENT/SOCIAL WORK - PATIENT PORTAL LINK FT
You can access the FollowMyHealth Patient Portal offered by [facility] by registering at the following website: [email or web address]. By joining [employer]’s FollowMyHealth portal, you will also be able to view your health information using other applications (apps) compatible with our system.

## 2020-11-06 LAB
CULTURE RESULTS: SIGNIFICANT CHANGE UP
CULTURE RESULTS: SIGNIFICANT CHANGE UP
SPECIMEN SOURCE: SIGNIFICANT CHANGE UP
SPECIMEN SOURCE: SIGNIFICANT CHANGE UP

## 2020-11-06 RX ORDER — RAMIPRIL 5 MG
1 CAPSULE ORAL
Qty: 30 | Refills: 0
Start: 2020-11-06 | End: 2020-12-05

## 2020-11-10 DIAGNOSIS — I25.110 ATHEROSCLEROTIC HEART DISEASE OF NATIVE CORONARY ARTERY WITH UNSTABLE ANGINA PECTORIS: ICD-10-CM

## 2020-11-10 DIAGNOSIS — Z87.442 PERSONAL HISTORY OF URINARY CALCULI: ICD-10-CM
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
## 2020-11-10 DIAGNOSIS — I27.20 PULMONARY HYPERTENSION, UNSPECIFIED: ICD-10-CM

## 2020-11-10 DIAGNOSIS — E11.65 TYPE 2 DIABETES MELLITUS WITH HYPERGLYCEMIA: ICD-10-CM

## 2020-11-10 DIAGNOSIS — E66.01 MORBID (SEVERE) OBESITY DUE TO EXCESS CALORIES: ICD-10-CM

## 2020-11-10 DIAGNOSIS — T38.3X5A ADVERSE EFFECT OF INSULIN AND ORAL HYPOGLYCEMIC [ANTIDIABETIC] DRUGS, INITIAL ENCOUNTER: ICD-10-CM

## 2020-11-10 DIAGNOSIS — N18.9 CHRONIC KIDNEY DISEASE, UNSPECIFIED: ICD-10-CM

## 2020-11-10 DIAGNOSIS — G47.33 OBSTRUCTIVE SLEEP APNEA (ADULT) (PEDIATRIC): ICD-10-CM

## 2020-11-10 DIAGNOSIS — Z79.82 LONG TERM (CURRENT) USE OF ASPIRIN: ICD-10-CM

## 2020-11-10 DIAGNOSIS — K58.9 IRRITABLE BOWEL SYNDROME WITHOUT DIARRHEA: ICD-10-CM

## 2020-11-10 DIAGNOSIS — I12.9 HYPERTENSIVE CHRONIC KIDNEY DISEASE WITH STAGE 1 THROUGH STAGE 4 CHRONIC KIDNEY DISEASE, OR UNSPECIFIED CHRONIC KIDNEY DISEASE: ICD-10-CM

## 2020-11-10 DIAGNOSIS — F41.9 ANXIETY DISORDER, UNSPECIFIED: ICD-10-CM

## 2020-11-10 DIAGNOSIS — Z79.02 LONG TERM (CURRENT) USE OF ANTITHROMBOTICS/ANTIPLATELETS: ICD-10-CM

## 2020-11-10 DIAGNOSIS — K21.9 GASTRO-ESOPHAGEAL REFLUX DISEASE WITHOUT ESOPHAGITIS: ICD-10-CM

## 2020-11-10 DIAGNOSIS — R55 SYNCOPE AND COLLAPSE: ICD-10-CM

## 2020-11-10 DIAGNOSIS — F17.210 NICOTINE DEPENDENCE, CIGARETTES, UNCOMPLICATED: ICD-10-CM

## 2020-11-10 DIAGNOSIS — E11.22 TYPE 2 DIABETES MELLITUS WITH DIABETIC CHRONIC KIDNEY DISEASE: ICD-10-CM

## 2020-11-10 DIAGNOSIS — Z79.4 LONG TERM (CURRENT) USE OF INSULIN: ICD-10-CM

## 2020-11-10 DIAGNOSIS — I25.2 OLD MYOCARDIAL INFARCTION: ICD-10-CM

## 2020-11-10 DIAGNOSIS — Z95.5 PRESENCE OF CORONARY ANGIOPLASTY IMPLANT AND GRAFT: ICD-10-CM

## 2020-11-10 DIAGNOSIS — Z88.8 ALLERGY STATUS TO OTHER DRUGS, MEDICAMENTS AND BIOLOGICAL SUBSTANCES: ICD-10-CM

## 2020-11-10 DIAGNOSIS — N17.9 ACUTE KIDNEY FAILURE, UNSPECIFIED: ICD-10-CM

## 2020-11-10 DIAGNOSIS — E87.2 ACIDOSIS: ICD-10-CM

## 2020-11-10 DIAGNOSIS — E78.5 HYPERLIPIDEMIA, UNSPECIFIED: ICD-10-CM

## 2020-11-17 PROCEDURE — 83880 ASSAY OF NATRIURETIC PEPTIDE: CPT

## 2020-11-17 PROCEDURE — 99291 CRITICAL CARE FIRST HOUR: CPT | Mod: 25

## 2020-11-17 PROCEDURE — 82962 GLUCOSE BLOOD TEST: CPT

## 2020-11-17 PROCEDURE — 80048 BASIC METABOLIC PNL TOTAL CA: CPT

## 2020-11-17 PROCEDURE — 82330 ASSAY OF CALCIUM: CPT

## 2020-11-17 PROCEDURE — C1874: CPT

## 2020-11-17 PROCEDURE — 82803 BLOOD GASES ANY COMBINATION: CPT

## 2020-11-17 PROCEDURE — 93306 TTE W/DOPPLER COMPLETE: CPT

## 2020-11-17 PROCEDURE — 86803 HEPATITIS C AB TEST: CPT

## 2020-11-17 PROCEDURE — C1894: CPT

## 2020-11-17 PROCEDURE — 83735 ASSAY OF MAGNESIUM: CPT

## 2020-11-17 PROCEDURE — 97161 PT EVAL LOW COMPLEX 20 MIN: CPT

## 2020-11-17 PROCEDURE — 80061 LIPID PANEL: CPT

## 2020-11-17 PROCEDURE — 86769 SARS-COV-2 COVID-19 ANTIBODY: CPT

## 2020-11-17 PROCEDURE — 84295 ASSAY OF SERUM SODIUM: CPT

## 2020-11-17 PROCEDURE — 85610 PROTHROMBIN TIME: CPT

## 2020-11-17 PROCEDURE — 83605 ASSAY OF LACTIC ACID: CPT

## 2020-11-17 PROCEDURE — 87086 URINE CULTURE/COLONY COUNT: CPT

## 2020-11-17 PROCEDURE — 84484 ASSAY OF TROPONIN QUANT: CPT

## 2020-11-17 PROCEDURE — 85730 THROMBOPLASTIN TIME PARTIAL: CPT

## 2020-11-17 PROCEDURE — 85027 COMPLETE CBC AUTOMATED: CPT

## 2020-11-17 PROCEDURE — U0003: CPT

## 2020-11-17 PROCEDURE — 85025 COMPLETE CBC W/AUTO DIFF WBC: CPT

## 2020-11-17 PROCEDURE — 83036 HEMOGLOBIN GLYCOSYLATED A1C: CPT

## 2020-11-17 PROCEDURE — 96374 THER/PROPH/DIAG INJ IV PUSH: CPT | Mod: XU

## 2020-11-17 PROCEDURE — 82553 CREATINE MB FRACTION: CPT

## 2020-11-17 PROCEDURE — 81001 URINALYSIS AUTO W/SCOPE: CPT

## 2020-11-17 PROCEDURE — 94660 CPAP INITIATION&MGMT: CPT

## 2020-11-17 PROCEDURE — 36415 COLL VENOUS BLD VENIPUNCTURE: CPT

## 2020-11-17 PROCEDURE — 94640 AIRWAY INHALATION TREATMENT: CPT

## 2020-11-17 PROCEDURE — 80053 COMPREHEN METABOLIC PANEL: CPT

## 2020-11-17 PROCEDURE — C1725: CPT

## 2020-11-17 PROCEDURE — 82550 ASSAY OF CK (CPK): CPT

## 2020-11-17 PROCEDURE — 96375 TX/PRO/DX INJ NEW DRUG ADDON: CPT | Mod: XU

## 2020-11-17 PROCEDURE — 71045 X-RAY EXAM CHEST 1 VIEW: CPT

## 2020-11-17 PROCEDURE — C1769: CPT

## 2020-11-17 PROCEDURE — 84132 ASSAY OF SERUM POTASSIUM: CPT

## 2020-11-17 PROCEDURE — 71275 CT ANGIOGRAPHY CHEST: CPT

## 2020-11-17 PROCEDURE — 84681 ASSAY OF C-PEPTIDE: CPT

## 2020-11-17 PROCEDURE — 87040 BLOOD CULTURE FOR BACTERIA: CPT

## 2020-11-17 PROCEDURE — C1887: CPT

## 2020-11-27 LAB
CULTURE RESULTS: NO GROWTH — SIGNIFICANT CHANGE UP
SPECIMEN SOURCE: SIGNIFICANT CHANGE UP

## 2021-01-22 NOTE — ED PROVIDER NOTE - NS_EDPROVIDERDISPOUSERTYPE_ED_A_ED
Scribe Attestation (For Scribes USE Only)... High Risk (score 12 or above) Attending Attestation (For Attendings USE Only).../Scribe Attestation (For Scribes USE Only)...
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
## 2023-05-25 VITALS
WEIGHT: 240.08 LBS | RESPIRATION RATE: 24 BRPM | SYSTOLIC BLOOD PRESSURE: 133 MMHG | DIASTOLIC BLOOD PRESSURE: 70 MMHG | OXYGEN SATURATION: 97 % | HEIGHT: 67 IN | TEMPERATURE: 98 F | HEART RATE: 60 BPM

## 2023-05-25 LAB
ALBUMIN SERPL ELPH-MCNC: 4 G/DL — SIGNIFICANT CHANGE UP (ref 3.3–5)
ALP SERPL-CCNC: 146 U/L — HIGH (ref 40–120)
ALT FLD-CCNC: 109 U/L — HIGH (ref 10–45)
ANION GAP SERPL CALC-SCNC: 9 MMOL/L — SIGNIFICANT CHANGE UP (ref 5–17)
APTT BLD: 31.3 SEC — SIGNIFICANT CHANGE UP (ref 27.5–35.5)
AST SERPL-CCNC: 84 U/L — HIGH (ref 10–40)
BASE EXCESS BLDV CALC-SCNC: -0.2 MMOL/L — SIGNIFICANT CHANGE UP (ref -2–3)
BASOPHILS # BLD AUTO: 0.04 K/UL — SIGNIFICANT CHANGE UP (ref 0–0.2)
BASOPHILS NFR BLD AUTO: 0.6 % — SIGNIFICANT CHANGE UP (ref 0–2)
BILIRUB SERPL-MCNC: 0.4 MG/DL — SIGNIFICANT CHANGE UP (ref 0.2–1.2)
BUN SERPL-MCNC: 22 MG/DL — SIGNIFICANT CHANGE UP (ref 7–23)
CA-I SERPL-SCNC: 1.24 MMOL/L — SIGNIFICANT CHANGE UP (ref 1.15–1.33)
CALCIUM SERPL-MCNC: 9.3 MG/DL — SIGNIFICANT CHANGE UP (ref 8.4–10.5)
CHLORIDE SERPL-SCNC: 111 MMOL/L — HIGH (ref 96–108)
CO2 BLDV-SCNC: 26.1 MMOL/L — HIGH (ref 22–26)
CO2 SERPL-SCNC: 23 MMOL/L — SIGNIFICANT CHANGE UP (ref 22–31)
CREAT SERPL-MCNC: 1.48 MG/DL — HIGH (ref 0.5–1.3)
EGFR: 39 ML/MIN/1.73M2 — LOW
EOSINOPHIL # BLD AUTO: 0.16 K/UL — SIGNIFICANT CHANGE UP (ref 0–0.5)
EOSINOPHIL NFR BLD AUTO: 2.6 % — SIGNIFICANT CHANGE UP (ref 0–6)
GAS PNL BLDV: 143 MMOL/L — SIGNIFICANT CHANGE UP (ref 136–145)
GAS PNL BLDV: SIGNIFICANT CHANGE UP
GLUCOSE SERPL-MCNC: 104 MG/DL — HIGH (ref 70–99)
HCO3 BLDV-SCNC: 25 MMOL/L — SIGNIFICANT CHANGE UP (ref 22–29)
HCT VFR BLD CALC: 42.4 % — SIGNIFICANT CHANGE UP (ref 34.5–45)
HGB BLD-MCNC: 13.9 G/DL — SIGNIFICANT CHANGE UP (ref 11.5–15.5)
IMM GRANULOCYTES NFR BLD AUTO: 0.3 % — SIGNIFICANT CHANGE UP (ref 0–0.9)
INR BLD: 1.42 — HIGH (ref 0.88–1.16)
LYMPHOCYTES # BLD AUTO: 1.7 K/UL — SIGNIFICANT CHANGE UP (ref 1–3.3)
LYMPHOCYTES # BLD AUTO: 27.3 % — SIGNIFICANT CHANGE UP (ref 13–44)
MCHC RBC-ENTMCNC: 29.5 PG — SIGNIFICANT CHANGE UP (ref 27–34)
MCHC RBC-ENTMCNC: 32.8 GM/DL — SIGNIFICANT CHANGE UP (ref 32–36)
MCV RBC AUTO: 90 FL — SIGNIFICANT CHANGE UP (ref 80–100)
MONOCYTES # BLD AUTO: 0.37 K/UL — SIGNIFICANT CHANGE UP (ref 0–0.9)
MONOCYTES NFR BLD AUTO: 5.9 % — SIGNIFICANT CHANGE UP (ref 2–14)
NEUTROPHILS # BLD AUTO: 3.93 K/UL — SIGNIFICANT CHANGE UP (ref 1.8–7.4)
NEUTROPHILS NFR BLD AUTO: 63.3 % — SIGNIFICANT CHANGE UP (ref 43–77)
NRBC # BLD: 0 /100 WBCS — SIGNIFICANT CHANGE UP (ref 0–0)
NT-PROBNP SERPL-SCNC: 108 PG/ML — SIGNIFICANT CHANGE UP (ref 0–300)
PCO2 BLDV: 41 MMHG — SIGNIFICANT CHANGE UP (ref 39–42)
PH BLDV: 7.39 — SIGNIFICANT CHANGE UP (ref 7.32–7.43)
PLATELET # BLD AUTO: 221 K/UL — SIGNIFICANT CHANGE UP (ref 150–400)
PO2 BLDV: 67 MMHG — HIGH (ref 25–45)
POTASSIUM BLDV-SCNC: 4.4 MMOL/L — SIGNIFICANT CHANGE UP (ref 3.5–5.1)
POTASSIUM SERPL-MCNC: 4.2 MMOL/L — SIGNIFICANT CHANGE UP (ref 3.5–5.3)
POTASSIUM SERPL-SCNC: 4.2 MMOL/L — SIGNIFICANT CHANGE UP (ref 3.5–5.3)
PROT SERPL-MCNC: 7.7 G/DL — SIGNIFICANT CHANGE UP (ref 6–8.3)
PROTHROM AB SERPL-ACNC: 16.9 SEC — HIGH (ref 10.5–13.4)
RBC # BLD: 4.71 M/UL — SIGNIFICANT CHANGE UP (ref 3.8–5.2)
RBC # FLD: 13 % — SIGNIFICANT CHANGE UP (ref 10.3–14.5)
SAO2 % BLDV: 95.2 % — HIGH (ref 67–88)
SODIUM SERPL-SCNC: 143 MMOL/L — SIGNIFICANT CHANGE UP (ref 135–145)
TROPONIN T SERPL-MCNC: <0.01 NG/ML — SIGNIFICANT CHANGE UP (ref 0–0.01)
TROPONIN T SERPL-MCNC: <0.01 NG/ML — SIGNIFICANT CHANGE UP (ref 0–0.01)
WBC # BLD: 6.22 K/UL — SIGNIFICANT CHANGE UP (ref 3.8–10.5)
WBC # FLD AUTO: 6.22 K/UL — SIGNIFICANT CHANGE UP (ref 3.8–10.5)

## 2023-05-25 PROCEDURE — 99285 EMERGENCY DEPT VISIT HI MDM: CPT

## 2023-05-25 PROCEDURE — 71045 X-RAY EXAM CHEST 1 VIEW: CPT | Mod: 26

## 2023-05-25 RX ORDER — MORPHINE SULFATE 50 MG/1
4 CAPSULE, EXTENDED RELEASE ORAL ONCE
Refills: 0 | Status: DISCONTINUED | OUTPATIENT
Start: 2023-05-25 | End: 2023-05-25

## 2023-05-25 RX ORDER — NITROGLYCERIN 6.5 MG
0.4 CAPSULE, EXTENDED RELEASE ORAL
Refills: 0 | Status: DISCONTINUED | OUTPATIENT
Start: 2023-05-25 | End: 2023-05-27

## 2023-05-25 RX ORDER — ASPIRIN/CALCIUM CARB/MAGNESIUM 324 MG
162 TABLET ORAL DAILY
Refills: 0 | Status: DISCONTINUED | OUTPATIENT
Start: 2023-05-25 | End: 2023-05-26

## 2023-05-25 RX ADMIN — Medication 162 MILLIGRAM(S): at 21:55

## 2023-05-25 RX ADMIN — Medication 0.4 MILLIGRAM(S): at 21:55

## 2023-05-25 RX ADMIN — MORPHINE SULFATE 4 MILLIGRAM(S): 50 CAPSULE, EXTENDED RELEASE ORAL at 22:10

## 2023-05-25 RX ADMIN — MORPHINE SULFATE 4 MILLIGRAM(S): 50 CAPSULE, EXTENDED RELEASE ORAL at 21:40

## 2023-05-25 RX ADMIN — MORPHINE SULFATE 4 MILLIGRAM(S): 50 CAPSULE, EXTENDED RELEASE ORAL at 23:08

## 2023-05-25 NOTE — ED ADULT NURSE NOTE - OBJECTIVE STATEMENT
Pt presents to ED with shortness of breath and chest pain x 2 days. Pt feels "pressure" on her chest on the left hand side. Pt states "walking has been more difficult because of my shortness of breath." Denies fevers/chills, nausea/vomiting, diarrhea. EKG obtained. Placed on cardiac monitor. Hx 2 stents. Ambulates with walker.

## 2023-05-25 NOTE — ED ADULT NURSE REASSESSMENT NOTE - NS ED NURSE REASSESS COMMENT FT1
Pt had run of V tach. Pt complained of dizziness and chest pressure during this event. MD Acosta aware. Pt moved to [facility]. 2nd PIV placed. Placed on cardiac monitor in [facility]. EKG obtained.

## 2023-05-25 NOTE — ED ADULT NURSE NOTE - NSFALLUNIVINTERV_ED_ALL_ED
Bed/Stretcher in lowest position, wheels locked, appropriate side rails in place/Call bell, personal items and telephone in reach/Instruct patient to call for assistance before getting out of bed/chair/stretcher/Non-slip footwear applied when patient is off stretcher/[place] to call system/Physically safe environment - no spills, clutter or unnecessary equipment/Purposeful proactive rounding/Room/bathroom lighting operational, light cord in reach

## 2023-05-26 ENCOUNTER — INPATIENT (INPATIENT)
Facility: HOSPITAL | Age: 68
LOS: 0 days | Discharge: HOME | DRG: 287 | End: 2023-05-27
Attending: INTERNAL MEDICINE | Admitting: INTERNAL MEDICINE
Payer: MEDICARE

## 2023-05-26 DIAGNOSIS — Z98.891 HISTORY OF UTERINE SCAR FROM PREVIOUS SURGERY: Chronic | ICD-10-CM

## 2023-05-26 DIAGNOSIS — Z86.79 PERSONAL HISTORY OF OTHER DISEASES OF THE CIRCULATORY SYSTEM: ICD-10-CM

## 2023-05-26 DIAGNOSIS — R63.8 OTHER SYMPTOMS AND SIGNS CONCERNING FOOD AND FLUID INTAKE: ICD-10-CM

## 2023-05-26 DIAGNOSIS — Z95.5 PRESENCE OF CORONARY ANGIOPLASTY IMPLANT AND GRAFT: Chronic | ICD-10-CM

## 2023-05-26 DIAGNOSIS — E78.5 HYPERLIPIDEMIA, UNSPECIFIED: ICD-10-CM

## 2023-05-26 DIAGNOSIS — E11.9 TYPE 2 DIABETES MELLITUS WITHOUT COMPLICATIONS: ICD-10-CM

## 2023-05-26 DIAGNOSIS — N18.4 CHRONIC KIDNEY DISEASE, STAGE 4 (SEVERE): ICD-10-CM

## 2023-05-26 DIAGNOSIS — I24.9 ACUTE ISCHEMIC HEART DISEASE, UNSPECIFIED: ICD-10-CM

## 2023-05-26 DIAGNOSIS — I10 ESSENTIAL (PRIMARY) HYPERTENSION: ICD-10-CM

## 2023-05-26 DIAGNOSIS — K21.9 GASTRO-ESOPHAGEAL REFLUX DISEASE WITHOUT ESOPHAGITIS: ICD-10-CM

## 2023-05-26 DIAGNOSIS — I25.10 ATHEROSCLEROTIC HEART DISEASE OF NATIVE CORONARY ARTERY WITHOUT ANGINA PECTORIS: ICD-10-CM

## 2023-05-26 DIAGNOSIS — R74.8 ABNORMAL LEVELS OF OTHER SERUM ENZYMES: ICD-10-CM

## 2023-05-26 LAB
A1C WITH ESTIMATED AVERAGE GLUCOSE RESULT: 8 % — HIGH (ref 4–5.6)
ALBUMIN SERPL ELPH-MCNC: 3.6 G/DL — SIGNIFICANT CHANGE UP (ref 3.3–5)
ALBUMIN SERPL ELPH-MCNC: 3.6 G/DL — SIGNIFICANT CHANGE UP (ref 3.3–5)
ALP SERPL-CCNC: 125 U/L — HIGH (ref 40–120)
ALP SERPL-CCNC: 132 U/L — HIGH (ref 40–120)
ALT FLD-CCNC: 80 U/L — HIGH (ref 10–45)
ALT FLD-CCNC: 82 U/L — HIGH (ref 10–45)
ANION GAP SERPL CALC-SCNC: 7 MMOL/L — SIGNIFICANT CHANGE UP (ref 5–17)
ANION GAP SERPL CALC-SCNC: 7 MMOL/L — SIGNIFICANT CHANGE UP (ref 5–17)
APTT BLD: 30 SEC — SIGNIFICANT CHANGE UP (ref 27.5–35.5)
APTT BLD: 32.1 SEC — SIGNIFICANT CHANGE UP (ref 27.5–35.5)
AST SERPL-CCNC: 49 U/L — HIGH (ref 10–40)
AST SERPL-CCNC: 53 U/L — HIGH (ref 10–40)
BASOPHILS # BLD AUTO: 0.03 K/UL — SIGNIFICANT CHANGE UP (ref 0–0.2)
BASOPHILS NFR BLD AUTO: 0.5 % — SIGNIFICANT CHANGE UP (ref 0–2)
BILIRUB SERPL-MCNC: 0.3 MG/DL — SIGNIFICANT CHANGE UP (ref 0.2–1.2)
BILIRUB SERPL-MCNC: 0.4 MG/DL — SIGNIFICANT CHANGE UP (ref 0.2–1.2)
BUN SERPL-MCNC: 24 MG/DL — HIGH (ref 7–23)
BUN SERPL-MCNC: 24 MG/DL — HIGH (ref 7–23)
CALCIUM SERPL-MCNC: 8.5 MG/DL — SIGNIFICANT CHANGE UP (ref 8.4–10.5)
CALCIUM SERPL-MCNC: 8.9 MG/DL — SIGNIFICANT CHANGE UP (ref 8.4–10.5)
CHLORIDE SERPL-SCNC: 103 MMOL/L — SIGNIFICANT CHANGE UP (ref 96–108)
CHLORIDE SERPL-SCNC: 110 MMOL/L — HIGH (ref 96–108)
CK MB CFR SERPL CALC: 2.1 NG/ML — SIGNIFICANT CHANGE UP (ref 0–6.7)
CK SERPL-CCNC: 125 U/L — SIGNIFICANT CHANGE UP (ref 25–170)
CO2 SERPL-SCNC: 21 MMOL/L — LOW (ref 22–31)
CO2 SERPL-SCNC: 24 MMOL/L — SIGNIFICANT CHANGE UP (ref 22–31)
CREAT SERPL-MCNC: 1.23 MG/DL — SIGNIFICANT CHANGE UP (ref 0.5–1.3)
CREAT SERPL-MCNC: 1.28 MG/DL — SIGNIFICANT CHANGE UP (ref 0.5–1.3)
EGFR: 46 ML/MIN/1.73M2 — LOW
EGFR: 48 ML/MIN/1.73M2 — LOW
EOSINOPHIL # BLD AUTO: 0.18 K/UL — SIGNIFICANT CHANGE UP (ref 0–0.5)
EOSINOPHIL NFR BLD AUTO: 2.9 % — SIGNIFICANT CHANGE UP (ref 0–6)
ESTIMATED AVERAGE GLUCOSE: 183 MG/DL — HIGH (ref 68–114)
GLUCOSE BLDC GLUCOMTR-MCNC: 153 MG/DL — HIGH (ref 70–99)
GLUCOSE BLDC GLUCOMTR-MCNC: 66 MG/DL — LOW (ref 70–99)
GLUCOSE BLDC GLUCOMTR-MCNC: 74 MG/DL — SIGNIFICANT CHANGE UP (ref 70–99)
GLUCOSE BLDC GLUCOMTR-MCNC: 84 MG/DL — SIGNIFICANT CHANGE UP (ref 70–99)
GLUCOSE SERPL-MCNC: 82 MG/DL — SIGNIFICANT CHANGE UP (ref 70–99)
GLUCOSE SERPL-MCNC: 90 MG/DL — SIGNIFICANT CHANGE UP (ref 70–99)
HCT VFR BLD CALC: 40 % — SIGNIFICANT CHANGE UP (ref 34.5–45)
HGB BLD-MCNC: 12.6 G/DL — SIGNIFICANT CHANGE UP (ref 11.5–15.5)
IMM GRANULOCYTES NFR BLD AUTO: 0.2 % — SIGNIFICANT CHANGE UP (ref 0–0.9)
INR BLD: 1.32 — HIGH (ref 0.88–1.16)
INR BLD: 1.36 — HIGH (ref 0.88–1.16)
LYMPHOCYTES # BLD AUTO: 1.73 K/UL — SIGNIFICANT CHANGE UP (ref 1–3.3)
LYMPHOCYTES # BLD AUTO: 27.5 % — SIGNIFICANT CHANGE UP (ref 13–44)
MAGNESIUM SERPL-MCNC: 1.9 MG/DL — SIGNIFICANT CHANGE UP (ref 1.6–2.6)
MAGNESIUM SERPL-MCNC: 2 MG/DL — SIGNIFICANT CHANGE UP (ref 1.6–2.6)
MCHC RBC-ENTMCNC: 29.2 PG — SIGNIFICANT CHANGE UP (ref 27–34)
MCHC RBC-ENTMCNC: 31.5 GM/DL — LOW (ref 32–36)
MCV RBC AUTO: 92.6 FL — SIGNIFICANT CHANGE UP (ref 80–100)
MONOCYTES # BLD AUTO: 0.4 K/UL — SIGNIFICANT CHANGE UP (ref 0–0.9)
MONOCYTES NFR BLD AUTO: 6.3 % — SIGNIFICANT CHANGE UP (ref 2–14)
NEUTROPHILS # BLD AUTO: 3.95 K/UL — SIGNIFICANT CHANGE UP (ref 1.8–7.4)
NEUTROPHILS NFR BLD AUTO: 62.6 % — SIGNIFICANT CHANGE UP (ref 43–77)
NRBC # BLD: 0 /100 WBCS — SIGNIFICANT CHANGE UP (ref 0–0)
PHOSPHATE SERPL-MCNC: 3.4 MG/DL — SIGNIFICANT CHANGE UP (ref 2.5–4.5)
PLATELET # BLD AUTO: 202 K/UL — SIGNIFICANT CHANGE UP (ref 150–400)
POTASSIUM SERPL-MCNC: 3.9 MMOL/L — SIGNIFICANT CHANGE UP (ref 3.5–5.3)
POTASSIUM SERPL-MCNC: 4.3 MMOL/L — SIGNIFICANT CHANGE UP (ref 3.5–5.3)
POTASSIUM SERPL-SCNC: 3.9 MMOL/L — SIGNIFICANT CHANGE UP (ref 3.5–5.3)
POTASSIUM SERPL-SCNC: 4.3 MMOL/L — SIGNIFICANT CHANGE UP (ref 3.5–5.3)
PROT SERPL-MCNC: 7.2 G/DL — SIGNIFICANT CHANGE UP (ref 6–8.3)
PROT SERPL-MCNC: 7.3 G/DL — SIGNIFICANT CHANGE UP (ref 6–8.3)
PROTHROM AB SERPL-ACNC: 15.7 SEC — HIGH (ref 10.5–13.4)
PROTHROM AB SERPL-ACNC: 16.2 SEC — HIGH (ref 10.5–13.4)
RBC # BLD: 4.32 M/UL — SIGNIFICANT CHANGE UP (ref 3.8–5.2)
RBC # FLD: 13.2 % — SIGNIFICANT CHANGE UP (ref 10.3–14.5)
SODIUM SERPL-SCNC: 131 MMOL/L — LOW (ref 135–145)
SODIUM SERPL-SCNC: 141 MMOL/L — SIGNIFICANT CHANGE UP (ref 135–145)
TROPONIN T SERPL-MCNC: <0.01 NG/ML — SIGNIFICANT CHANGE UP (ref 0–0.01)
WBC # BLD: 6.3 K/UL — SIGNIFICANT CHANGE UP (ref 3.8–10.5)
WBC # FLD AUTO: 6.3 K/UL — SIGNIFICANT CHANGE UP (ref 3.8–10.5)

## 2023-05-26 PROCEDURE — 99222 1ST HOSP IP/OBS MODERATE 55: CPT

## 2023-05-26 PROCEDURE — 93306 TTE W/DOPPLER COMPLETE: CPT | Mod: 26

## 2023-05-26 PROCEDURE — 93458 L HRT ARTERY/VENTRICLE ANGIO: CPT | Mod: 26

## 2023-05-26 PROCEDURE — 93010 ELECTROCARDIOGRAM REPORT: CPT

## 2023-05-26 PROCEDURE — 71275 CT ANGIOGRAPHY CHEST: CPT | Mod: 26,MA

## 2023-05-26 PROCEDURE — 99152 MOD SED SAME PHYS/QHP 5/>YRS: CPT

## 2023-05-26 RX ORDER — SODIUM CHLORIDE 9 MG/ML
500 INJECTION INTRAMUSCULAR; INTRAVENOUS; SUBCUTANEOUS
Refills: 0 | Status: DISCONTINUED | OUTPATIENT
Start: 2023-05-26 | End: 2023-05-27

## 2023-05-26 RX ORDER — INSULIN GLARGINE 100 [IU]/ML
10 INJECTION, SOLUTION SUBCUTANEOUS
Refills: 0 | DISCHARGE

## 2023-05-26 RX ORDER — NITROGLYCERIN 6.5 MG
1 CAPSULE, EXTENDED RELEASE ORAL
Qty: 0 | Refills: 0 | DISCHARGE

## 2023-05-26 RX ORDER — CHLORHEXIDINE GLUCONATE 213 G/1000ML
1 SOLUTION TOPICAL ONCE
Refills: 0 | Status: DISCONTINUED | OUTPATIENT
Start: 2023-05-26 | End: 2023-05-27

## 2023-05-26 RX ORDER — SODIUM CHLORIDE 9 MG/ML
1000 INJECTION, SOLUTION INTRAVENOUS
Refills: 0 | Status: DISCONTINUED | OUTPATIENT
Start: 2023-05-26 | End: 2023-05-27

## 2023-05-26 RX ORDER — ASPIRIN/CALCIUM CARB/MAGNESIUM 324 MG
81 TABLET ORAL DAILY
Refills: 0 | Status: DISCONTINUED | OUTPATIENT
Start: 2023-05-26 | End: 2023-05-27

## 2023-05-26 RX ORDER — RAMIPRIL 5 MG
0 CAPSULE ORAL
Refills: 0 | DISCHARGE

## 2023-05-26 RX ORDER — PANTOPRAZOLE SODIUM 20 MG/1
1 TABLET, DELAYED RELEASE ORAL
Qty: 0 | Refills: 0 | DISCHARGE

## 2023-05-26 RX ORDER — CLOPIDOGREL BISULFATE 75 MG/1
600 TABLET, FILM COATED ORAL ONCE
Refills: 0 | Status: COMPLETED | OUTPATIENT
Start: 2023-05-26 | End: 2023-05-26

## 2023-05-26 RX ORDER — MAGNESIUM SULFATE 500 MG/ML
1 VIAL (ML) INJECTION ONCE
Refills: 0 | Status: COMPLETED | OUTPATIENT
Start: 2023-05-26 | End: 2023-05-26

## 2023-05-26 RX ORDER — LISINOPRIL 2.5 MG/1
40 TABLET ORAL DAILY
Refills: 0 | Status: DISCONTINUED | OUTPATIENT
Start: 2023-05-26 | End: 2023-05-27

## 2023-05-26 RX ORDER — POTASSIUM CHLORIDE 20 MEQ
10 PACKET (EA) ORAL ONCE
Refills: 0 | Status: COMPLETED | OUTPATIENT
Start: 2023-05-26 | End: 2023-05-26

## 2023-05-26 RX ORDER — ROSUVASTATIN CALCIUM 5 MG/1
1 TABLET ORAL
Qty: 0 | Refills: 0 | DISCHARGE

## 2023-05-26 RX ORDER — GLUCAGON INJECTION, SOLUTION 0.5 MG/.1ML
1 INJECTION, SOLUTION SUBCUTANEOUS ONCE
Refills: 0 | Status: DISCONTINUED | OUTPATIENT
Start: 2023-05-26 | End: 2023-05-27

## 2023-05-26 RX ORDER — DEXTROSE 50 % IN WATER 50 %
25 SYRINGE (ML) INTRAVENOUS ONCE
Refills: 0 | Status: DISCONTINUED | OUTPATIENT
Start: 2023-05-26 | End: 2023-05-27

## 2023-05-26 RX ORDER — ACETAMINOPHEN 500 MG
650 TABLET ORAL EVERY 6 HOURS
Refills: 0 | Status: DISCONTINUED | OUTPATIENT
Start: 2023-05-26 | End: 2023-05-27

## 2023-05-26 RX ORDER — AMLODIPINE BESYLATE 2.5 MG/1
5 TABLET ORAL DAILY
Refills: 0 | Status: DISCONTINUED | OUTPATIENT
Start: 2023-05-26 | End: 2023-05-27

## 2023-05-26 RX ORDER — DEXTROSE 50 % IN WATER 50 %
15 SYRINGE (ML) INTRAVENOUS ONCE
Refills: 0 | Status: DISCONTINUED | OUTPATIENT
Start: 2023-05-26 | End: 2023-05-27

## 2023-05-26 RX ORDER — DEXTROSE 50 % IN WATER 50 %
12.5 SYRINGE (ML) INTRAVENOUS ONCE
Refills: 0 | Status: DISCONTINUED | OUTPATIENT
Start: 2023-05-26 | End: 2023-05-27

## 2023-05-26 RX ORDER — ONDANSETRON 8 MG/1
1 TABLET, FILM COATED ORAL
Qty: 0 | Refills: 0 | DISCHARGE

## 2023-05-26 RX ORDER — INSULIN LISPRO 100/ML
VIAL (ML) SUBCUTANEOUS
Refills: 0 | Status: DISCONTINUED | OUTPATIENT
Start: 2023-05-26 | End: 2023-05-27

## 2023-05-26 RX ORDER — INSULIN GLARGINE 100 [IU]/ML
10 INJECTION, SOLUTION SUBCUTANEOUS EVERY MORNING
Refills: 0 | Status: DISCONTINUED | OUTPATIENT
Start: 2023-05-26 | End: 2023-05-27

## 2023-05-26 RX ORDER — CLOPIDOGREL BISULFATE 75 MG/1
75 TABLET, FILM COATED ORAL DAILY
Refills: 0 | Status: DISCONTINUED | OUTPATIENT
Start: 2023-05-27 | End: 2023-05-27

## 2023-05-26 RX ORDER — ATORVASTATIN CALCIUM 80 MG/1
80 TABLET, FILM COATED ORAL AT BEDTIME
Refills: 0 | Status: DISCONTINUED | OUTPATIENT
Start: 2023-05-26 | End: 2023-05-27

## 2023-05-26 RX ORDER — SODIUM CHLORIDE 9 MG/ML
250 INJECTION INTRAMUSCULAR; INTRAVENOUS; SUBCUTANEOUS ONCE
Refills: 0 | Status: COMPLETED | OUTPATIENT
Start: 2023-05-26 | End: 2023-05-26

## 2023-05-26 RX ORDER — HEPARIN SODIUM 5000 [USP'U]/ML
7500 INJECTION INTRAVENOUS; SUBCUTANEOUS EVERY 12 HOURS
Refills: 0 | Status: DISCONTINUED | OUTPATIENT
Start: 2023-05-26 | End: 2023-05-26

## 2023-05-26 RX ORDER — INSULIN GLARGINE 100 [IU]/ML
36 INJECTION, SOLUTION SUBCUTANEOUS
Refills: 0 | DISCHARGE

## 2023-05-26 RX ORDER — DAPAGLIFLOZIN 10 MG/1
1 TABLET, FILM COATED ORAL
Refills: 0 | DISCHARGE

## 2023-05-26 RX ORDER — PANTOPRAZOLE SODIUM 20 MG/1
40 TABLET, DELAYED RELEASE ORAL
Refills: 0 | Status: DISCONTINUED | OUTPATIENT
Start: 2023-05-26 | End: 2023-05-27

## 2023-05-26 RX ORDER — INSULIN GLARGINE 100 [IU]/ML
5 INJECTION, SOLUTION SUBCUTANEOUS AT BEDTIME
Refills: 0 | Status: DISCONTINUED | OUTPATIENT
Start: 2023-05-26 | End: 2023-05-27

## 2023-05-26 RX ORDER — DAPAGLIFLOZIN 10 MG/1
10 TABLET, FILM COATED ORAL EVERY 24 HOURS
Refills: 0 | Status: DISCONTINUED | OUTPATIENT
Start: 2023-05-26 | End: 2023-05-27

## 2023-05-26 RX ADMIN — Medication 650 MILLIGRAM(S): at 22:24

## 2023-05-26 RX ADMIN — Medication 81 MILLIGRAM(S): at 14:14

## 2023-05-26 RX ADMIN — Medication 650 MILLIGRAM(S): at 22:25

## 2023-05-26 RX ADMIN — MORPHINE SULFATE 4 MILLIGRAM(S): 50 CAPSULE, EXTENDED RELEASE ORAL at 00:00

## 2023-05-26 RX ADMIN — INSULIN GLARGINE 5 UNIT(S): 100 INJECTION, SOLUTION SUBCUTANEOUS at 22:23

## 2023-05-26 RX ADMIN — SODIUM CHLORIDE 250 MILLILITER(S): 9 INJECTION INTRAMUSCULAR; INTRAVENOUS; SUBCUTANEOUS at 14:15

## 2023-05-26 RX ADMIN — Medication 2: at 22:22

## 2023-05-26 RX ADMIN — Medication 100 GRAM(S): at 14:14

## 2023-05-26 RX ADMIN — ATORVASTATIN CALCIUM 80 MILLIGRAM(S): 80 TABLET, FILM COATED ORAL at 22:24

## 2023-05-26 RX ADMIN — AMLODIPINE BESYLATE 5 MILLIGRAM(S): 2.5 TABLET ORAL at 10:35

## 2023-05-26 RX ADMIN — SODIUM CHLORIDE 75 MILLILITER(S): 9 INJECTION INTRAMUSCULAR; INTRAVENOUS; SUBCUTANEOUS at 14:15

## 2023-05-26 RX ADMIN — Medication 10 MILLIEQUIVALENT(S): at 10:35

## 2023-05-26 RX ADMIN — CLOPIDOGREL BISULFATE 600 MILLIGRAM(S): 75 TABLET, FILM COATED ORAL at 05:54

## 2023-05-26 NOTE — H&P ADULT - PROBLEM SELECTOR PLAN 1
NSTEMI/CAD s/p PCI 09/2018 at [facility], DERIK mid RCA and s/p DERIK pRCA, DERIK pLAD in 11/2020)  -Ischemic Risk Stratification: ***R/O ACS (UA)  C/o CP similar to prior NSTEMI s/p PCI 09/2018 at [facility], DERIK mid RCA and s/p DERIK pRCA, DERIK pLAD in 11/2020), ECG/CE unremarkable,  -Ischemic Risk Stratification: Kristie Score 90 points, GAYATRI score 5, pt will likely need ICA given high risk  -f/u TTE w/dopplers  -Trend CE q6h  -Repeat ECG  -600mg Plavix Load followed by 75mg qd  -C/w ASA 81mg qd  -C/w atorvastatin 80mg qd  -Interventional cardiology consult, recs appreciated ***R/O ACS (UA)  C/o CP similar to prior NSTEMI s/p PCI 09/2018 at [facility], DERIK mid RCA and s/p DERIK pRCA, DERIK pLAD in 11/2020), ECG/CE unremarkable,  -Ischemic Risk Stratification: Kristie Score 90 points, GAYATRI score 5, will likely need ICA given high risk  -f/u TTE w/dopplers  -Trend CE q6h  -Repeat ECG  -600mg Plavix Load followed by 75mg qd  -C/w ASA 81mg qd  -C/w atorvastatin 80mg qd  -Interventional cardiology consult, recs appreciated ***R/O ACS (UA)  C/o CP and dyspnea similar to prior NSTEMI s/p PCI 09/2018 at [facility], DERIK mid RCA and s/p DERIK pRCA, EDRIK pLAD in 11/2020), ECG/CE unremarkable, her dyspnea is an anginal equivalent and thus UA  -Ischemic Risk Stratification: Kristie Score 90 points, GAYATRI score 5, will likely need ICA given high risk  -f/u TTE w/dopplers  -Trend CE q6h  -Repeat ECG @6am on 5/26/23: sinus madelyn w/no evidence of acute ischemic changes    -600mg Plavix Load followed by 75mg qd  -C/w ASA 81mg qd  -C/w atorvastatin 80mg qd  -BB contraindicated d/t sinus madelyn  -Interventional cardiology consult, recs appreciated

## 2023-05-26 NOTE — H&P ADULT - NSHPSOCIALHISTORY_GEN_ALL_CORE
Marital Status:  (   )    ( x ) Single    (   )    (  )   Lives with: ( x ) alone  (  ) children   (  ) spouse   (  ) parents  (  ) other  Recent Travel: No recent travel  Occupation:  Mobility: no deficits  Functional status:IADLs/ADLs intact  Substance Use (street drugs): ( x ) never used  (  ) other:  Tobacco Usage:  (   ) never smoked   (   ) former smoker   ( x ) current smoker  (     ) pack year  Alcohol Usage: None Marital Status:  (   )    ( x ) Single    (   )    (  )   Lives with: ( x ) alone  (  ) children   (  ) spouse   (  ) parents  (  ) other  Recent Travel: No recent travel  Occupation:  Mobility: no deficits  Functional status:IADLs/ADLs intact  Substance Use (street drugs): ( x ) never used  (  ) other:  Tobacco Usage:  (   ) never smoked   (   ) former smoker   ( x ) current smoker  (   44 ) pack years  Alcohol Usage: None

## 2023-05-26 NOTE — PROGRESS NOTE ADULT - ASSESSMENT
67y obese female, current smoker, w/ PMHx HLD, IDDM T2 (a1c 9.4 11/2020), NSTEMI/CAD s/p PCI 09/2018 at [facility], DERIK mid RCA and s/p DERIK pRCA, DERIK pLAD in 11/2020, IBS, GERD, and hx of nephrolithiasis who presented to [facility] ED c/o left sided chest pain for 2 days PTA. She was found to have an unremarkable ECG and cardiac enzymes with a negative PE on CTPE prelim read. She also had an abnormal liver chemistry panel with elevated AST/ALT and ALP consistent with likely DEL REAL. Given her hx of similar presentation in 11/2020, she is being admitted to cardiac telemetry for further ischemic w/u.

## 2023-05-26 NOTE — H&P ADULT - PROBLEM SELECTOR PLAN 6
hx of NIDDM on 14 units lispro qac and 36 units lantus QHS  -f/u a1c and lipid panel hx of NIDDM on 14 units lispro qac and 36 units lantus QHS  -f/u a1c and lipid panel  -c/w insulin regimen above hx of NIDDM on 14 units lispro qac and 36 units lantus QHS  -f/u a1c and lipid panel  -c/w insulin regimen above  -Endocrine consult, recs appreciated: Will discuss w/endocrine adding SGLT2i and decreasing insulin regimen hx of NIDDM on 14 units lispro qac and 36 units lantus QHS  -f/u a1c and lipid panel  -c/w insulin regimen above  -Endocrine consult, recs appreciated

## 2023-05-26 NOTE — PROGRESS NOTE ADULT - SUBJECTIVE AND OBJECTIVE BOX
Interventional Cardiology PA Adult Progress Note    Subjective Assessment: Pt evaluated at bedside in the ED. Pt appears well and NAD. Denies SOB, palpitations, diaphoresis, LOC, n/v/d, swelling or fever.  	  MEDICATIONS:  amLODIPine   Tablet 5 milliGRAM(s) Oral daily  lisinopril 40 milliGRAM(s) Oral daily  nitroglycerin     SubLingual 0.4 milliGRAM(s) SubLingual every 5 minutes PRN          pantoprazole    Tablet 40 milliGRAM(s) Oral before breakfast  atorvastatin 80 milliGRAM(s) Oral at bedtime  dapagliflozin 10 milliGRAM(s) Oral every 24 hours  dextrose 50% Injectable 25 Gram(s) IV Push once  dextrose 50% Injectable 12.5 Gram(s) IV Push once  dextrose 50% Injectable 25 Gram(s) IV Push once  dextrose Oral Gel 15 Gram(s) Oral once PRN  glucagon  Injectable 1 milliGRAM(s) IntraMuscular once  insulin glargine Injectable (LANTUS) 10 Unit(s) SubCutaneous every morning  insulin glargine Injectable (LANTUS) 5 Unit(s) SubCutaneous at bedtime  insulin lispro (ADMELOG) corrective regimen sliding scale   SubCutaneous Before meals and at bedtime  aspirin enteric coated 81 milliGRAM(s) Oral daily  chlorhexidine 4% Liquid 1 Application(s) Topical once  dextrose 5%. 1000 milliLiter(s) IV Continuous <Continuous>  dextrose 5%. 1000 milliLiter(s) IV Continuous <Continuous>  magnesium sulfate  IVPB 1 Gram(s) IV Intermittent once  sodium chloride 0.9% Bolus 250 milliLiter(s) IV Bolus once  sodium chloride 0.9%. 500 milliLiter(s) IV Continuous <Continuous>  	    [PHYSICAL EXAM:  TELEMETRY:  T(C): 36.4 (05-26-23 @ 10:48), Max: 36.9 (05-25-23 @ 20:05)  HR: 50 (05-26-23 @ 13:00) (42 - 63)  BP: 164/72 (05-26-23 @ 13:00) (106/55 - 166/81)  RR: 18 (05-26-23 @ 13:00) (17 - 24)  SpO2: 96% (05-26-23 @ 13:00) (96% - 100%)  Wt(kg): --  I&O's Summary    Height (cm): 170.2 (05-25 @ 20:12)  Weight (kg): 108.9 (05-25 @ 20:12)  BMI (kg/m2): 37.6 (05-25 @ 20:12)  BSA (m2): 2.19 (05-25 @ 20:12)  Bower:  Central/PICC/Mid Line:                                         Appearance: Normal	  HEENT:   Normal oral mucosa, PERRL, EOMI	  Neck: Supple, + JVD/ - JVD; Carotid Bruit   Cardiovascular: Normal S1 S2, No JVD, No murmurs,   Respiratory: Lungs clear to auscultation/Decreased Breath Sounds/No Rales, Rhonchi, Wheezing	  Gastrointestinal:  Soft, Non-tender, + BS	  Skin: No rashes, No ecchymoses, No cyanosis  Extremities: Normal range of motion, No clubbing, cyanosis or edema  Vascular: Peripheral pulses palpable 2+ bilaterally  Neurologic: Non-focal  Psychiatry: A & O x 3, Mood & affect appropriate                            13.9   6.22  )-----------( 221      ( 25 May 2023 21:20 )             42.4     05-26    141  |  110<H>  |  24<H>  ----------------------------<  90  4.3   |  24  |  1.28    Ca    8.9      26 May 2023 09:59  Phos  3.4     05-26  Mg     1.9     05-26    TPro  7.2  /  Alb  3.6  /  TBili  0.4  /  DBili  x   /  AST  49<H>  /  ALT  80<H>  /  AlkPhos  132<H>  05-26      PT/INR - ( 26 May 2023 09:59 )   PT: 15.7 sec;   INR: 1.32          PTT - ( 26 May 2023 09:59 )  PTT:32.1 sec     Interventional Cardiology PA Adult Progress Note    Subjective Assessment: Pt evaluated at bedside in the ED. Pt appears well and NAD. Denies SOB, palpitations, diaphoresis, LOC, n/v/d, swelling or fever.  	  MEDICATIONS:  amLODIPine   Tablet 5 milliGRAM(s) Oral daily  lisinopril 40 milliGRAM(s) Oral daily  nitroglycerin     SubLingual 0.4 milliGRAM(s) SubLingual every 5 minutes ND  pantoprazole    Tablet 40 milliGRAM(s) Oral before breakfast  atorvastatin 80 milliGRAM(s) Oral at bedtime  dapagliflozin 10 milliGRAM(s) Oral every 24 hours  dextrose 50% Injectable 25 Gram(s) IV Push once  dextrose 50% Injectable 12.5 Gram(s) IV Push once  dextrose 50% Injectable 25 Gram(s) IV Push once  dextrose Oral Gel 15 Gram(s) Oral once PRN  glucagon  Injectable 1 milliGRAM(s) IntraMuscular once  insulin glargine Injectable (LANTUS) 10 Unit(s) SubCutaneous every morning  insulin glargine Injectable (LANTUS) 5 Unit(s) SubCutaneous at bedtime  insulin lispro (ADMELOG) corrective regimen sliding scale   SubCutaneous Before meals and at bedtime  aspirin enteric coated 81 milliGRAM(s) Oral daily  chlorhexidine 4% Liquid 1 Application(s) Topical once  dextrose 5%. 1000 milliLiter(s) IV Continuous <Continuous>  dextrose 5%. 1000 milliLiter(s) IV Continuous <Continuous>  magnesium sulfate  IVPB 1 Gram(s) IV Intermittent once  sodium chloride 0.9% Bolus 250 milliLiter(s) IV Bolus once  sodium chloride 0.9%. 500 milliLiter(s) IV Continuous <Continuous>  	    [PHYSICAL EXAM:  TELEMETRY:  T(C): 36.4 (05-26-23 @ 10:48), Max: 36.9 (05-25-23 @ 20:05)  HR: 50 (05-26-23 @ 13:00) (42 - 63)  BP: 164/72 (05-26-23 @ 13:00) (106/55 - 166/81)  RR: 18 (05-26-23 @ 13:00) (17 - 24)  SpO2: 96% (05-26-23 @ 13:00) (96% - 100%)  Wt(kg): --  I&O's Summary    Height (cm): 170.2 (05-25 @ 20:12)  Weight (kg): 108.9 (05-25 @ 20:12)  BMI (kg/m2): 37.6 (05-25 @ 20:12)  BSA (m2): 2.19 (05-25 @ 20:12)  Bower:  Central/PICC/Mid Line:                                         Appearance: Normal	  HEENT:   Normal oral mucosa, PERRL, EOMI	  Neck: Supple, + JVD/ - JVD; Carotid Bruit   Cardiovascular: Normal S1 S2, No JVD, No murmurs,   Respiratory: Lungs clear to auscultation/Decreased Breath Sounds/No Rales, Rhonchi, Wheezing	  Gastrointestinal:  Soft, Non-tender, + BS	  Skin: No rashes, No ecchymoses, No cyanosis  Extremities: Normal range of motion, No clubbing, cyanosis or edema  Vascular: Peripheral pulses palpable 2+ bilaterally  Neurologic: Non-focal  Psychiatry: A & O x 3, Mood & affect appropriate                            13.9   6.22  )-----------( 221      ( 25 May 2023 21:20 )             42.4     05-26    141  |  110<H>  |  24<H>  ----------------------------<  90  4.3   |  24  |  1.28    Ca    8.9      26 May 2023 09:59  Phos  3.4     05-26  Mg     1.9     05-26    TPro  7.2  /  Alb  3.6  /  TBili  0.4  /  DBili  x   /  AST  49<H>  /  ALT  80<H>  /  AlkPhos  132<H>  05-26      PT/INR - ( 26 May 2023 09:59 )   PT: 15.7 sec;   INR: 1.32          PTT - ( 26 May 2023 09:59 )  PTT:32.1 sec     Interventional Cardiology PA Adult Progress Note    Subjective Assessment: Pt evaluated at bedside in the ED. Pt appears well and NAD. Denies SOB, palpitations, diaphoresis, LOC, n/v/d, swelling or fever.  	  MEDICATIONS:  amLODIPine   Tablet 5 milliGRAM(s) Oral daily  lisinopril 40 milliGRAM(s) Oral daily  nitroglycerin     SubLingual 0.4 milliGRAM(s) SubLingual every 5 minutes CT  pantoprazole    Tablet 40 milliGRAM(s) Oral before breakfast  atorvastatin 80 milliGRAM(s) Oral at bedtime  dapagliflozin 10 milliGRAM(s) Oral every 24 hours  dextrose 50% Injectable 25 Gram(s) IV Push once  dextrose 50% Injectable 12.5 Gram(s) IV Push once  dextrose 50% Injectable 25 Gram(s) IV Push once  dextrose Oral Gel 15 Gram(s) Oral once PRN  glucagon  Injectable 1 milliGRAM(s) IntraMuscular once  insulin glargine Injectable (LANTUS) 10 Unit(s) SubCutaneous every morning  insulin glargine Injectable (LANTUS) 5 Unit(s) SubCutaneous at bedtime  insulin lispro (ADMELOG) corrective regimen sliding scale   SubCutaneous Before meals and at bedtime  aspirin enteric coated 81 milliGRAM(s) Oral daily  chlorhexidine 4% Liquid 1 Application(s) Topical once  dextrose 5%. 1000 milliLiter(s) IV Continuous <Continuous>  dextrose 5%. 1000 milliLiter(s) IV Continuous <Continuous>  magnesium sulfate  IVPB 1 Gram(s) IV Intermittent once  sodium chloride 0.9% Bolus 250 milliLiter(s) IV Bolus once  sodium chloride 0.9%. 500 milliLiter(s) IV Continuous <Continuous>  	    [PHYSICAL EXAM:  TELEMETRY:  T(C): 36.4 (05-26-23 @ 10:48), Max: 36.9 (05-25-23 @ 20:05)  HR: 50 (05-26-23 @ 13:00) (42 - 63)  BP: 164/72 (05-26-23 @ 13:00) (106/55 - 166/81)  RR: 18 (05-26-23 @ 13:00) (17 - 24)  SpO2: 96% (05-26-23 @ 13:00) (96% - 100%)  Wt(kg): --  I&O's Summary    Height (cm): 170.2 (05-25 @ 20:12)  Weight (kg): 108.9 (05-25 @ 20:12)  BMI (kg/m2): 37.6 (05-25 @ 20:12)  BSA (m2): 2.19 (05-25 @ 20:12)  Bower:  Central/PICC/Mid Line:                                         Appearance: Normal	  HEENT:   Normal oral mucosa, PERRL, EOMI	  Neck: Supple, - JVD; no Carotid Bruit   Cardiovascular: Normal S1 S2, No JVD, No murmurs,   Respiratory: Lungs clear to auscultation, No Rales, Rhonchi, Wheezing	  Gastrointestinal:  Soft, Non-tender, + BS	  Skin: No rashes, No ecchymoses, No cyanosis  Extremities: Normal range of motion, No clubbing, cyanosis or edema  Vascular: Peripheral pulses palpable 2+ bilaterally  Neurologic: Non-focal  Psychiatry: A & O x 3, Mood & affect appropriate                            13.9   6.22  )-----------( 221      ( 25 May 2023 21:20 )             42.4     05-26    141  |  110<H>  |  24<H>  ----------------------------<  90  4.3   |  24  |  1.28    Ca    8.9      26 May 2023 09:59  Phos  3.4     05-26  Mg     1.9     05-26    TPro  7.2  /  Alb  3.6  /  TBili  0.4  /  DBili  x   /  AST  49<H>  /  ALT  80<H>  /  AlkPhos  132<H>  05-26      PT/INR - ( 26 May 2023 09:59 )   PT: 15.7 sec;   INR: 1.32          PTT - ( 26 May 2023 09:59 )  PTT:32.1 sec

## 2023-05-26 NOTE — ED PROVIDER NOTE - CLINICAL SUMMARY MEDICAL DECISION MAKING FREE TEXT BOX
Concerned for ACS, given concerning history and physical and increased risk factors. ECG shows NSR w no CORBY or TWI. Possible PE with SOB and run of vent arrhythmia, will CTA chest to r/o clot  Low susp of PTX, aortic dissection, cardiac effusion/tamponade. Will most likely admit for inpatient risk stratification and possible stress testing with Cardiology.  Trop neg, CXR neg.  Plan: Cardiac monitor, EKG, trop, CXR, ASA, nitro, pain control, reassess, Cardiology consult, admit

## 2023-05-26 NOTE — PROGRESS NOTE ADULT - PROBLEM SELECTOR PLAN 4
hx of NIDDM on 14 units lispro qac and 36 units lantus QHS  -f/u a1c and lipid panel  -c/w insulin regimen above  -Endocrine consult, recs appreciated.

## 2023-05-26 NOTE — PROGRESS NOTE ADULT - PROBLEM SELECTOR PROBLEM 7
A (Cath Guide Vbt Jl4 7f) guide catheter was introduced.    Nutrition, metabolism, and development symptoms

## 2023-05-26 NOTE — H&P ADULT - PROBLEM SELECTOR PLAN 3
Elevated AST/ALT/ALP consistent w/cholestatic pattern, w/normal levels during 11/2020 admission, PE unremarkable including Vega's sign, etiologies included DEL REAL (likely given cardiometabolic risk factors) vs. DILI vs. Viral Hepatitis vs. Alcoholic hepatitis   - f/u hepatitis panel  -Trend liver chemistry panel   - Will consider RUQ US if enzymes uptrend Elevated AST/ALT/ALP consistent w/cholestatic pattern w/normal levels during 11/2020 admission, abnormal coagulation cascade, PE unremarkable including Vega's sign, etiologies included DEL REAL (likely given cardiometabolic risk factors) vs. DILI vs. Viral Hepatitis vs. Alcoholic hepatitis vs. cirrhosis   - f/u hepatitis panel  -Trend liver chemistry panel   - Will consider RUQ US if enzymes uptrend

## 2023-05-26 NOTE — PHYSICAL THERAPY INITIAL EVALUATION ADULT - GAIT DEVIATIONS NOTED, PT EVAL
decreased christina/increased time in double stance/decreased step length/decreased weight-shifting ability

## 2023-05-26 NOTE — ED PROVIDER NOTE - NS ED ROS FT
CONSTITUTIONAL: No fever, no chills, no fatigue  EYES: No eye redness, no visual changes  ENT: No ear pain, no sore throat  CARDIOVASCULAR:  + chest pain, no palpitations  RESPIRATORY: No cough,  + SOB  GI: No abdominal pain, no nausea, no vomiting, no constipation, no diarrhea  GENITOURINARY: No dysuria, no frequency, no hematuria  MUSCULOSKELETAL: No back pain, no joint pain, no myalgias  SKIN: No rash, no peripheral edema  NEURO: No headache, no confusion    ALL OTHER SYSTEMS NEGATIVE.

## 2023-05-26 NOTE — PROGRESS NOTE ADULT - PROBLEM SELECTOR PLAN 1
C/o intermittent CP similar to prior NSTEMI s/p PCI 09/2018 at [facility], DERIK mid RCA and s/p DERIK pRCA, DERIK pLAD in 11/2020, ECG/CE unremarkable  -Ischemic Risk Stratification: Kristie Score 90 points, GAYATRI score 5, will likely need ICA given high risk  -f/u TTE w/dopplers  -Trend CE q6h  -Repeat ECG @6am on 5/26/23: sinus madelyn w/no evidence of acute ischemic changes    -600mg Plavix Load followed by 75mg qd  -C/w ASA 81mg qd  -C/w atorvastatin 80mg qd  -BB contraindicated d/t sinus madelyn  -Interventional cardiology consult, recs appreciated. C/o intermittent CP similar to prior NSTEMI s/p PCI 09/2018 at [facility], DERIK mid RCA and s/p DERIK pRCA, DERIK pLAD in 11/2020, ECG/CE unremarkable  -Ischemic Risk Stratification: Kristie Score 90 points, GAYATRI score 5, will likely need ICA given high risk  -TTE w/dopplers- Mild symmetric LVH, Normal LV systolic function, normal findings  -Trops neg x 3  -Repeat ECG @6am on 5/26/23: sinus madelyn w/no evidence of acute ischemic changes    -600mg Plavix Load followed by 75mg qd  -C/w ASA 81mg qd  -C/w atorvastatin 80mg qd  -Going for [facility] today C/o intermittent CP similar to prior NSTEMI s/p PCI 09/2018 at [facility], DERIK mid RCA and s/p DERIK pRCA, DERIK pLAD in 11/2020  -TTE w/dopplers- Mild symmetric LVH, Normal LV systolic function, normal findings  -Trops neg x 3  -CTA: No PE, No acute pathology  -Repeat ECG @6am on 5/26/23: sinus madelyn w/no evidence of acute ischemic changes    -600mg Plavix Load followed by 75mg qd  -C/w ASA 81mg qd  -C/w atorvastatin 80mg qd  -Going for [facility] today C/o intermittent CP similar to prior NSTEMI s/p PCI 09/2018 at [facility], DERIK mid RCA and s/p DERIK pRCA, DERIK pLAD in 11/2020  -s/p cath 5/26/23: RCA large dominant patent stent with 30% ISR; RPDA/RPLS small mild diffuse; LM large minor; LAD large patent with 30% ISR; D1 medium size minor; Ramus large mild dz; LCx large prox 30% tubular; OM1 medium size mid 30%; EDP 17. Right wrist access  -TTE w/dopplers- Mild symmetric LVH, Normal LV systolic function, normal findings  -Trops neg x 3  -CTA: No PE, No acute pathology  -Repeat ECG @6am on 5/26/23: sinus madelyn w/no evidence of acute ischemic changes    -600mg Plavix Load followed by 75mg qd  -C/w ASA 81mg qd  -C/w atorvastatin 80mg qd C/o intermittent CP similar to prior NSTEMI s/p PCI 09/2018 at [facility], DERIK mid RCA and s/p DERIK pRCA, DERIK pLAD in 11/2020  -s/p cath 5/26/23: RCA large dominant patent stent with 30% ISR; RPDA/RPLS small mild diffuse; LM large minor; LAD large patent with 30% ISR; D1 medium size minor; Ramus large mild dz; LCx large prox 30% tubular; OM1 medium size mid 30%; EDP 17. Right wrist access  -TTE w/dopplers 5/26/23- Mild symmetric LVH, Normal LV systolic function, normal findings  -Trops neg x 3  -CTA 5/26/23: No PE, No acute pathology  -Repeat ECG @6am on 5/26/23: sinus madelyn w/no evidence of acute ischemic changes    -600mg Plavix Load followed by 75mg qd  -C/w ASA 81mg qd  -C/w atorvastatin 80mg qd

## 2023-05-26 NOTE — PATIENT PROFILE ADULT - FALL HARM RISK - UNIVERSAL INTERVENTIONS
Bed in lowest position, wheels locked, appropriate side rails in place/Call bell, personal items and telephone in reach/Instruct patient to call for assistance before getting out of bed or chair/Non-slip footwear when patient is out of bed/[place] to call system/Physically safe environment - no spills, clutter or unnecessary equipment/Purposeful Proactive Rounding/Room/bathroom lighting operational, light cord in reach

## 2023-05-26 NOTE — H&P ADULT - ASSESSMENT
67y obese female, current smoker, w/ PMHx HLD, NIDDM (a1c 9.4 11/2020), NSTEMI/CAD s/p PCI 09/2018 at [facility], DERIK mid RCA and s/p DERIK pRCA, DERIK pLAD in 11/2020), IBS, GERD, and hx of nephrolithiasis who presented to [facility] ED c/o left sided chest pain for 2 days PTA. She was found to have an unremarkable ECG and cardiac enzymes with a negative PE on CTPE prelim read. She also had an abnormal liver chemistry panel with elevated AST/ALT and ALP consistent with likely DEL REAL. Given her hx of similar presentation in 11/2020, she is being admitted to cardiac telemetry for further ischemic w/u. 67y obese female, current smoker, w/ PMHx HLD, IDDM T2 (a1c 9.4 11/2020), NSTEMI/CAD s/p PCI 09/2018 at [facility], DERIK mid RCA and s/p DERIK pRCA, DERIK pLAD in 11/2020), IBS, GERD, and hx of nephrolithiasis who presented to [facility] ED c/o left sided chest pain for 2 days PTA. She was found to have an unremarkable ECG and cardiac enzymes with a negative PE on CTPE prelim read. She also had an abnormal liver chemistry panel with elevated AST/ALT and ALP consistent with likely DEL REAL. Given her hx of similar presentation in 11/2020, she is being admitted to cardiac telemetry for further ischemic w/u. 67y obese female, current smoker, w/ PMHx HLD, IDDM T2 (a1c 9.4 11/2020), NSTEMI/CAD s/p PCI 09/2018 at [facility], DERIK mid RCA and s/p DERIK pRCA, DERIK pLAD in 11/2020), IBS, GERD, and hx of nephrolithiasis who presented to [facility] ED c/o left sided chest pain for 2 days PTA. She was found to have an unremarkable ECG and cardiac enzymes with a negative PE on CTPE prelim read. She also had an abnormal liver chemistry panel with elevated AST/ALT and ALP consistent with likely DEL REAL. Given her hx of similar presentation in 11/2020, she is being admitted to cardiac telemetry for further ischemic w/u.      **NB: Pending examination and attestation by an attending cardiologist

## 2023-05-26 NOTE — PROGRESS NOTE ADULT - PROBLEM SELECTOR PLAN 6
hx of HLD on rosuvastatin 20mg qd and Zetia 10mg qd  -c/w interchange atorvastatin 80mg qd.      F: None  E: Replete if K<4 or Mag<2  N: NPO until after cath  GIppx: Protonix  VTEppx: ASA and plavix  Dispo: hx of HLD on rosuvastatin 20mg qd and Zetia 10mg qd  -c/w interchange atorvastatin 80mg qd.      F: None  E: Replete if K<4 or Mag<2  N: Dash diet  GIppx: Protonix  Dispo: probable dispo tomorrow due to cath results and CP resolving

## 2023-05-26 NOTE — H&P ADULT - PROBLEM SELECTOR PLAN 4
Hx of CKD 4  - Will discuss w/endocrine adding SGLT2i and decreasing insulin regimen  - trend Cr  - avoid nephrotoxic drugs, renally dose meds Hx of CKD 4  - C/w farxiga 10mg  - trend Cr  - avoid nephrotoxic drugs, renally dose meds

## 2023-05-26 NOTE — H&P ADULT - NSHPPHYSICALEXAM_GEN_ALL_CORE
.  VITAL SIGNS:  T(F): 97.6 (05-26-23 @ 02:04), Max: 98.5 (05-25-23 @ 20:05)  HR: 47 (05-26-23 @ 02:04) (47 - 63)  BP: 132/61 (05-26-23 @ 02:04) (106/55 - 166/81)  BP(mean): --  RR: 18 (05-26-23 @ 02:04) (18 - 24)  SpO2: 98% (05-26-23 @ 02:04) (97% - 98%)    PHYSICAL EXAM:    Constitutional: resting comfortably in bed; NAD  HEENT: NC/AT, PERRL, EOMI, anicteric sclera, no nasal discharge; uvula midline, no oropharyngeal erythema or exudates; MMM  Neck: supple; no JVD or thyromegaly  Respiratory: unlabored breathing, CTA B/L; no W/Rhonchi/Crackles, no retractions or use of accessory muscles   Cardiac: +S1/S2; RRR; no M/R/G; No ventricular heaves, PMI non-displaced  Gastrointestinal: soft, NT/ND; no rebound or guarding; +BSx4  Extremities: WWP, no clubbing or cyanosis; no peripheral edema  Musculoskeletal: NROM x4; no joint swelling, tenderness or erythema  Vascular: 2+ radial, DP/PT pulses B/L  Dermatologic: skin warm, dry and intact; no rashes, wounds, or scars  Lymphatic: no submandibular or cervical LAD  Neurologic: AAOx3; CNII-XII grossly intact; no focal deficits

## 2023-05-26 NOTE — H&P ADULT - PROBLEM SELECTOR PLAN 9
Fluids:   Electrolytes: Mg>2, K>4  Nutrition:  No IVF currently needed, replete lytes PRN  Prophylaxis: heparin sub q  Activity: AAT, OOBTC  GI: PPI  C: FC  Dispo: Admit to cardiac tele Fluids: NI  Electrolytes: Mg>2, K>4  Nutrition:  No IVF currently needed, replete lytes PRN  Prophylaxis: heparin sub q  Activity: AAT, OOBTC  GI: PPI  C: FC  Dispo: Admit to cardiac tele

## 2023-05-26 NOTE — ED PROVIDER NOTE - PROGRESS NOTE DETAILS
patient had run of V. tach captured on monitor, states she felt dizzy during the episode. Episode spontaneously resolved, NSR on repeat EKG, no clear signs of ischemia. Patient is intermittently bradycardic to high 40s. Pads placed, BP stable. Cardiology consulted. no PE on CT, will admit to cardiology

## 2023-05-26 NOTE — H&P ADULT - PROBLEM SELECTOR PLAN 5
Hx of HTN on carvedilol and amlodipine   -c/w carvedilol 6.25mg BID  -c/w amlodipine 2.5mg qd Hx of HTN on carvedilol and amlodipine   -c/w carvedilol 6.25mg BID  -c/w amlodipine 2.5mg qd    #Smoker- current smoker w/ ? pack years, currently in precontemplative stage  -begin nicotine 14mg qd patches

## 2023-05-26 NOTE — H&P ADULT - NS ATTEND AMEND GEN_ALL_CORE FT
67y obese female, current smoker (not willing to quit, does not want NRT), w/ HLD, IDDM T2 (a1c 9.4 11/2020), NSTEMI/CAD s/p PCI 09/2018 at [facility], DERIK mid RCA and s/p DERIK pRCA, DERIK pLAD in 11/2020), IBS, GERD, and hx of nephrolithiasis who presented to [facility] ED c/o left sided chest pain for 2 days PTA. EKG NSR no ischemia, Trop neg, cTA PE neg. IC team reviewed patients prior films and decided to take for [facility] today  Plan for [facility] with dr Gore  Patient declines NRT, not ready for smoking cessation  Referral to endocrinologist for DM mgmt  Anticipate discharge ready SAT 5/27 pending cath results  Chan Walters M.D.  Cardiology Attending  55minutes spent on total encounter; more than 50% of the visit was spent counseling and/or coordinating care by the attending physician, with plan of care discussed with the patient, Dr Gore and cardiac team.

## 2023-05-26 NOTE — ED PROVIDER NOTE - OBJECTIVE STATEMENT
66 yo F w PMH of CAD w stents, HTN, HLD, uncontrolled type 2 DM, NATHAN (on CPAP), p/w L-sided chest pain for 2 days. Pt states pain is constant, worse with ambulation, accompanied by SOB. She experienced similar pain during prior cardiac event resulting in stent placement. She states it feels like pressure, nonradiating, mildly pleuritic. She has no leg swelling, cough, hemoptysis. Pain increased in severity upon arrival to ED.

## 2023-05-26 NOTE — H&P ADULT - NSHPLABSRESULTS_GEN_ALL_CORE
.  LABS:                         13.9   6.22  )-----------( 221      ( 25 May 2023 21:20 )             42.4     05-25    142  |  110<H>  |  24<H>  ----------------------------<  99  4.3   |  23  |  1.49<H>    Ca    9.1      25 May 2023 22:54  Mg     2.2     05-25    TPro  7.7  /  Alb  4.0  /  TBili  0.4  /  DBili  x   /  AST  84<H>  /  ALT  109<H>  /  AlkPhos  146<H>  05-25    PT/INR - ( 25 May 2023 21:20 )   PT: 16.9 sec;   INR: 1.42          PTT - ( 25 May 2023 21:20 )  PTT:31.3 sec    CARDIAC MARKERS ( 25 May 2023 22:54 )  x     / <0.01 ng/mL / 131 U/L / x     / 2.3 ng/mL  CARDIAC MARKERS ( 25 May 2023 21:20 )  x     / <0.01 ng/mL / x     / x     / x                RADIOLOGY, EKG & ADDITIONAL TESTS: Reviewed.

## 2023-05-26 NOTE — H&P ADULT - PROBLEM SELECTOR PROBLEM 3
Dr. Rosales , since pharmacy's do not carry this wrist brace , maybe we should send this over to [facility] ..   Abnormal liver enzymes

## 2023-05-26 NOTE — H&P ADULT - PROBLEM SELECTOR PLAN 2
Will discuss w/endocrine adding SGLT2i and decreasing insulin regimen  -remainder of care above -c/w farxiga 10mg qd and ramipril 10mg qd  -remainder of care above

## 2023-05-26 NOTE — H&P ADULT - HISTORY OF PRESENT ILLNESS
**Incomplete Note**    67y obese female, current smoker, w/ PMHx HLD, NIDDM (a1c 9.4 11/2020), NSTEMI/CAD s/p PCI 09/2018 at [facility], DERIK mid RCA and s/p DERIK pRCA, DERIK pLAD in 11/2020), IBS, GERD, and hx of nephrolithiasis who presented to [facility] ED c/o left sided chest pain for 2 days PTA. On ROS, Pt denies: fevers, chills, myalgias, dizziness, weakness, HA, dysphagia, dysarthria, changes in vision, palpitations, cough, PND, orthopnea, N/V/D/C, abdominal pain, dysuria, urinary urgency/increased frequency, changes in bowel movements, melena, hematochezia, LE edema, joint pain, or unintentional weightloss. ROS otherwise negative.    ED Course: Given 0.4mg sublingual nitro, 4mg  morphine IVPx2, aspirin 162mg  Vitals: AVSS  Labs: S/F trop 0.01 x2, PT 16.9, INR 1.42, ptt 31.3, , AST 84,   ECG: NSR w/TW flattening in v5-v6, repeat ECG sinus madelyn cardia w/no ischemic changes  CT PE: prelim read negative for acute PE    Interval Event: In the ED, pt noted to have had run of V. tach captured on monitor, states she felt dizzy during the episode. Episode spontaneously resolved.    Pt seen and examined at bedside, NAD, CP resolved. ROS per above      - - - **Incomplete Note**    67y obese female, current smoker, w/ PMHx HLD, IDDM T2 (a1c 9.4 11/2020), NSTEMI/CAD s/p PCI 09/2018 at [facility], DERIK mid RCA and s/p DERIK pRCA, DERIK pLAD in 11/2020), IBS, GERD, and hx of nephrolithiasis who presented to [facility] ED c/o left sided chest pain for 2 days PTA. On ROS, Pt denies: fevers, chills, myalgias, dizziness, weakness, HA, dysphagia, dysarthria, changes in vision, palpitations, cough, PND, orthopnea, N/V/D/C, abdominal pain, dysuria, urinary urgency/increased frequency, changes in bowel movements, melena, hematochezia, LE edema, joint pain, or unintentional weightloss. ROS otherwise negative.    ED Course: Given 0.4mg sublingual nitro, 4mg  morphine IVPx2, aspirin 162mg  Vitals: AVSS  Labs: S/F trop 0.01 x2, PT 16.9, INR 1.42, ptt 31.3, , AST 84,   ECG: NSR w/TW flattening in v5-v6, repeat ECG sinus madelyn cardia w/no ischemic changes  CT PE: prelim read negative for acute PE    Interval Event: In the ED, pt noted to have had run of V. tach captured on monitor, states she felt dizzy during the episode. Episode spontaneously resolved.    Pt seen and examined at bedside, NAD, CP resolved. ROS per above      67y obese female, current smoker, w/ PMHx HLD, IDDM T2 (a1c 9.4 11/2020), NSTEMI/CAD s/p PCI 09/2018 at [facility], DERIK mid RCA and s/p DERIK pRCA, DERIK pLAD in 11/2020), IBS, GERD, and hx of nephrolithiasis who presented to [facility] ED c/o left sided chest pressure/pain for 3 days PTA. The nonradiating chest pressure and pain (7/10) is located under her left breast and occurs at rest laying flat and when performing ADLs. Some of these episodes were accompanied by shortness of breath-she does not know how long the episodes last for but can confirm that they resolve spontaneously. On ROS, Pt denies: fevers, chills, myalgias, dizziness, weakness, HA, dysphagia, dysarthria, changes in vision, palpitations, cough, PND, orthopnea, N/V/D/C, abdominal pain, dysuria, urinary urgency/increased frequency, changes in bowel movements, melena, hematochezia, LE edema, joint pain, or unintentional weightloss. ROS otherwise negative.    ED Course: Given 0.4mg sublingual nitro, 4mg  morphine IVPx2, aspirin 162mg  Vitals: AVSS  Labs: S/F trop 0.01 x2, PT 16.9, INR 1.42, ptt 31.3, , AST 84,   ECG: NSR w/TW flattening in v5-v6, repeat ECG sinus madelyn cardia w/no ischemic changes  CT PE: prelim read negative for acute PE    Interval Event: In the ED, pt noted to have had run of V. tach captured on monitor, states she felt dizzy during the episode. Episode spontaneously resolved.    Pt seen and examined at bedside, NAD, left sided chest pressure/pain still present despite SL nitro and morphine. ROS per above

## 2023-05-26 NOTE — ED PROVIDER NOTE - PHYSICAL EXAMINATION
CONSTITUTIONAL: Non-toxic; in pain, obese  HEAD: Normocephalic; atraumatic  EYES: PERRL; EOM intact   ENMT: External appears normal  NECK: Supple; non-tender  CARD: Normal S1, S2; no murmurs, rubs, or gallops  RESP: Normal chest excursion with respiration; breath sounds clear and equal bilaterally  ABD: Soft, non-distended; non-tender  EXT: Normal ROM in all four extremities; non-tender to palpation  SKIN: Warm, dry, no rash  NEURO:  No focal neurological deficiencies.

## 2023-05-26 NOTE — PROGRESS NOTE ADULT - PROBLEM SELECTOR PLAN 2
Elevated AST/ALT/ALP consistent w/cholestatic pattern w/normal levels during 11/2020 admission, abnormal coagulation cascade, PE unremarkable including Vega's sign, etiologies included DEL REAL (likely given cardiometabolic risk factors) vs. DILI vs. Viral Hepatitis vs. Alcoholic hepatitis vs. cirrhosis   - f/u hepatitis panel  -Trend liver chemistry panel   - Will consider RUQ US if enzymes uptrend.

## 2023-05-26 NOTE — PHYSICAL THERAPY INITIAL EVALUATION ADULT - PERTINENT HX OF CURRENT PROBLEM, REHAB EVAL
67y obese female, current smoker, w/ PMHx HLD, IDDM T2 (a1c 9.4 11/2020), NSTEMI/CAD s/p PCI 09/2018 at [facility], DERIK mid RCA and s/p DERIK pRCA, DERIK pLAD in 11/2020), IBS, GERD, and hx of nephrolithiasis who presented to [facility] ED c/o left sided chest pain for 2 days PTA. She was found to have an unremarkable ECG and cardiac enzymes with a negative PE on CTPE prelim read. She also had an abnormal liver chemistry panel with elevated AST/ALT and ALP consistent with likely DEL REAL. Given her hx of similar presentation in 11/2020, she is being admitted to cardiac telemetry for further ischemic w/u.

## 2023-05-27 ENCOUNTER — TRANSCRIPTION ENCOUNTER (OUTPATIENT)
Age: 68
End: 2023-05-27

## 2023-05-27 VITALS — TEMPERATURE: 97 F

## 2023-05-27 LAB
ALBUMIN SERPL ELPH-MCNC: 3.6 G/DL — SIGNIFICANT CHANGE UP (ref 3.3–5)
ALP SERPL-CCNC: 130 U/L — HIGH (ref 40–120)
ALT FLD-CCNC: 62 U/L — HIGH (ref 10–45)
ANION GAP SERPL CALC-SCNC: 9 MMOL/L — SIGNIFICANT CHANGE UP (ref 5–17)
APTT BLD: 32.5 SEC — SIGNIFICANT CHANGE UP (ref 27.5–35.5)
AST SERPL-CCNC: 34 U/L — SIGNIFICANT CHANGE UP (ref 10–40)
BILIRUB SERPL-MCNC: 0.4 MG/DL — SIGNIFICANT CHANGE UP (ref 0.2–1.2)
BUN SERPL-MCNC: 20 MG/DL — SIGNIFICANT CHANGE UP (ref 7–23)
CALCIUM SERPL-MCNC: 8.4 MG/DL — SIGNIFICANT CHANGE UP (ref 8.4–10.5)
CHLORIDE SERPL-SCNC: 109 MMOL/L — HIGH (ref 96–108)
CO2 SERPL-SCNC: 22 MMOL/L — SIGNIFICANT CHANGE UP (ref 22–31)
CREAT SERPL-MCNC: 1.13 MG/DL — SIGNIFICANT CHANGE UP (ref 0.5–1.3)
EGFR: 54 ML/MIN/1.73M2 — LOW
GLUCOSE BLDC GLUCOMTR-MCNC: 73 MG/DL — SIGNIFICANT CHANGE UP (ref 70–99)
GLUCOSE SERPL-MCNC: 75 MG/DL — SIGNIFICANT CHANGE UP (ref 70–99)
HAV IGM SER-ACNC: SIGNIFICANT CHANGE UP
HBV CORE IGM SER-ACNC: SIGNIFICANT CHANGE UP
HBV SURFACE AG SER-ACNC: SIGNIFICANT CHANGE UP
HCT VFR BLD CALC: 41 % — SIGNIFICANT CHANGE UP (ref 34.5–45)
HCV AB S/CO SERPL IA: 0.04 S/CO — SIGNIFICANT CHANGE UP
HCV AB SERPL-IMP: SIGNIFICANT CHANGE UP
HGB BLD-MCNC: 13.4 G/DL — SIGNIFICANT CHANGE UP (ref 11.5–15.5)
INR BLD: 1.24 — HIGH (ref 0.88–1.16)
MAGNESIUM SERPL-MCNC: 2.1 MG/DL — SIGNIFICANT CHANGE UP (ref 1.6–2.6)
MCHC RBC-ENTMCNC: 29.8 PG — SIGNIFICANT CHANGE UP (ref 27–34)
MCHC RBC-ENTMCNC: 32.7 GM/DL — SIGNIFICANT CHANGE UP (ref 32–36)
MCV RBC AUTO: 91.1 FL — SIGNIFICANT CHANGE UP (ref 80–100)
NRBC # BLD: 0 /100 WBCS — SIGNIFICANT CHANGE UP (ref 0–0)
PHOSPHATE SERPL-MCNC: 3.2 MG/DL — SIGNIFICANT CHANGE UP (ref 2.5–4.5)
PLATELET # BLD AUTO: 204 K/UL — SIGNIFICANT CHANGE UP (ref 150–400)
POTASSIUM SERPL-MCNC: 3.9 MMOL/L — SIGNIFICANT CHANGE UP (ref 3.5–5.3)
POTASSIUM SERPL-SCNC: 3.9 MMOL/L — SIGNIFICANT CHANGE UP (ref 3.5–5.3)
PROT SERPL-MCNC: 6.6 G/DL — SIGNIFICANT CHANGE UP (ref 6–8.3)
PROTHROM AB SERPL-ACNC: 14.8 SEC — HIGH (ref 10.5–13.4)
RBC # BLD: 4.5 M/UL — SIGNIFICANT CHANGE UP (ref 3.8–5.2)
RBC # FLD: 13.1 % — SIGNIFICANT CHANGE UP (ref 10.3–14.5)
SODIUM SERPL-SCNC: 140 MMOL/L — SIGNIFICANT CHANGE UP (ref 135–145)
WBC # BLD: 5.52 K/UL — SIGNIFICANT CHANGE UP (ref 3.8–10.5)
WBC # FLD AUTO: 5.52 K/UL — SIGNIFICANT CHANGE UP (ref 3.8–10.5)

## 2023-05-27 PROCEDURE — 83735 ASSAY OF MAGNESIUM: CPT

## 2023-05-27 PROCEDURE — 93005 ELECTROCARDIOGRAM TRACING: CPT

## 2023-05-27 PROCEDURE — 85730 THROMBOPLASTIN TIME PARTIAL: CPT

## 2023-05-27 PROCEDURE — 96374 THER/PROPH/DIAG INJ IV PUSH: CPT

## 2023-05-27 PROCEDURE — 36415 COLL VENOUS BLD VENIPUNCTURE: CPT

## 2023-05-27 PROCEDURE — 84132 ASSAY OF SERUM POTASSIUM: CPT

## 2023-05-27 PROCEDURE — 84295 ASSAY OF SERUM SODIUM: CPT

## 2023-05-27 PROCEDURE — 80048 BASIC METABOLIC PNL TOTAL CA: CPT

## 2023-05-27 PROCEDURE — 96376 TX/PRO/DX INJ SAME DRUG ADON: CPT

## 2023-05-27 PROCEDURE — C1769: CPT

## 2023-05-27 PROCEDURE — 82962 GLUCOSE BLOOD TEST: CPT

## 2023-05-27 PROCEDURE — 85027 COMPLETE CBC AUTOMATED: CPT

## 2023-05-27 PROCEDURE — 82553 CREATINE MB FRACTION: CPT

## 2023-05-27 PROCEDURE — 85025 COMPLETE CBC W/AUTO DIFF WBC: CPT

## 2023-05-27 PROCEDURE — 82803 BLOOD GASES ANY COMBINATION: CPT

## 2023-05-27 PROCEDURE — C1894: CPT

## 2023-05-27 PROCEDURE — 71275 CT ANGIOGRAPHY CHEST: CPT | Mod: MA

## 2023-05-27 PROCEDURE — 99233 SBSQ HOSP IP/OBS HIGH 50: CPT

## 2023-05-27 PROCEDURE — 93306 TTE W/DOPPLER COMPLETE: CPT

## 2023-05-27 PROCEDURE — 84100 ASSAY OF PHOSPHORUS: CPT

## 2023-05-27 PROCEDURE — 85610 PROTHROMBIN TIME: CPT

## 2023-05-27 PROCEDURE — 80074 ACUTE HEPATITIS PANEL: CPT

## 2023-05-27 PROCEDURE — 71045 X-RAY EXAM CHEST 1 VIEW: CPT

## 2023-05-27 PROCEDURE — 97161 PT EVAL LOW COMPLEX 20 MIN: CPT

## 2023-05-27 PROCEDURE — 80053 COMPREHEN METABOLIC PANEL: CPT

## 2023-05-27 PROCEDURE — 82550 ASSAY OF CK (CPK): CPT

## 2023-05-27 PROCEDURE — 83880 ASSAY OF NATRIURETIC PEPTIDE: CPT

## 2023-05-27 PROCEDURE — 83036 HEMOGLOBIN GLYCOSYLATED A1C: CPT

## 2023-05-27 PROCEDURE — 84484 ASSAY OF TROPONIN QUANT: CPT

## 2023-05-27 PROCEDURE — C1887: CPT

## 2023-05-27 PROCEDURE — 99285 EMERGENCY DEPT VISIT HI MDM: CPT

## 2023-05-27 PROCEDURE — 82330 ASSAY OF CALCIUM: CPT

## 2023-05-27 RX ORDER — EZETIMIBE 10 MG/1
1 TABLET ORAL
Refills: 0 | DISCHARGE

## 2023-05-27 RX ORDER — FAMOTIDINE 10 MG/ML
1 INJECTION INTRAVENOUS
Qty: 0 | Refills: 0 | DISCHARGE

## 2023-05-27 RX ORDER — INSULIN GLARGINE 100 [IU]/ML
5 INJECTION, SOLUTION SUBCUTANEOUS
Qty: 0 | Refills: 0 | DISCHARGE

## 2023-05-27 RX ORDER — CLOPIDOGREL BISULFATE 75 MG/1
1 TABLET, FILM COATED ORAL
Qty: 30 | Refills: 11
Start: 2023-05-27 | End: 2024-05-20

## 2023-05-27 RX ORDER — INSULIN GLARGINE 100 [IU]/ML
5 INJECTION, SOLUTION SUBCUTANEOUS
Refills: 0 | DISCHARGE

## 2023-05-27 RX ORDER — POTASSIUM CHLORIDE 20 MEQ
20 PACKET (EA) ORAL ONCE
Refills: 0 | Status: COMPLETED | OUTPATIENT
Start: 2023-05-27 | End: 2023-05-27

## 2023-05-27 RX ORDER — ROSUVASTATIN CALCIUM 5 MG/1
1 TABLET ORAL
Qty: 30 | Refills: 3
Start: 2023-05-27 | End: 2023-09-23

## 2023-05-27 RX ORDER — ROSUVASTATIN CALCIUM 5 MG/1
1 TABLET ORAL
Refills: 0 | DISCHARGE

## 2023-05-27 RX ORDER — EZETIMIBE 10 MG/1
1 TABLET ORAL
Qty: 30 | Refills: 3
Start: 2023-05-27 | End: 2023-09-23

## 2023-05-27 RX ORDER — ASPIRIN/CALCIUM CARB/MAGNESIUM 324 MG
1 TABLET ORAL
Qty: 30 | Refills: 11
Start: 2023-05-27 | End: 2024-05-20

## 2023-05-27 RX ADMIN — INSULIN GLARGINE 10 UNIT(S): 100 INJECTION, SOLUTION SUBCUTANEOUS at 07:28

## 2023-05-27 RX ADMIN — Medication 20 MILLIEQUIVALENT(S): at 09:40

## 2023-05-27 RX ADMIN — AMLODIPINE BESYLATE 5 MILLIGRAM(S): 2.5 TABLET ORAL at 06:06

## 2023-05-27 RX ADMIN — Medication 81 MILLIGRAM(S): at 11:03

## 2023-05-27 RX ADMIN — PANTOPRAZOLE SODIUM 40 MILLIGRAM(S): 20 TABLET, DELAYED RELEASE ORAL at 06:05

## 2023-05-27 RX ADMIN — LISINOPRIL 40 MILLIGRAM(S): 2.5 TABLET ORAL at 06:06

## 2023-05-27 RX ADMIN — CLOPIDOGREL BISULFATE 75 MILLIGRAM(S): 75 TABLET, FILM COATED ORAL at 11:03

## 2023-05-27 NOTE — DISCHARGE NOTE PROVIDER - NSDCMRMEDTOKEN_GEN_ALL_CORE_FT
amLODIPine 5 mg oral tablet: 1 tab(s) orally once a day   Aspirin Enteric Coated 81 mg oral delayed release tablet: 1 tab(s) orally once a day  Farxiga 10 mg oral tablet: 1 orally once a day  Lantus 100 units/mL subcutaneous solution: 10 unit(s) subcutaneous once a day (in the morning)  Lantus 100 units/mL subcutaneous solution: 5 unit(s) subcutaneous once a day (at bedtime)  Plavix 75 mg oral tablet: 1 tab(s) orally once a day  Protonix 40 mg oral delayed release tablet: 1 tab(s) orally once a day  ramipril 10 mg oral tablet: orally once a day  rosuvastatin 40 mg oral tablet: 1 orally once a day  Zetia 10 mg oral tablet: 1 orally once a day   amLODIPine 5 mg oral tablet: 1 tab(s) orally once a day   Aspirin Enteric Coated 81 mg oral delayed release tablet: 1 tab(s) orally once a day  famotidine 40 mg oral tablet: 1 tab(s) orally once a day  Farxiga 10 mg oral tablet: 1 orally once a day  Lantus 100 units/mL subcutaneous solution: 10 unit(s) subcutaneous once a day (in the morning)  Plavix 75 mg oral tablet: 1 tab(s) orally once a day  Protonix 40 mg oral delayed release tablet: 1 tab(s) orally once a day  ramipril 10 mg oral tablet: orally once a day  rosuvastatin 40 mg oral tablet: 1 tab(s) orally once a day  Zetia 10 mg oral tablet: 1 tablet orally once a day   amLODIPine 5 mg oral tablet: 1 tab(s) orally once a day   Aspirin Enteric Coated 81 mg oral delayed release tablet: 1 tab(s) orally once a day  famotidine 40 mg oral tablet: 1 tab(s) orally once a day  Farxiga 10 mg oral tablet: 1 orally once a day  Lantus 100 units/mL subcutaneous solution: 10 unit(s) subcutaneous once a day (in the morning)  Lantus 100 units/mL subcutaneous solution: 5 unit(s) subcutaneous once a day (at bedtime)  Plavix 75 mg oral tablet: 1 tab(s) orally once a day  Protonix 40 mg oral delayed release tablet: 1 tab(s) orally once a day  ramipril 10 mg oral tablet: orally once a day  rosuvastatin 40 mg oral tablet: 1 tab(s) orally once a day  Zetia 10 mg oral tablet: 1 tablet orally once a day

## 2023-05-27 NOTE — DISCHARGE NOTE PROVIDER - CARE PROVIDER_API CALL
Gisela Duggan  Cardiovascular Disease  [phone] [place] [id number]  Phone: [phone]  Fax: [phone]  Follow Up Time:

## 2023-05-27 NOTE — DISCHARGE NOTE PROVIDER - NSDCCPCAREPLAN_GEN_ALL_CORE_FT
PRINCIPAL DISCHARGE DIAGNOSIS  Diagnosis: Unstable angina  Assessment and Plan of Treatment: You have a history of CAD. You underwent a cardiac catheterization on 5/27/23 and did not receive any stents. PLEASE CONTINUE ASPIRIN 81MG DAILY AND PLAVIX 75MG DAILY. DO NOT STOP THESE MEDICATIONS FOR ANY REASON AS THEY ARE KEEPING YOUR PRIOR STENTS OPEN AND PREVENTING A HEART ATTACK.   Avoid strenuous activity or heavy lifting anything more than 5lbs for the next five days. Do not take a bath or swim for the next five days; you may shower. For any bleeding or hematoma formation (hardened blood collection under the skin) at the access site of your wrist please hold pressure and go to the emergency room. Please follow up with  ___ in 1-2 weeks. For recurrent chest pain, please call your doctor or go to the emergency room.      SECONDARY DISCHARGE DIAGNOSES  Diagnosis: Insulin dependent type 2 diabetes mellitus  Assessment and Plan of Treatment: Your Hemoglobin A1c is 8.0 and your goal A1c is less than 7.0%. This number measures your average blood sugar level over the last three months.  Please continue Lantus 5 units at bedtime and 10 units in the morning as listed for diabetes. Maintain a low carbohydrate, low sugar diet, exercise, monitor your fingerstick blood sugars regarly and follow up with your Endocrinologist/Primary Care Doctor.    Diagnosis: Stage 4 chronic kidney disease  Assessment and Plan of Treatment: You have a known history of chronic kidney disease and your baseline Creatinine (kidney function) is . While you were in the hospital, your kidney function was elevated due to the heart failure and volume overload. Your kidney function has now improved back to baseline with the diuresis, prior to dicharge your Creatinine was 1.2. Please follow up with your nephrologist as scheduled.    Diagnosis: Hypertension  Assessment and Plan of Treatment: Please continue amlodipine 5mg, ramipril 40mg as listed to keep your blood pressure controlled. For blood pressure that is too high or too low please see your doctor or go to the emergency room as necessary.    Diagnosis: HLD (hyperlipidemia)  Assessment and Plan of Treatment: Please continue atorvastatin 80mg at bedtime to keep your cholesterol low. High cholesterol contributes to heart disease.    Diagnosis: Abnormal liver enzymes  Assessment and Plan of Treatment:      PRINCIPAL DISCHARGE DIAGNOSIS  Diagnosis: Unstable angina  Assessment and Plan of Treatment: You have a history of CAD. You underwent a cardiac catheterization on 5/27/23 and did NOT receive any stents. PLEASE CONTINUE ASPIRIN 81MG DAILY AND PLAVIX 75MG DAILY. DO NOT STOP THESE MEDICATIONS FOR ANY REASON AS THEY ARE KEEPING YOUR PRIOR STENTS OPEN AND PREVENTING A HEART ATTACK.   Avoid strenuous activity or heavy lifting anything more than 5lbs for the next five days. Do not take a bath or swim for the next five days; you may shower. For any bleeding or hematoma formation (hardened blood collection under the skin) at the access site of your wrist please hold pressure and go to the emergency room. Please follow up with Dr. Duggan in 1-2 weeks. For recurrent chest pain, please call your doctor or go to the emergency room.      SECONDARY DISCHARGE DIAGNOSES  Diagnosis: Insulin dependent type 2 diabetes mellitus  Assessment and Plan of Treatment: Your Hemoglobin A1c is 8.0 and your goal A1c is less than 7.0%. This number measures your average blood sugar level over the last three months.  Please continue Lantus 5 units at bedtime and 10 units in the morning as listed for diabetes. Maintain a low carbohydrate, low sugar diet, exercise, monitor your fingerstick blood sugars regarly and follow up with your Endocrinologist/Primary Care Doctor.    Diagnosis: Stage 4 chronic kidney disease  Assessment and Plan of Treatment: You have a known history of chronic kidney disease and your baseline Creatinine (kidney function) is . While you were in the hospital, your kidney function was elevated due to the heart failure and volume overload. Your kidney function has now improved back to baseline with the diuresis, prior to dicharge your Creatinine was 1.2. Please follow up with your nephrologist as scheduled.    Diagnosis: Hypertension  Assessment and Plan of Treatment: Please continue amlodipine 5mg, ramipril 40mg as listed to keep your blood pressure controlled. For blood pressure that is too high or too low please see your doctor or go to the emergency room as necessary.    Diagnosis: HLD (hyperlipidemia)  Assessment and Plan of Treatment: Please continue rosuvastatin 40mg at bedtime to keep your cholesterol low. High cholesterol contributes to heart disease.    Diagnosis: Abnormal liver enzymes  Assessment and Plan of Treatment:      PRINCIPAL DISCHARGE DIAGNOSIS  Diagnosis: Unstable angina  Assessment and Plan of Treatment: You have a history of CAD. You underwent a cardiac catheterization on 5/27/23 and did NOT receive any stents. PLEASE CONTINUE ASPIRIN 81MG DAILY AND PLAVIX 75MG DAILY. DO NOT STOP THESE MEDICATIONS FOR ANY REASON AS THEY ARE KEEPING YOUR PRIOR STENTS OPEN AND PREVENTING A HEART ATTACK.   Avoid strenuous activity or heavy lifting anything more than 5lbs for the next five days. Do not take a bath or swim for the next five days; you may shower. For any bleeding or hematoma formation (hardened blood collection under the skin) at the access site of your wrist please hold pressure and go to the emergency room. Please follow up with Dr. Duggan in 1-2 weeks. For recurrent chest pain, please call your doctor or go to the emergency room.      SECONDARY DISCHARGE DIAGNOSES  Diagnosis: Insulin dependent type 2 diabetes mellitus  Assessment and Plan of Treatment: Your Hemoglobin A1c is 8.0 and your goal A1c is less than 7.0%. This number measures your average blood sugar level over the last three months.  Please continue Lantus 5 units at bedtime and 10 units in the morning as listed for diabetes. Maintain a low carbohydrate, low sugar diet, exercise, monitor your fingerstick blood sugars regarly and follow up with your Endocrinologist/Primary Care Doctor.    Diagnosis: Stage 4 chronic kidney disease  Assessment and Plan of Treatment: You have a known history of chronic kidney disease and your baseline Creatinine (kidney function) is . While you were in the hospital, your kidney function was elevated due to the heart failure and volume overload. Your kidney function has now improved back to baseline with the diuresis, prior to dicharge your Creatinine was 1.2. Please follow up with your nephrologist as scheduled.    Diagnosis: Hypertension  Assessment and Plan of Treatment: Please continue amlodipine 5mg, ramipril 40mg as listed to keep your blood pressure controlled. For blood pressure that is too high or too low please see your doctor or go to the emergency room as necessary.    Diagnosis: HLD (hyperlipidemia)  Assessment and Plan of Treatment: Please continue rosuvastatin 40mg at bedtime to keep your cholesterol low. High cholesterol contributes to heart disease.    Diagnosis: Abnormal liver enzymes  Assessment and Plan of Treatment: Your liver enzymes were elevated during your admission at [facility]. Please follow up with your primary care doctor. A hepatits panel was ordered.

## 2023-05-27 NOTE — DISCHARGE NOTE PROVIDER - HOSPITAL COURSE
67y obese female, current smoker, w/ PMHx HLD, IDDM T2 (a1c 9.4 11/2020), NSTEMI/CAD s/p PCI 09/2018 at [facility], DERIK mid RCA and s/p DERIK pRCA, DERIK pLAD in 11/2020, IBS, GERD, and hx of nephrolithiasis presented to [facility] for recommended cardiac cath w/ possible intervention if clinically indicated, in light of pts risk factors, CCS class III anginal symptoms and h/o prior stents. Pt now s/p cardiac cath 5/26/23: : RCA large dominant patent stent with 30% ISR; RPDA/RPLS small mild diffuse; LM large minor; LAD large patent with 30% ISR; D1 medium size minor; Ramus large mild dz; LCx large prox 30% tubular; OM1 medium size mid 30%; EDP 17, access right radial. Pt admitted overnight for observation and telemetry monitoring. Pt seen and examined at bedside this AM without any complaints or events overnight, VSS, labs and telemetry reviewed and pt stable for discharge as discussed with Dr. Yepez. Pt has received appropriate discharge instructions, including medication regimen, access site management and follow up with  __ in 1-2 weeks.    Discharge medications: ASA 81mg QD, Plavix 75mg QD, zetia 10mg QD, farxiga 10mg QD, rosuvastatin 40mg QD, amlodipine 5mg QD, ramipril 10mg QD, protonix 40mg, Lantus 100 units/mL QD.    Cardiac Rehab (Post PCI): Education on benefits of Cardiac Rehab provided to patient: Yes, Referral and Prescription Given for Cardiac Rehab: Yes, Pt given list of locations & instructed to contact their insurance company to review list of participating providers.        67y obese female, current smoker, w/ PMHx HLD, IDDM T2 (a1c 9.4 11/2020), NSTEMI/CAD s/p PCI 09/2018 at [facility], DERIK mid RCA and s/p DERIK pRCA, DERIK pLAD in 11/2020, IBS, GERD, and hx of nephrolithiasis presented to [facility] for recommended cardiac cath w/ possible intervention if clinically indicated, in light of pts risk factors, CCS class III anginal symptoms and h/o prior stents. Pt now s/p cardiac cath 5/26/23: : RCA large dominant patent stent with 30% ISR; RPDA/RPLS small mild diffuse; LM large minor; LAD large patent with 30% ISR; D1 medium size minor; Ramus large mild dz; LCx large prox 30% tubular; OM1 medium size mid 30%; EDP 17, access right radial. Pt admitted overnight for observation and telemetry monitoring. Pt seen and examined at bedside this AM without any complaints or events overnight, VSS, labs and telemetry reviewed and pt stable for discharge as discussed with Dr. Yepez. Pt has received appropriate discharge instructions, including medication regimen, access site management and follow up with Dr. Duggan in 1-2 weeks.    Discharge medications: ASA 81mg QD, Plavix 75mg QD, zetia 10mg QD, farxiga 10mg QD, rosuvastatin 40mg QD, amlodipine 5mg QD, ramipril 10mg QD, protonix 40mg, Lantus 100 units/mL QD, famotidine 40mg QD.    Cardiac Rehab (Post PCI): Education on benefits of Cardiac Rehab provided to patient: Yes, Referral and Prescription Given for Cardiac Rehab: Yes, Pt given list of locations & instructed to contact their insurance company to review list of participating providers.        67y obese female, current smoker, w/ PMHx HLD, IDDM T2 (a1c 9.4 11/2020), NSTEMI/CAD s/p PCI 09/2018 at [facility], DERIK mid RCA and s/p DERIK pRCA, DERIK pLAD in 11/2020, IBS, GERD, and hx of nephrolithiasis who presented to [facility] ED c/o left sided chest pain for 2 days PTA. She was found to have an unremarkable ECG and cardiac enzymes negative x 3 with a negative PE on CTPE prelim read. She also had an abnormal liver chemistry panel with elevated AST/ALT and ALP consistent with likely DEL REAL. Given her hx of similar presentation in 11/2020, she was admitted to cardiac telemetry for further ischemic w/u.    Recommended cardiac cath w/ possible intervention if clinically indicated, in light of pts risk factors, CCS class III anginal symptoms and h/o prior stents. Pt now s/p cardiac cath 5/26/23: RCA large dominant patent stent with 30% ISR; RPDA/RPLS small mild diffuse; LM large minor; LAD large patent with 30% ISR; D1 medium size minor; Ramus large mild dz; LCx large prox 30% tubular; OM1 medium size mid 30%; EDP 17, access right radial. Pt admitted overnight for observation and telemetry monitoring. Pt seen and examined at bedside this AM without any complaints or events overnight, VSS, labs and telemetry reviewed and pt stable for discharge as discussed with Dr. Yepez. Pt has received appropriate discharge instructions, including medication regimen, access site management and follow up with Dr. Duggan in 1-2 weeks.    Discharge medications: ASA 81mg QD, Plavix 75mg QD, zetia 10mg QD, farxiga 10mg QD, rosuvastatin 40mg QD, amlodipine 5mg QD, ramipril 10mg QD, protonix 40mg, Lantus 100 units/mL QD, famotidine 40mg QD.

## 2023-05-27 NOTE — DISCHARGE NOTE NURSING/CASE MANAGEMENT/SOCIAL WORK - PATIENT PORTAL LINK FT
You can access the FollowMyHealth Patient Portal offered by [facility] by registering at the following website: [email or web address]. By joining [employer]’s FollowMyHealth portal, you will also be able to view your health information using other applications (apps) compatible with our system.

## 2023-05-27 NOTE — DISCHARGE NOTE PROVIDER - ATTENDING ATTESTATION STATEMENT
Pt extremely anxious this morning. Keeps stating \"Ask the Dr to put me out. \" Approx. [phone], pt stated \"Please, ask the Dr to put me out. I can't take this. I'd end it if my leandro allowed. \" This nurse continue to listen to patient's frustration about \"not getting better\". He stated he has been short of breath for weeks and he thought he would be able to breathe easier by now. He then asked again \"Please just put me out. \" MD made aware. One time dose of ativan 0.5 mg IV administered to alleviate pt anxiety. I have personally seen and examined the patient. I have collaborated with and supervised the

## 2023-05-31 DIAGNOSIS — Z79.4 LONG TERM (CURRENT) USE OF INSULIN: ICD-10-CM

## 2023-05-31 DIAGNOSIS — I25.110 ATHEROSCLEROTIC HEART DISEASE OF NATIVE CORONARY ARTERY WITH UNSTABLE ANGINA PECTORIS: ICD-10-CM

## 2023-05-31 DIAGNOSIS — I25.2 OLD MYOCARDIAL INFARCTION: ICD-10-CM

## 2023-05-31 DIAGNOSIS — G47.33 OBSTRUCTIVE SLEEP APNEA (ADULT) (PEDIATRIC): ICD-10-CM

## 2023-05-31 DIAGNOSIS — N18.4 CHRONIC KIDNEY DISEASE, STAGE 4 (SEVERE): ICD-10-CM

## 2023-05-31 DIAGNOSIS — R06.02 SHORTNESS OF BREATH: ICD-10-CM

## 2023-05-31 DIAGNOSIS — Z79.82 LONG TERM (CURRENT) USE OF ASPIRIN: ICD-10-CM

## 2023-05-31 DIAGNOSIS — E11.22 TYPE 2 DIABETES MELLITUS WITH DIABETIC CHRONIC KIDNEY DISEASE: ICD-10-CM

## 2023-05-31 DIAGNOSIS — I47.20 VENTRICULAR TACHYCARDIA, UNSPECIFIED: ICD-10-CM

## 2023-05-31 DIAGNOSIS — K58.9 IRRITABLE BOWEL SYNDROME WITHOUT DIARRHEA: ICD-10-CM

## 2023-05-31 DIAGNOSIS — K21.9 GASTRO-ESOPHAGEAL REFLUX DISEASE WITHOUT ESOPHAGITIS: ICD-10-CM

## 2023-05-31 DIAGNOSIS — K75.81 NONALCOHOLIC STEATOHEPATITIS (NASH): ICD-10-CM

## 2023-05-31 DIAGNOSIS — F17.210 NICOTINE DEPENDENCE, CIGARETTES, UNCOMPLICATED: ICD-10-CM

## 2023-05-31 DIAGNOSIS — E66.9 OBESITY, UNSPECIFIED: ICD-10-CM

## 2023-05-31 DIAGNOSIS — E78.5 HYPERLIPIDEMIA, UNSPECIFIED: ICD-10-CM

## 2023-05-31 DIAGNOSIS — Z79.02 LONG TERM (CURRENT) USE OF ANTITHROMBOTICS/ANTIPLATELETS: ICD-10-CM

## 2023-05-31 DIAGNOSIS — Z95.5 PRESENCE OF CORONARY ANGIOPLASTY IMPLANT AND GRAFT: ICD-10-CM

## 2023-05-31 DIAGNOSIS — Z88.8 ALLERGY STATUS TO OTHER DRUGS, MEDICAMENTS AND BIOLOGICAL SUBSTANCES: ICD-10-CM

## 2023-05-31 DIAGNOSIS — I12.9 HYPERTENSIVE CHRONIC KIDNEY DISEASE WITH STAGE 1 THROUGH STAGE 4 CHRONIC KIDNEY DISEASE, OR UNSPECIFIED CHRONIC KIDNEY DISEASE: ICD-10-CM

## 2023-11-06 NOTE — PATIENT PROFILE ADULT. - ABILITY TO HEAR (WITH HEARING AID OR HEARING APPLIANCE IF NORMALLY USED):
Adequate: hears normal conversation without difficulty Elidel Counseling: Patient may experience a mild burning sensation during topical application. Elidel is not approved in children less than 2 years of age. There have been case reports of hematologic and skin malignancies in patients using topical calcineurin inhibitors although causality is questionable.

## 2024-07-09 NOTE — ED PROVIDER NOTE - CARE PLAN
-Please take antibiotic to completion.   Be aware the cough syrup may cause drowsiness.  Your chest xray was negative.    Below are suggestions for symptomatic relief:   -Tylenol every 4 hours OR ibuprofen every 6 hours as needed for pain/fever.   -Salt water gargles to soothe throat pain.   -Chloroseptic spray also helps to numb throat pain.   -Nasal saline spray reduces inflammation and dryness.   -Warm face compresses to help with facial sinus pain/pressure.   -Vicks vapor rub at night.   -Flonase OTC or Nasacort OTC for nasal congestion.   -Simple foods like chicken noodle soup.   -Delsym helps with coughing at night   -Zyrtec/Claritin during the day & Benadryl at night may help with allergies.     If you DO NOT have Hypertension or any history of palpitations, it is ok to take over the counter Sudafed or Mucinex D or Allegra-D or Claritin-D or Zyrtec-D.  If you do take one of the above, it is ok to combine that with plain over the counter Mucinex or Allegra or Claritin or Zyrtec. If, for example, you are taking Zyrtec -D, you can combine that with Mucinex, but not Mucinex-D.  If you are taking Mucinex-D, you can combine that with plain Allegra or Claritin or Zyrtec.   If you DO have Hypertension or palpitations, it is safe to take Coricidin HBP for relief of sinus symptoms.    Please follow up with your primary care provider within 2-5 days if your signs and symptoms have not resolved or worsen.     If your condition worsens or fails to improve we recommend that you receive another evaluation at the emergency room immediately or contact your primary medical clinic to discuss your concerns.   You must understand that you have received an Urgent Care treatment only and that you may be released before all of your medical problems are known or treated. You, the patient, will arrange for follow up care as instructed.     1 Principal Discharge DX:	Unstable angina

## 2024-09-05 NOTE — H&P ADULT - NSVTERISKASSESS_GEN_ALL_CORE FT
----- Message from JAKOB Arredondo - NP sent at 9/4/2024  4:46 PM EDT -----  + BV  Flagyl 500mg PO BID x 7 days    Medical Assessment Completed on: 26-May-2023 06:41

## 2024-09-23 NOTE — DISCHARGE NOTE NURSING/CASE MANAGEMENT/SOCIAL WORK - NSDCPETBCESMAN_GEN_ALL_CORE
In addition to the advanced practice provider, I personally saw Jenna Merida and performed a substantive portion of the visit including all aspects of the medical decision making.    Medical Decision Making  76-year-old female presents to the ER for 2 weeks of intermittent headaches, fever, chills, productive cough, shortness of breath, nausea and diarrhea.  Patient feels generally weak.  Patient also has no appetite.  Patient denies hematuria and dysuria.  She also states she feels dehydrated.  She also has a history of COPD and asbestosis exposure.  Patient does have a history of kidney cancer but is not undergoing radiation or chemotherapy.  Labs are reassuring.  COVID-19 test was found to be positive.  X-ray shows a small focal airspace disease in the central right lung base.  CT of the lung shows complete opacification of the bronchus intermedius with peripheral airway opacification in the right middle and lower lobes.  Lobar pneumonia does not appear to be consistent with COVID.  Patient is going to be admitted for further evaluation and treatment.  She is hemodynamically stable.  A call was placed to hospitalist who accepted the admission.  Condition is guarded. I agree with the assessment and plan        EKG  The Ekg interpreted by me in the absence of a cardiologist shows.  normal sinus rhythm with a rate of 71  Axis is   Left axis deviation  QTc is  normal  Intervals and Durations are unremarkable.      No specific ST-T wave changes appreciated.  No evidence of acute ischemia.   No significant change from prior EKG dated 2/8/2023    SEP-1  Is this patient to be included in the SEP-1 Core Measure due to severe sepsis or septic shock?   No    Screenings  NIH Stroke Scale  Interval: Baseline  Level of Consciousness (1a): Alert  LOC Questions (1b): Answers both correctly  LOC Commands (1c): Performs both tasks correctly  Best Gaze (2): Normal  Visual (3): No visual loss  Facial Palsy (4): Normal  If you are a smoker, it is important for your health to stop smoking. Please be aware that second hand smoke is also harmful.

## 2025-03-23 ENCOUNTER — EMERGENCY (EMERGENCY)
Facility: HOSPITAL | Age: 70
LOS: 1 days | Discharge: HOME | End: 2025-03-23
Attending: STUDENT IN AN ORGANIZED HEALTH CARE EDUCATION/TRAINING PROGRAM | Admitting: STUDENT IN AN ORGANIZED HEALTH CARE EDUCATION/TRAINING PROGRAM
Payer: MEDICARE

## 2025-03-23 VITALS
WEIGHT: 250 LBS | SYSTOLIC BLOOD PRESSURE: 118 MMHG | HEIGHT: 67 IN | TEMPERATURE: 98 F | HEART RATE: 78 BPM | RESPIRATION RATE: 18 BRPM | DIASTOLIC BLOOD PRESSURE: 79 MMHG | OXYGEN SATURATION: 100 %

## 2025-03-23 VITALS
DIASTOLIC BLOOD PRESSURE: 53 MMHG | RESPIRATION RATE: 18 BRPM | TEMPERATURE: 98 F | SYSTOLIC BLOOD PRESSURE: 115 MMHG | HEART RATE: 69 BPM | OXYGEN SATURATION: 95 %

## 2025-03-23 DIAGNOSIS — Z95.5 PRESENCE OF CORONARY ANGIOPLASTY IMPLANT AND GRAFT: Chronic | ICD-10-CM

## 2025-03-23 DIAGNOSIS — Z98.891 HISTORY OF UTERINE SCAR FROM PREVIOUS SURGERY: Chronic | ICD-10-CM

## 2025-03-23 LAB
ALBUMIN SERPL ELPH-MCNC: 3.8 G/DL — SIGNIFICANT CHANGE UP (ref 3.3–5)
ALP SERPL-CCNC: 117 U/L — SIGNIFICANT CHANGE UP (ref 40–120)
ALT FLD-CCNC: SIGNIFICANT CHANGE UP (ref 10–45)
ANION GAP SERPL CALC-SCNC: 11 MMOL/L — SIGNIFICANT CHANGE UP (ref 5–17)
ANION GAP SERPL CALC-SCNC: 17 MMOL/L — SIGNIFICANT CHANGE UP (ref 5–17)
APTT BLD: 25.9 SEC — SIGNIFICANT CHANGE UP (ref 24.5–35.6)
AST SERPL-CCNC: SIGNIFICANT CHANGE UP (ref 10–40)
BASOPHILS # BLD AUTO: 0.06 K/UL — SIGNIFICANT CHANGE UP (ref 0–0.2)
BASOPHILS NFR BLD AUTO: 0.5 % — SIGNIFICANT CHANGE UP (ref 0–2)
BILIRUB SERPL-MCNC: 1 MG/DL — SIGNIFICANT CHANGE UP (ref 0.2–1.2)
BUN SERPL-MCNC: 44 MG/DL — HIGH (ref 7–23)
BUN SERPL-MCNC: 47 MG/DL — HIGH (ref 7–23)
CALCIUM SERPL-MCNC: 9 MG/DL — SIGNIFICANT CHANGE UP (ref 8.4–10.5)
CALCIUM SERPL-MCNC: 9.2 MG/DL — SIGNIFICANT CHANGE UP (ref 8.4–10.5)
CHLORIDE SERPL-SCNC: 92 MMOL/L — LOW (ref 96–108)
CHLORIDE SERPL-SCNC: 94 MMOL/L — LOW (ref 96–108)
CO2 SERPL-SCNC: 29 MMOL/L — SIGNIFICANT CHANGE UP (ref 22–31)
CO2 SERPL-SCNC: 33 MMOL/L — HIGH (ref 22–31)
CREAT SERPL-MCNC: 1.75 MG/DL — HIGH (ref 0.5–1.3)
CREAT SERPL-MCNC: 1.78 MG/DL — HIGH (ref 0.5–1.3)
EGFR: 31 ML/MIN/1.73M2 — LOW
EOSINOPHIL # BLD AUTO: 0.03 K/UL — SIGNIFICANT CHANGE UP (ref 0–0.5)
EOSINOPHIL NFR BLD AUTO: 0.3 % — SIGNIFICANT CHANGE UP (ref 0–6)
GLUCOSE SERPL-MCNC: 148 MG/DL — HIGH (ref 70–99)
GLUCOSE SERPL-MCNC: 165 MG/DL — HIGH (ref 70–99)
HCT VFR BLD CALC: 44.2 % — SIGNIFICANT CHANGE UP (ref 34.5–45)
HGB BLD-MCNC: 15.2 G/DL — SIGNIFICANT CHANGE UP (ref 11.5–15.5)
IMM GRANULOCYTES NFR BLD AUTO: 0.4 % — SIGNIFICANT CHANGE UP (ref 0–0.9)
INR BLD: 1.35 — HIGH (ref 0.85–1.16)
LIDOCAIN IGE QN: 24 U/L — SIGNIFICANT CHANGE UP (ref 7–60)
LYMPHOCYTES # BLD AUTO: 2.38 K/UL — SIGNIFICANT CHANGE UP (ref 1–3.3)
LYMPHOCYTES # BLD AUTO: 21.1 % — SIGNIFICANT CHANGE UP (ref 13–44)
MAGNESIUM SERPL-MCNC: 2 MG/DL — SIGNIFICANT CHANGE UP (ref 1.6–2.6)
MCHC RBC-ENTMCNC: 29.9 PG — SIGNIFICANT CHANGE UP (ref 27–34)
MCHC RBC-ENTMCNC: 34.4 G/DL — SIGNIFICANT CHANGE UP (ref 32–36)
MCV RBC AUTO: 86.8 FL — SIGNIFICANT CHANGE UP (ref 80–100)
MONOCYTES # BLD AUTO: 0.83 K/UL — SIGNIFICANT CHANGE UP (ref 0–0.9)
MONOCYTES NFR BLD AUTO: 7.4 % — SIGNIFICANT CHANGE UP (ref 2–14)
NEUTROPHILS # BLD AUTO: 7.93 K/UL — HIGH (ref 1.8–7.4)
NEUTROPHILS NFR BLD AUTO: 70.3 % — SIGNIFICANT CHANGE UP (ref 43–77)
NRBC BLD AUTO-RTO: 0 /100 WBCS — SIGNIFICANT CHANGE UP (ref 0–0)
PHOSPHATE SERPL-MCNC: 2.8 MG/DL — SIGNIFICANT CHANGE UP (ref 2.5–4.5)
PLATELET # BLD AUTO: 236 K/UL — SIGNIFICANT CHANGE UP (ref 150–400)
POTASSIUM SERPL-MCNC: 3.4 MMOL/L — LOW (ref 3.5–5.3)
POTASSIUM SERPL-MCNC: SIGNIFICANT CHANGE UP (ref 3.5–5.3)
POTASSIUM SERPL-SCNC: 3.4 MMOL/L — LOW (ref 3.5–5.3)
POTASSIUM SERPL-SCNC: SIGNIFICANT CHANGE UP (ref 3.5–5.3)
PROT SERPL-MCNC: 7.5 G/DL — SIGNIFICANT CHANGE UP (ref 6–8.3)
PROTHROM AB SERPL-ACNC: 15.5 SEC — HIGH (ref 9.9–13.4)
RBC # BLD: 5.09 M/UL — SIGNIFICANT CHANGE UP (ref 3.8–5.2)
RBC # FLD: 12.9 % — SIGNIFICANT CHANGE UP (ref 10.3–14.5)
SODIUM SERPL-SCNC: 138 MMOL/L — SIGNIFICANT CHANGE UP (ref 135–145)
SODIUM SERPL-SCNC: 138 MMOL/L — SIGNIFICANT CHANGE UP (ref 135–145)
TROPONIN T, HIGH SENSITIVITY RESULT: 43 NG/L — SIGNIFICANT CHANGE UP (ref 0–51)
TROPONIN T, HIGH SENSITIVITY RESULT: 47 NG/L — SIGNIFICANT CHANGE UP (ref 0–51)
WBC # BLD: 11.28 K/UL — HIGH (ref 3.8–10.5)
WBC # FLD AUTO: 11.28 K/UL — HIGH (ref 3.8–10.5)

## 2025-03-23 PROCEDURE — 84484 ASSAY OF TROPONIN QUANT: CPT

## 2025-03-23 PROCEDURE — 85025 COMPLETE CBC W/AUTO DIFF WBC: CPT

## 2025-03-23 PROCEDURE — 85610 PROTHROMBIN TIME: CPT

## 2025-03-23 PROCEDURE — 85730 THROMBOPLASTIN TIME PARTIAL: CPT

## 2025-03-23 PROCEDURE — 36415 COLL VENOUS BLD VENIPUNCTURE: CPT

## 2025-03-23 PROCEDURE — 83735 ASSAY OF MAGNESIUM: CPT

## 2025-03-23 PROCEDURE — 71045 X-RAY EXAM CHEST 1 VIEW: CPT

## 2025-03-23 PROCEDURE — 96361 HYDRATE IV INFUSION ADD-ON: CPT

## 2025-03-23 PROCEDURE — 71045 X-RAY EXAM CHEST 1 VIEW: CPT | Mod: 26

## 2025-03-23 PROCEDURE — 84100 ASSAY OF PHOSPHORUS: CPT

## 2025-03-23 PROCEDURE — 96374 THER/PROPH/DIAG INJ IV PUSH: CPT

## 2025-03-23 PROCEDURE — 99285 EMERGENCY DEPT VISIT HI MDM: CPT | Mod: 25

## 2025-03-23 PROCEDURE — 93010 ELECTROCARDIOGRAM REPORT: CPT

## 2025-03-23 PROCEDURE — 80053 COMPREHEN METABOLIC PANEL: CPT

## 2025-03-23 PROCEDURE — 93005 ELECTROCARDIOGRAM TRACING: CPT

## 2025-03-23 PROCEDURE — 99284 EMERGENCY DEPT VISIT MOD MDM: CPT

## 2025-03-23 PROCEDURE — 96375 TX/PRO/DX INJ NEW DRUG ADDON: CPT

## 2025-03-23 PROCEDURE — 80048 BASIC METABOLIC PNL TOTAL CA: CPT

## 2025-03-23 PROCEDURE — 83690 ASSAY OF LIPASE: CPT

## 2025-03-23 RX ORDER — SUCRALFATE 1 G
1 TABLET ORAL ONCE
Refills: 0 | Status: COMPLETED | OUTPATIENT
Start: 2025-03-23 | End: 2025-03-23

## 2025-03-23 RX ORDER — SUCRALFATE 1 G
10 TABLET ORAL
Qty: 420 | Refills: 0
Start: 2025-03-23 | End: 2025-04-05

## 2025-03-23 RX ADMIN — Medication 1000 MILLILITER(S): at 17:47

## 2025-03-23 RX ADMIN — Medication 40 MILLIGRAM(S): at 14:52

## 2025-03-23 RX ADMIN — Medication 1 GRAM(S): at 14:55

## 2025-03-23 RX ADMIN — Medication 1000 MILLILITER(S): at 14:39

## 2025-03-23 RX ADMIN — Medication 20 MILLIGRAM(S): at 14:52

## 2025-03-23 NOTE — ED ADULT TRIAGE NOTE - CHIEF COMPLAINT QUOTE
Pt arrived to ED c/o GERD x1 week which is causing chest discomfort this morning. Pt reports b/l feet tingling that started after she started to feel very anxious. Pt reports sob and anxiety at this time. hx of x2 stents. EKG in prog

## 2025-03-23 NOTE — ED PROVIDER NOTE - PROGRESS NOTE DETAILS
Patient feels much improved. Tolerating PO, states that medications helped her immensely which suggests GERD rather than a cardiac etiology. PPi and short supply of Carafate sent. GERD precautions discussed. STRICT return precautions given. Serial cardiac enzymes negative. Patient agrees to follow up with her cardiologist this week.

## 2025-03-23 NOTE — ED ADULT NURSE NOTE - OBJECTIVE STATEMENT
68 y/o F c/o intermittent burning sensation in chest x1 week worsening this morning, endorses feeling "her chest beating fast" this morning, b/l tingling in her feet, "vomiting 3 days in a row bu not today", . Pt denies dizziness, lightheadedness, SOB, diarrhea, fever, chills. Pt A&Ox4, respirations even and unlabored, skin color WDL warm and dry, pt is ambulatory with a steady gait. No acute distress observed.

## 2025-03-23 NOTE — ED PROVIDER NOTE - PATIENT PORTAL LINK FT
You can access the FollowMyHealth Patient Portal offered by [facility] by registering at the following website: [email or web address]. By joining [employer]’s FollowMyHealth portal, you will also be able to view your health information using other applications (apps) compatible with our system.

## 2025-03-23 NOTE — ED PROVIDER NOTE - CARE PROVIDER_API CALL
Pebbles Mora  Gastroenterology  [address]  [place] [address]  Phone: [phone]  Fax: [phone]  Follow Up Time:

## 2025-03-23 NOTE — ED ADULT NURSE REASSESSMENT NOTE - NS ED NURSE REASSESS COMMENT FT1
All labs, tests resulted, symptoms improved s/p meds.  Vital signs stable.  Discharged to home in stable condition.

## 2025-03-23 NOTE — ED PROVIDER NOTE - CLINICAL SUMMARY MEDICAL DECISION MAKING FREE TEXT BOX
68 y/o obese female, current smoker, w/ PMHx HLD, DM2, CAD s/p PCI 09/2018 , DERIK mid RCA and s/p DERIK pRCA, DERIK pLAD in 11/2020, IBS, GERD, and hx of nephrolithiasis presenting with chest burning.   Suspect GERD as patient states has run out of PPi  r/o ACS  Serial cardiac enzymes  EKG with no ischemic changes   Trial of PPi, Carafate  Disposition as per above eval

## 2025-03-23 NOTE — ED PROVIDER NOTE - NSFOLLOWUPINSTRUCTIONS_ED_ALL_ED_FT
1. Protonix daily at breakfast  2. Carafate three times per day with meals for two weeks  3. Avoid lying down within 2 hours of eating  4. Avoid common triggers- spice, wine, tomatoes, citrus  5. Follow up with your cardiologist within 1 week  6. Follow up with GI if your symptoms are not improving  7. Seek Medical attention or return to the emergency department for any new or worsening symptoms.

## 2025-03-23 NOTE — ED PROVIDER NOTE - PHYSICAL EXAMINATION
GENERAL: no acute distress; mildly anxious appearing.   HEAD:  Atraumatic, Normocephalic  EYES: EOMI, PERRLA, conjunctiva and sclera clear  ENT: MMM; oropharynx clear  NECK: Supple, No JVD  CHEST/LUNG: Clear to auscultation bilaterally; No wheeze  HEART: Regular rate and rhythm; No murmurs, rubs, or gallops  ABDOMEN: Soft, Nontender, Nondistended; Bowel sounds present  EXTREMITIES:  2+ Peripheral Pulses, No clubbing, cyanosis, or edema  PSYCH: AAOx3  NEUROLOGY: no focal motor or sensory deficits. 5/5 muscle strength in all extremities.   SKIN: No rashes or lesions

## 2025-03-23 NOTE — ED PROVIDER NOTE - OBJECTIVE STATEMENT
70 y/o obese female, current smoker, w/ PMHx HLD, DM2, CAD s/p PCI 09/2018 , DERIK mid RCA and s/p DERIK pRCA, DERIK pLAD in 11/2020, IBS, GERD, and hx of nephrolithiasis presenting with chest pain. 68 y/o obese female, current smoker, w/ PMHx HLD, DM2, CAD s/p PCI 09/2018 , DERIK mid RCA and s/p DERIK pRCA, DERIK pLAD in 11/2020, IBS, GERD, and hx of nephrolithiasis presenting with chest burning.   Notes intermittent burning for the last week- has run out of omeprazole without pressure or SOB. Patient states she also has tingling in her feet which she does not normally experience with DM2. Otherwise states compliant with medications. Last Cath 05/23 without intervention.

## 2025-03-26 DIAGNOSIS — Z87.442 PERSONAL HISTORY OF URINARY CALCULI: ICD-10-CM

## 2025-03-26 DIAGNOSIS — E11.9 TYPE 2 DIABETES MELLITUS WITHOUT COMPLICATIONS: ICD-10-CM

## 2025-03-26 DIAGNOSIS — T47.1X6A UNDERDOSING OF OTHER ANTACIDS AND ANTI-GASTRIC-SECRETION DRUGS, INITIAL ENCOUNTER: ICD-10-CM

## 2025-03-26 DIAGNOSIS — Z88.8 ALLERGY STATUS TO OTHER DRUGS, MEDICAMENTS AND BIOLOGICAL SUBSTANCES: ICD-10-CM

## 2025-03-26 DIAGNOSIS — E78.5 HYPERLIPIDEMIA, UNSPECIFIED: ICD-10-CM

## 2025-03-26 DIAGNOSIS — R07.9 CHEST PAIN, UNSPECIFIED: ICD-10-CM

## 2025-03-26 DIAGNOSIS — Z95.5 PRESENCE OF CORONARY ANGIOPLASTY IMPLANT AND GRAFT: ICD-10-CM

## 2025-03-26 DIAGNOSIS — I25.10 ATHEROSCLEROTIC HEART DISEASE OF NATIVE CORONARY ARTERY WITHOUT ANGINA PECTORIS: ICD-10-CM

## 2025-03-26 DIAGNOSIS — F17.200 NICOTINE DEPENDENCE, UNSPECIFIED, UNCOMPLICATED: ICD-10-CM

## 2025-03-26 DIAGNOSIS — K21.9 GASTRO-ESOPHAGEAL REFLUX DISEASE WITHOUT ESOPHAGITIS: ICD-10-CM

## 2025-03-26 DIAGNOSIS — K58.9 IRRITABLE BOWEL SYNDROME, UNSPECIFIED: ICD-10-CM

## 2025-05-08 NOTE — PATIENT PROFILE ADULT. - CHOOSE INDICATION TO IMMUNIZE (AN ORDER WILL BE GENERATED WHEN THIS NOTE IS SAVED):
05/08/2025  Juanita Noble is a 75 y.o., female.      Pre-op Assessment    I have reviewed the Patient Summary Reports.     I have reviewed the Nursing Notes. I have reviewed the NPO Status.   I have reviewed the Medications.     Review of Systems  Cardiovascular:     Hypertension                                          Endocrine:  Diabetes, type 2               Physical Exam  General: Well nourished, Cooperative, Alert and Oriented    Airway:  Mallampati: II   Mouth Opening: Normal  TM Distance: Normal  Tongue: Normal  Neck ROM: Normal ROM        Anesthesia Plan  Type of Anesthesia, risks & benefits discussed:    Anesthesia Type: Gen Natural Airway  Intra-op Monitoring Plan: Standard ASA Monitors  Post Op Pain Control Plan: multimodal analgesia  Induction:  IV  Airway Plan: , Awake  Informed Consent: Informed consent signed with the Patient and all parties understand the risks and agree with anesthesia plan.  All questions answered. Patient consented to blood products? Yes  ASA Score: 3  Day of Surgery Review of History & Physical: H&P Update referred to the surgeon/provider.    Ready For Surgery From Anesthesia Perspective.     .       Patient is 18 years or older (and not pregnant)